# Patient Record
Sex: MALE | Race: WHITE | NOT HISPANIC OR LATINO | Employment: OTHER | URBAN - METROPOLITAN AREA
[De-identification: names, ages, dates, MRNs, and addresses within clinical notes are randomized per-mention and may not be internally consistent; named-entity substitution may affect disease eponyms.]

---

## 2018-11-12 ENCOUNTER — OFFICE VISIT (OUTPATIENT)
Dept: CARDIOLOGY | Facility: CLINIC | Age: 73
End: 2018-11-12

## 2018-11-12 VITALS
HEIGHT: 77 IN | DIASTOLIC BLOOD PRESSURE: 86 MMHG | WEIGHT: 237 LBS | BODY MASS INDEX: 27.98 KG/M2 | SYSTOLIC BLOOD PRESSURE: 146 MMHG | HEART RATE: 63 BPM

## 2018-11-12 DIAGNOSIS — I42.9 CARDIOMYOPATHY, UNSPECIFIED TYPE (HCC): ICD-10-CM

## 2018-11-12 DIAGNOSIS — I65.23 BILATERAL CAROTID ARTERY STENOSIS: ICD-10-CM

## 2018-11-12 DIAGNOSIS — I48.0 PAROXYSMAL ATRIAL FIBRILLATION (HCC): Primary | ICD-10-CM

## 2018-11-12 PROCEDURE — 99205 OFFICE O/P NEW HI 60 MIN: CPT | Performed by: INTERNAL MEDICINE

## 2018-11-12 PROCEDURE — 93000 ELECTROCARDIOGRAM COMPLETE: CPT | Performed by: INTERNAL MEDICINE

## 2018-11-12 RX ORDER — ROSUVASTATIN CALCIUM 20 MG/1
TABLET, COATED ORAL
Refills: 3 | COMMUNITY
Start: 2018-09-24 | End: 2018-12-12

## 2018-11-12 RX ORDER — IPRATROPIUM BROMIDE 21 UG/1
SPRAY, METERED NASAL
Refills: 5 | COMMUNITY
Start: 2018-10-29 | End: 2018-12-12

## 2018-11-12 RX ORDER — TADALAFIL 5 MG
5 TABLET ORAL
COMMUNITY
Start: 2015-05-26 | End: 2018-12-12

## 2018-11-12 NOTE — PROGRESS NOTES
Subjective:     Encounter Date:11/12/2018      Patient ID: Lucas Maynard is a 73 y.o. male.    Chief Complaint: cardiomyopathy, carotid artery stenosis, PAF    History of Present Illness    Dear Dr. Aparicio:    I had the pleasure of seeing the patient in cardiac evaluation today.  As you well know, he is a johnson, 73-year-old man with history of carotid artery stenosis.  He follows with Dr. Acevedo for this and is considering carotid artery intervention, either with endarterectomy or with TCAR.  His most recent CT scan suggests 95% stenosis on the right and 70% to 80% on the left.      He has been asymptomatic without any neurologic complaints except for some dizziness.    He exercises three times weekly at the gym.  He can do 15-20 minutes of moderate cardiovascular workout without any angina.    Last year, he saw Dr. Castro for atrial fibrillation.  He was found to be in at least persistent atrial fibrillation.  He was started on rivaroxaban but quit this after a short time.  He then switched to apixaban, which he reports causes oral ulcers.  He is looking for an alternative treatment.      He states that he has been taking his apixaban only when he feels like it because he is worried about side effects.    He had an echocardiogram that suggested an ejection fraction of 45% with mild hypokinesis.  A nuclear perfusion stress test showed good perfusion except for a small area of possible prior infarct.    He comes in to discuss his cardiovascular risks of surgery as well as his general cardiac care.        Review of Systems   Cardiovascular: Negative for orthopnea.   Respiratory: Negative for shortness of breath.    All other systems reviewed and are negative.    History reviewed. No pertinent family history.    Social History     Tobacco Use   • Smoking status: Never Smoker   • Smokeless tobacco: Never Used   Substance Use Topics   • Alcohol use: Yes     Comment: SOCIALLY   • Drug use: No           ECG 12  Lead  Date/Time: 11/12/2018 9:54 AM  Performed by: Javier Powers MD  Authorized by: Javier Powers MD   Comparison: compared with previous ECG   Similar to previous ECG  Rhythm: atrial fibrillation  Ectopy: PVCs  BPM: 63                 Objective:     Physical Exam   Constitutional: He is oriented to person, place, and time. He appears well-developed and well-nourished.   HENT:   Head: Normocephalic and atraumatic.   Neck: Normal range of motion. Neck supple. No JVD present.   Cardiovascular: Normal rate and normal heart sounds. An irregularly irregular rhythm present. Exam reveals no S3 and no S4.   Pulmonary/Chest: Effort normal and breath sounds normal. He has no rales.   Abdominal: Soft. Bowel sounds are normal. He exhibits no ascites. There is no hepatomegaly.   Musculoskeletal: Normal range of motion.   Neurological: He is alert and oriented to person, place, and time.   Skin: Skin is warm and dry.   Psychiatric: He has a normal mood and affect. His behavior is normal. Thought content normal.   Vitals reviewed.      Lab Review:       Assessment:          Diagnosis Plan   1. Paroxysmal atrial fibrillation (CMS/HCC)     2. Bilateral carotid artery stenosis     3. Cardiomyopathy, unspecified type (CMS/HCC)            Plan:       It was a pleasure to see your patient in cardiac evaluation today.  He is a johnson, 73-year-old man with mild cardiomyopathy, which is likely due to atrial fibrillation rather than ischemic heart disease.  He has no symptoms of angina or heart failure.  He has no palpitations.  I have urged him to remain on anticoagulation in general to reduce his overall stroke risk.  He says that, after he gets through surgery, he might consider going back to rivaroxaban or maybe decreasing the dose of his apixaban.  He does not want to go on warfarin.      As far as his cardiovascular risk of surgery is concerned, I think he is at low risk of ischemic or heart failure complications of surgery.      He is  contemplated the TCAR procedure and I said that it is possible that, with bilateral carotid disease, he might qualify for this procedure on that basis.  He will discuss this further with Dr. Acevedo.    He will stop his anticoagulation three to five days prior to any surgical intervention.  He will see me again as needed.    I spent one hour in direct patient contact discussing these issues with the patient.    Atrial Fibrillation and Atrial Flutter  Assessment  • The patient has persistent atrial fibrillation  • This is non-valvular in etiology  • The patient's CHADS2-VASc score is 3  • A [id number] score of 2 or more is considered a high risk for a thromboembolic event  • Apixaban prescribed  • Aspirin prescribed    Plan  • Continue in atrial fibrillation with rate control  • Continue aspirin and apixaban for antithrombotic therapy, bleeding issues discussed  • Continue beta blocker for rate control    Heart Failure  Assessment  • NYHA class I - There is no limitation of physical activity. Physical activity does not cause fatigue, palpitations or shortness of breath.  • The most recent ejection fraction is 45%  • Left ventricular function is mildly reduced by qualitative assessment    Subjective/Objective    • Physical exam findings negative for rales, peripheral edema, elevated JVP, S3 gallop, S4 gallop, hepatomegaly and ascites.

## 2018-12-12 ENCOUNTER — HOSPITAL ENCOUNTER (OUTPATIENT)
Dept: GENERAL RADIOLOGY | Facility: HOSPITAL | Age: 73
Discharge: HOME | End: 2018-12-12
Admitting: SURGERY

## 2018-12-12 ENCOUNTER — APPOINTMENT (OUTPATIENT)
Dept: PREADMISSION TESTING | Facility: HOSPITAL | Age: 73
End: 2018-12-12

## 2018-12-12 VITALS
DIASTOLIC BLOOD PRESSURE: 86 MMHG | RESPIRATION RATE: 16 BRPM | BODY MASS INDEX: 31.62 KG/M2 | TEMPERATURE: 96.5 F | OXYGEN SATURATION: 99 % | HEIGHT: 73 IN | SYSTOLIC BLOOD PRESSURE: 155 MMHG | WEIGHT: 238.6 LBS | HEART RATE: 70 BPM

## 2018-12-12 LAB
ALBUMIN SERPL-MCNC: 4 G/DL (ref 3.5–5.2)
ALBUMIN/GLOB SERPL: 1.4 G/DL
ALP SERPL-CCNC: 67 U/L (ref 39–117)
ALT SERPL W P-5'-P-CCNC: 20 U/L (ref 1–41)
ANION GAP SERPL CALCULATED.3IONS-SCNC: 12.5 MMOL/L
APTT PPP: 25.7 SECONDS (ref 22.7–35.4)
AST SERPL-CCNC: 18 U/L (ref 1–40)
BACTERIA UR QL AUTO: NORMAL /HPF
BILIRUB SERPL-MCNC: 0.9 MG/DL (ref 0.1–1.2)
BILIRUB UR QL STRIP: NEGATIVE
BUN BLD-MCNC: 23 MG/DL (ref 8–23)
BUN/CREAT SERPL: 18 (ref 7–25)
CALCIUM SPEC-SCNC: 9.1 MG/DL (ref 8.6–10.5)
CHLORIDE SERPL-SCNC: 101 MMOL/L (ref 98–107)
CLARITY UR: CLEAR
CO2 SERPL-SCNC: 27.5 MMOL/L (ref 22–29)
COLOR UR: ABNORMAL
CREAT BLD-MCNC: 1.28 MG/DL (ref 0.76–1.27)
DEPRECATED RDW RBC AUTO: 43.9 FL (ref 37–54)
ERYTHROCYTE [DISTWIDTH] IN BLOOD BY AUTOMATED COUNT: 13.5 % (ref 11.5–14.5)
GFR SERPL CREATININE-BSD FRML MDRD: 55 ML/MIN/1.73
GLOBULIN UR ELPH-MCNC: 2.8 GM/DL
GLUCOSE BLD-MCNC: 114 MG/DL (ref 65–99)
GLUCOSE UR STRIP-MCNC: NEGATIVE MG/DL
HCT VFR BLD AUTO: 41.9 % (ref 40.4–52.2)
HGB BLD-MCNC: 14 G/DL (ref 13.7–17.6)
HGB UR QL STRIP.AUTO: NEGATIVE
HYALINE CASTS UR QL AUTO: NORMAL /LPF
INR PPP: 1 (ref 0.9–1.1)
KETONES UR QL STRIP: ABNORMAL
LEUKOCYTE ESTERASE UR QL STRIP.AUTO: NEGATIVE
MCH RBC QN AUTO: 30 PG (ref 27–32.7)
MCHC RBC AUTO-ENTMCNC: 33.4 G/DL (ref 32.6–36.4)
MCV RBC AUTO: 89.9 FL (ref 79.8–96.2)
NITRITE UR QL STRIP: NEGATIVE
PH UR STRIP.AUTO: <=5 [PH] (ref 5–8)
PLATELET # BLD AUTO: 215 10*3/MM3 (ref 140–500)
PMV BLD AUTO: 10.7 FL (ref 6–12)
POTASSIUM BLD-SCNC: 4.2 MMOL/L (ref 3.5–5.2)
PROT SERPL-MCNC: 6.8 G/DL (ref 6–8.5)
PROT UR QL STRIP: ABNORMAL
PROTHROMBIN TIME: 13 SECONDS (ref 11.7–14.2)
RBC # BLD AUTO: 4.66 10*6/MM3 (ref 4.6–6)
RBC # UR: NORMAL /HPF
REF LAB TEST METHOD: NORMAL
SODIUM BLD-SCNC: 141 MMOL/L (ref 136–145)
SP GR UR STRIP: 1.03 (ref 1–1.03)
SQUAMOUS #/AREA URNS HPF: NORMAL /HPF
UROBILINOGEN UR QL STRIP: ABNORMAL
WBC NRBC COR # BLD: 4.69 10*3/MM3 (ref 4.5–10.7)
WBC UR QL AUTO: NORMAL /HPF

## 2018-12-12 PROCEDURE — 85610 PROTHROMBIN TIME: CPT | Performed by: SURGERY

## 2018-12-12 PROCEDURE — 85730 THROMBOPLASTIN TIME PARTIAL: CPT | Performed by: SURGERY

## 2018-12-12 PROCEDURE — 80053 COMPREHEN METABOLIC PANEL: CPT | Performed by: SURGERY

## 2018-12-12 PROCEDURE — 36415 COLL VENOUS BLD VENIPUNCTURE: CPT

## 2018-12-12 PROCEDURE — 71046 X-RAY EXAM CHEST 2 VIEWS: CPT

## 2018-12-12 PROCEDURE — 81001 URINALYSIS AUTO W/SCOPE: CPT | Performed by: SURGERY

## 2018-12-12 PROCEDURE — 85027 COMPLETE CBC AUTOMATED: CPT | Performed by: SURGERY

## 2018-12-12 RX ORDER — ASPIRIN 81 MG/1
81 TABLET, CHEWABLE ORAL DAILY
COMMUNITY
End: 2019-06-26

## 2018-12-12 RX ORDER — IPRATROPIUM BROMIDE 21 UG/1
2 SPRAY, METERED NASAL EVERY 12 HOURS
COMMUNITY

## 2018-12-12 RX ORDER — SILDENAFIL CITRATE 20 MG/1
20 TABLET ORAL AS NEEDED
COMMUNITY
End: 2021-09-22 | Stop reason: HOSPADM

## 2018-12-12 RX ORDER — ROSUVASTATIN CALCIUM 20 MG/1
10 TABLET, COATED ORAL 3 TIMES WEEKLY
COMMUNITY
End: 2021-09-10

## 2018-12-12 RX ORDER — LEVOTHYROXINE SODIUM 0.05 MG/1
75 TABLET ORAL DAILY
COMMUNITY

## 2018-12-12 RX ORDER — METOPROLOL SUCCINATE 25 MG/1
25 TABLET, EXTENDED RELEASE ORAL EVERY MORNING
COMMUNITY
End: 2021-09-22 | Stop reason: HOSPADM

## 2018-12-12 NOTE — DISCHARGE INSTRUCTIONS
Take the following medications the morning of surgery with a small sip of water:    NASAL SPRAY, LEVOTHYROXINE, METOPROLOL, PROTONIX      ARRIVE TO MAIN OR 8:30 AM ON 12/20/18      General Instructions:  • Do not eat solid food after midnight the night before surgery.  • You may drink clear liquids day of surgery but must stop at least one hour before your hospital arrival time.  • It is beneficial for you to have a clear drink that contains carbohydrates the day of surgery.  We suggest a 12 to 20 ounce bottle of Gatorade or Powerade for non-diabetic patients or a 12 to 20 ounce bottle of G2 or Powerade Zero for diabetic patients. (Pediatric patients, are not advised to drink a 12 to 20 ounce carbohydrate drink)    Clear liquids are liquids you can see through.  Nothing red in color.     Plain water                               Sports drinks  Sodas                                   Gelatin (Jell-O)  Fruit juices without pulp such as white grape juice and apple juice  Popsicles that contain no fruit or yogurt  Tea or coffee (no cream or milk added)  Gatorade / Powerade  G2 / Powerade Zero    • Infants may have breast milk up to four hours before surgery.  • Infants drinking formula may drink formula up to six hours before surgery.   • Patients who avoid smoking, chewing tobacco and alcohol for 4 weeks prior to surgery have a reduced risk of post-operative complications.  Quit smoking as many days before surgery as you can.  • Do not smoke, use chewing tobacco or drink alcohol the day of surgery.   • If applicable bring your C-PAP/ BI-PAP machine.  • Bring any papers given to you in the doctor’s office.  • Wear clean comfortable clothes and socks.  • Do not wear contact lenses or make-up.  Bring a case for your glasses.   • Bring crutches or walker if applicable.  • Remove all piercings.  Leave jewelry and any other valuables at home.  • Hair extensions with metal clips must be removed prior to surgery.  • The  Pre-Admission Testing nurse will instruct you to bring medications if unable to obtain an accurate list in Pre-Admission Testing.            Preventing a Surgical Site Infection:  • For 2 to 3 days before surgery, avoid shaving with a razor because the razor can irritate skin and make it easier to develop an infection.    • Any areas of open skin can increase the risk of a post-operative wound infection by allowing bacteria to enter and travel throughout the body.  Notify your surgeon if you have any skin wounds / rashes even if it is not near the expected surgical site.  The area will need assessed to determine if surgery should be delayed until it is healed.  • The night prior to surgery sleep in a clean bed with clean clothing.  Do not allow pets to sleep with you.  • Shower on the morning of surgery using a fresh bar of anti-bacterial soap (such as Dial) and clean washcloth.  Dry with a clean towel and dress in clean clothing.  • Ask your surgeon if you will be receiving antibiotics prior to surgery.  • Make sure you, your family, and all healthcare providers clean their hands with soap and water or an alcohol based hand  before caring for you or your wound.    Day of surgery:  Upon arrival, a Pre-op nurse and Anesthesiologist will review your health history, obtain vital signs, and answer questions you may have.  The only belongings needed at this time will be your home medications and if applicable your C-PAP/BI-PAP machine.  If you are staying overnight your family can leave the rest of your belongings in the car and bring them to your room later.  A Pre-op nurse will start an IV and you may receive medication in preparation for surgery, including something to help you relax.  Your family will be able to see you in the Pre-op area.  While you are in surgery your family should notify the waiting room  if they leave the waiting room area and provide a contact phone number.    Please be  aware that surgery does come with discomfort.  We want to make every effort to control your discomfort so please discuss any uncontrolled symptoms with your nurse.   Your doctor will most likely have prescribed pain medications.      If you are going home after surgery you will receive individualized written care instructions before being discharged.  A responsible adult must drive you to and from the hospital on the day of your surgery and stay with you for 24 hours.    If you are staying overnight following surgery, you will be transported to your hospital room following the recovery period.  [facility] has all private rooms.    You have received a list of surgical assistants for your reference.  If you have any questions please call Pre-Admission Testing at [phone].  Deductibles and co-payments are collected on the day of service. Please be prepared to pay the required co-pay, deductible or deposit on the day of service as defined by your plan.

## 2018-12-20 ENCOUNTER — ANESTHESIA EVENT (OUTPATIENT)
Dept: PERIOP | Facility: HOSPITAL | Age: 73
End: 2018-12-20

## 2018-12-20 ENCOUNTER — HOSPITAL ENCOUNTER (INPATIENT)
Facility: HOSPITAL | Age: 73
LOS: 1 days | Discharge: HOME | End: 2018-12-21
Attending: SURGERY | Admitting: SURGERY

## 2018-12-20 ENCOUNTER — ANESTHESIA (OUTPATIENT)
Dept: PERIOP | Facility: HOSPITAL | Age: 73
End: 2018-12-20

## 2018-12-20 ENCOUNTER — APPOINTMENT (OUTPATIENT)
Dept: CARDIOLOGY | Facility: HOSPITAL | Age: 73
End: 2018-12-20
Attending: SURGERY

## 2018-12-20 PROBLEM — E03.9 ACQUIRED HYPOTHYROIDISM: Status: ACTIVE | Noted: 2018-06-22

## 2018-12-20 PROBLEM — I65.23 ASYMPTOMATIC BILATERAL CAROTID ARTERY STENOSIS: Status: ACTIVE | Noted: 2018-12-20

## 2018-12-20 LAB
BH CV XLRA MEAS RIGHT DIST CCA EDV: 18 CM/SEC
BH CV XLRA MEAS RIGHT DIST CCA PSV: 86 CM/SEC
BH CV XLRA MEAS RIGHT PROX ECA EDV: 6 CM/SEC
BH CV XLRA MEAS RIGHT PROX ECA PSV: 63 CM/SEC
BH CV XLRA MEAS RIGHT PROX ICA EDV: 25 CM/SEC
BH CV XLRA MEAS RIGHT PROX ICA PSV: 54 CM/SEC

## 2018-12-20 PROCEDURE — 25010000002 FENTANYL CITRATE (PF) 100 MCG/2ML SOLUTION: Performed by: ANESTHESIOLOGY

## 2018-12-20 PROCEDURE — 25010000003 CEFAZOLIN IN DEXTROSE 2-4 GM/100ML-% SOLUTION: Performed by: SURGERY

## 2018-12-20 PROCEDURE — 25010000002 PROTAMINE SULFATE PER 10 MG: Performed by: NURSE ANESTHETIST, CERTIFIED REGISTERED

## 2018-12-20 PROCEDURE — 93882 EXTRACRANIAL UNI/LTD STUDY: CPT

## 2018-12-20 PROCEDURE — 03UK0KZ SUPPLEMENT RIGHT INTERNAL CAROTID ARTERY WITH NONAUTOLOGOUS TISSUE SUBSTITUTE, OPEN APPROACH: ICD-10-PCS | Performed by: SURGERY

## 2018-12-20 PROCEDURE — 03CM0ZZ EXTIRPATION OF MATTER FROM RIGHT EXTERNAL CAROTID ARTERY, OPEN APPROACH: ICD-10-PCS | Performed by: SURGERY

## 2018-12-20 PROCEDURE — 03UM0KZ SUPPLEMENT RIGHT EXTERNAL CAROTID ARTERY WITH NONAUTOLOGOUS TISSUE SUBSTITUTE, OPEN APPROACH: ICD-10-PCS | Performed by: SURGERY

## 2018-12-20 PROCEDURE — 03UH0KZ SUPPLEMENT RIGHT COMMON CAROTID ARTERY WITH NONAUTOLOGOUS TISSUE SUBSTITUTE, OPEN APPROACH: ICD-10-PCS | Performed by: SURGERY

## 2018-12-20 PROCEDURE — 25010000002 PHENYLEPHRINE PER 1 ML: Performed by: NURSE ANESTHETIST, CERTIFIED REGISTERED

## 2018-12-20 PROCEDURE — 25010000002 ONDANSETRON PER 1 MG: Performed by: NURSE ANESTHETIST, CERTIFIED REGISTERED

## 2018-12-20 PROCEDURE — 25010000002 DEXAMETHASONE PER 1 MG: Performed by: NURSE ANESTHETIST, CERTIFIED REGISTERED

## 2018-12-20 PROCEDURE — 03CH0ZZ EXTIRPATION OF MATTER FROM RIGHT COMMON CAROTID ARTERY, OPEN APPROACH: ICD-10-PCS | Performed by: SURGERY

## 2018-12-20 PROCEDURE — 25010000003 CEFAZOLIN PER 500 MG: Performed by: SURGERY

## 2018-12-20 PROCEDURE — 25010000002 MIDAZOLAM PER 1 MG: Performed by: ANESTHESIOLOGY

## 2018-12-20 PROCEDURE — 03CK0ZZ EXTIRPATION OF MATTER FROM RIGHT INTERNAL CAROTID ARTERY, OPEN APPROACH: ICD-10-PCS | Performed by: SURGERY

## 2018-12-20 PROCEDURE — 25010000002 PROPOFOL 10 MG/ML EMULSION: Performed by: NURSE ANESTHETIST, CERTIFIED REGISTERED

## 2018-12-20 PROCEDURE — 25010000002 FENTANYL CITRATE (PF) 100 MCG/2ML SOLUTION: Performed by: NURSE ANESTHETIST, CERTIFIED REGISTERED

## 2018-12-20 PROCEDURE — 25010000002 HEPARIN (PORCINE) PER 1000 UNITS: Performed by: NURSE ANESTHETIST, CERTIFIED REGISTERED

## 2018-12-20 PROCEDURE — 85347 COAGULATION TIME ACTIVATED: CPT

## 2018-12-20 PROCEDURE — C1768 GRAFT, VASCULAR: HCPCS | Performed by: SURGERY

## 2018-12-20 PROCEDURE — 25010000002 HEPARIN (PORCINE) PER 1000 UNITS: Performed by: SURGERY

## 2018-12-20 DEVICE — VASCU-GUARD PERIPHERAL VASCULAR PATCH IS PREPARED FROM BOVINE PERICARDIUM WHICH IS CROSS-LINKED WITH GLUTARALDEHYDE. THE PERICARDIUM IS PROCURED FROM CATTLE ORIGINATING IN THE UNITED STATES. VASCU-GUARD PERIPHERAL VASCULAR PATCH IS CHEMICALLY STERILIZED USING ETHANOL AND PROPYLENE OXIDE. VASCU-GUARD PERIPHERAL VASCULAR PATCH HAS BEEN TREATED WITH 1 MOLAR SODIUM HYDROXIDE FOR A MINIMUM OF 60 MINUTES AT 20 - 25°C.  VASCU-GUARD PERIPHERAL VASCULAR PATCH IS PACKAGED IN A CONTAINER FILLED WITH STERILE, NON-PYROGENIC WATER CONTAINING PROPYLENE OXIDE. THE CONTENTS OF THE UNOPENED, UNDAMAGED CONTAINER ARE STERILE.
Type: IMPLANTABLE DEVICE | Site: CAROTID | Status: FUNCTIONAL
Brand: VASCU-GUARD PERIPHERAL VASCULAR PATCH

## 2018-12-20 RX ORDER — PROPOFOL 10 MG/ML
VIAL (ML) INTRAVENOUS AS NEEDED
Status: DISCONTINUED | OUTPATIENT
Start: 2018-12-20 | End: 2018-12-20 | Stop reason: SURG

## 2018-12-20 RX ORDER — LIDOCAINE HYDROCHLORIDE 10 MG/ML
INJECTION, SOLUTION EPIDURAL; INFILTRATION; INTRACAUDAL; PERINEURAL AS NEEDED
Status: DISCONTINUED | OUTPATIENT
Start: 2018-12-20 | End: 2018-12-20 | Stop reason: HOSPADM

## 2018-12-20 RX ORDER — FAMOTIDINE 10 MG/ML
20 INJECTION, SOLUTION INTRAVENOUS ONCE
Status: DISCONTINUED | OUTPATIENT
Start: 2018-12-20 | End: 2018-12-20 | Stop reason: HOSPADM

## 2018-12-20 RX ORDER — MIDAZOLAM HYDROCHLORIDE 1 MG/ML
INJECTION INTRAMUSCULAR; INTRAVENOUS
Status: COMPLETED | OUTPATIENT
Start: 2018-12-20 | End: 2018-12-20

## 2018-12-20 RX ORDER — SODIUM CHLORIDE 0.9 % (FLUSH) 0.9 %
1-10 SYRINGE (ML) INJECTION AS NEEDED
Status: DISCONTINUED | OUTPATIENT
Start: 2018-12-20 | End: 2018-12-20 | Stop reason: HOSPADM

## 2018-12-20 RX ORDER — HYDROMORPHONE HYDROCHLORIDE 1 MG/ML
0.5 INJECTION, SOLUTION INTRAMUSCULAR; INTRAVENOUS; SUBCUTANEOUS
Status: DISCONTINUED | OUTPATIENT
Start: 2018-12-20 | End: 2018-12-21 | Stop reason: HOSPADM

## 2018-12-20 RX ORDER — LIDOCAINE HYDROCHLORIDE 20 MG/ML
INJECTION, SOLUTION INFILTRATION; PERINEURAL AS NEEDED
Status: DISCONTINUED | OUTPATIENT
Start: 2018-12-20 | End: 2018-12-20 | Stop reason: SURG

## 2018-12-20 RX ORDER — ONDANSETRON 2 MG/ML
4 INJECTION INTRAMUSCULAR; INTRAVENOUS ONCE AS NEEDED
Status: COMPLETED | OUTPATIENT
Start: 2018-12-20 | End: 2018-12-20

## 2018-12-20 RX ORDER — HYDROMORPHONE HYDROCHLORIDE 1 MG/ML
0.25 INJECTION, SOLUTION INTRAMUSCULAR; INTRAVENOUS; SUBCUTANEOUS
Status: DISCONTINUED | OUTPATIENT
Start: 2018-12-20 | End: 2018-12-20 | Stop reason: HOSPADM

## 2018-12-20 RX ORDER — HYDROCODONE BITARTRATE AND ACETAMINOPHEN 7.5; 325 MG/1; MG/1
1 TABLET ORAL ONCE AS NEEDED
Status: DISCONTINUED | OUTPATIENT
Start: 2018-12-20 | End: 2018-12-20 | Stop reason: HOSPADM

## 2018-12-20 RX ORDER — SODIUM CHLORIDE, SODIUM LACTATE, POTASSIUM CHLORIDE, CALCIUM CHLORIDE 600; 310; 30; 20 MG/100ML; MG/100ML; MG/100ML; MG/100ML
50 INJECTION, SOLUTION INTRAVENOUS CONTINUOUS
Status: DISCONTINUED | OUTPATIENT
Start: 2018-12-20 | End: 2018-12-21

## 2018-12-20 RX ORDER — FLUMAZENIL 0.1 MG/ML
0.2 INJECTION INTRAVENOUS AS NEEDED
Status: DISCONTINUED | OUTPATIENT
Start: 2018-12-20 | End: 2018-12-20 | Stop reason: HOSPADM

## 2018-12-20 RX ORDER — PROMETHAZINE HYDROCHLORIDE 25 MG/1
25 SUPPOSITORY RECTAL ONCE AS NEEDED
Status: DISCONTINUED | OUTPATIENT
Start: 2018-12-20 | End: 2018-12-20 | Stop reason: HOSPADM

## 2018-12-20 RX ORDER — FENTANYL CITRATE 50 UG/ML
INJECTION, SOLUTION INTRAMUSCULAR; INTRAVENOUS
Status: COMPLETED | OUTPATIENT
Start: 2018-12-20 | End: 2018-12-20

## 2018-12-20 RX ORDER — PROMETHAZINE HYDROCHLORIDE 25 MG/ML
12.5 INJECTION, SOLUTION INTRAMUSCULAR; INTRAVENOUS ONCE AS NEEDED
Status: DISCONTINUED | OUTPATIENT
Start: 2018-12-20 | End: 2018-12-20 | Stop reason: HOSPADM

## 2018-12-20 RX ORDER — PROTAMINE SULFATE 10 MG/ML
INJECTION, SOLUTION INTRAVENOUS AS NEEDED
Status: DISCONTINUED | OUTPATIENT
Start: 2018-12-20 | End: 2018-12-20 | Stop reason: SURG

## 2018-12-20 RX ORDER — MEPERIDINE HYDROCHLORIDE 25 MG/ML
12.5 INJECTION INTRAMUSCULAR; INTRAVENOUS; SUBCUTANEOUS
Status: DISCONTINUED | OUTPATIENT
Start: 2018-12-20 | End: 2018-12-20 | Stop reason: HOSPADM

## 2018-12-20 RX ORDER — MIDAZOLAM HYDROCHLORIDE 1 MG/ML
1 INJECTION INTRAMUSCULAR; INTRAVENOUS
Status: DISCONTINUED | OUTPATIENT
Start: 2018-12-20 | End: 2018-12-20 | Stop reason: HOSPADM

## 2018-12-20 RX ORDER — FENTANYL CITRATE 50 UG/ML
50 INJECTION, SOLUTION INTRAMUSCULAR; INTRAVENOUS
Status: DISCONTINUED | OUTPATIENT
Start: 2018-12-20 | End: 2018-12-20 | Stop reason: HOSPADM

## 2018-12-20 RX ORDER — LIDOCAINE HYDROCHLORIDE 10 MG/ML
0.5 INJECTION, SOLUTION EPIDURAL; INFILTRATION; INTRACAUDAL; PERINEURAL ONCE AS NEEDED
Status: DISCONTINUED | OUTPATIENT
Start: 2018-12-20 | End: 2018-12-20 | Stop reason: HOSPADM

## 2018-12-20 RX ORDER — ONDANSETRON 2 MG/ML
INJECTION INTRAMUSCULAR; INTRAVENOUS AS NEEDED
Status: DISCONTINUED | OUTPATIENT
Start: 2018-12-20 | End: 2018-12-20 | Stop reason: SURG

## 2018-12-20 RX ORDER — HYDROCODONE BITARTRATE AND ACETAMINOPHEN 5; 325 MG/1; MG/1
1 TABLET ORAL EVERY 4 HOURS PRN
Status: DISCONTINUED | OUTPATIENT
Start: 2018-12-20 | End: 2018-12-21 | Stop reason: HOSPADM

## 2018-12-20 RX ORDER — LEVOTHYROXINE SODIUM 0.05 MG/1
50 TABLET ORAL
Status: DISCONTINUED | OUTPATIENT
Start: 2018-12-21 | End: 2018-12-21 | Stop reason: HOSPADM

## 2018-12-20 RX ORDER — SODIUM CHLORIDE, SODIUM LACTATE, POTASSIUM CHLORIDE, CALCIUM CHLORIDE 600; 310; 30; 20 MG/100ML; MG/100ML; MG/100ML; MG/100ML
9 INJECTION, SOLUTION INTRAVENOUS CONTINUOUS
Status: DISCONTINUED | OUTPATIENT
Start: 2018-12-20 | End: 2018-12-20

## 2018-12-20 RX ORDER — OXYCODONE AND ACETAMINOPHEN 7.5; 325 MG/1; MG/1
1 TABLET ORAL ONCE AS NEEDED
Status: DISCONTINUED | OUTPATIENT
Start: 2018-12-20 | End: 2018-12-20 | Stop reason: HOSPADM

## 2018-12-20 RX ORDER — CEFAZOLIN SODIUM 2 G/100ML
2 INJECTION, SOLUTION INTRAVENOUS EVERY 8 HOURS
Status: COMPLETED | OUTPATIENT
Start: 2018-12-20 | End: 2018-12-21

## 2018-12-20 RX ORDER — ROCURONIUM BROMIDE 10 MG/ML
INJECTION, SOLUTION INTRAVENOUS AS NEEDED
Status: DISCONTINUED | OUTPATIENT
Start: 2018-12-20 | End: 2018-12-20 | Stop reason: SURG

## 2018-12-20 RX ORDER — DEXAMETHASONE SODIUM PHOSPHATE 10 MG/ML
INJECTION INTRAMUSCULAR; INTRAVENOUS AS NEEDED
Status: DISCONTINUED | OUTPATIENT
Start: 2018-12-20 | End: 2018-12-20 | Stop reason: SURG

## 2018-12-20 RX ORDER — CEFAZOLIN SODIUM 2 G/100ML
2 INJECTION, SOLUTION INTRAVENOUS ONCE
Status: COMPLETED | OUTPATIENT
Start: 2018-12-20 | End: 2018-12-20

## 2018-12-20 RX ORDER — PROMETHAZINE HYDROCHLORIDE 25 MG/1
25 TABLET ORAL ONCE AS NEEDED
Status: DISCONTINUED | OUTPATIENT
Start: 2018-12-20 | End: 2018-12-20 | Stop reason: HOSPADM

## 2018-12-20 RX ORDER — DIPHENHYDRAMINE HCL 25 MG
25 CAPSULE ORAL
Status: DISCONTINUED | OUTPATIENT
Start: 2018-12-20 | End: 2018-12-20 | Stop reason: HOSPADM

## 2018-12-20 RX ORDER — NALOXONE HCL 0.4 MG/ML
0.2 VIAL (ML) INJECTION AS NEEDED
Status: DISCONTINUED | OUTPATIENT
Start: 2018-12-20 | End: 2018-12-20 | Stop reason: HOSPADM

## 2018-12-20 RX ORDER — ACETAMINOPHEN 325 MG/1
650 TABLET ORAL ONCE AS NEEDED
Status: DISCONTINUED | OUTPATIENT
Start: 2018-12-20 | End: 2018-12-20 | Stop reason: HOSPADM

## 2018-12-20 RX ORDER — NALOXONE HCL 0.4 MG/ML
0.4 VIAL (ML) INJECTION
Status: DISCONTINUED | OUTPATIENT
Start: 2018-12-20 | End: 2018-12-21 | Stop reason: HOSPADM

## 2018-12-20 RX ORDER — EPHEDRINE SULFATE 50 MG/ML
5 INJECTION, SOLUTION INTRAVENOUS ONCE AS NEEDED
Status: DISCONTINUED | OUTPATIENT
Start: 2018-12-20 | End: 2018-12-20 | Stop reason: HOSPADM

## 2018-12-20 RX ORDER — HEPARIN SODIUM 1000 [USP'U]/ML
INJECTION, SOLUTION INTRAVENOUS; SUBCUTANEOUS AS NEEDED
Status: DISCONTINUED | OUTPATIENT
Start: 2018-12-20 | End: 2018-12-20 | Stop reason: SURG

## 2018-12-20 RX ORDER — METOPROLOL SUCCINATE 25 MG/1
25 TABLET, EXTENDED RELEASE ORAL DAILY
Status: DISCONTINUED | OUTPATIENT
Start: 2018-12-21 | End: 2018-12-21 | Stop reason: HOSPADM

## 2018-12-20 RX ORDER — MIDAZOLAM HYDROCHLORIDE 1 MG/ML
2 INJECTION INTRAMUSCULAR; INTRAVENOUS
Status: DISCONTINUED | OUTPATIENT
Start: 2018-12-20 | End: 2018-12-20 | Stop reason: HOSPADM

## 2018-12-20 RX ORDER — ROSUVASTATIN CALCIUM 20 MG/1
20 TABLET, COATED ORAL 3 TIMES WEEKLY
Status: DISCONTINUED | OUTPATIENT
Start: 2018-12-21 | End: 2018-12-21 | Stop reason: HOSPADM

## 2018-12-20 RX ORDER — ASPIRIN 81 MG/1
81 TABLET, CHEWABLE ORAL DAILY
Status: DISCONTINUED | OUTPATIENT
Start: 2018-12-20 | End: 2018-12-21 | Stop reason: HOSPADM

## 2018-12-20 RX ADMIN — CEFAZOLIN SODIUM 2 G: 2 INJECTION, SOLUTION INTRAVENOUS at 18:17

## 2018-12-20 RX ADMIN — FENTANYL CITRATE 50 MCG: 50 INJECTION, SOLUTION INTRAMUSCULAR; INTRAVENOUS at 14:39

## 2018-12-20 RX ADMIN — FENTANYL CITRATE 50 MCG: 50 INJECTION, SOLUTION INTRAMUSCULAR; INTRAVENOUS at 14:27

## 2018-12-20 RX ADMIN — FENTANYL CITRATE 50 MCG: 50 INJECTION INTRAMUSCULAR; INTRAVENOUS at 09:52

## 2018-12-20 RX ADMIN — LIDOCAINE HYDROCHLORIDE 50 MG: 20 INJECTION, SOLUTION INFILTRATION; PERINEURAL at 11:37

## 2018-12-20 RX ADMIN — PHENYLEPHRINE HYDROCHLORIDE 100 MCG: 10 INJECTION INTRAVENOUS at 12:49

## 2018-12-20 RX ADMIN — HEPARIN SODIUM 7500 UNITS: 1000 INJECTION, SOLUTION INTRAVENOUS; SUBCUTANEOUS at 12:20

## 2018-12-20 RX ADMIN — PROPOFOL 150 MG: 10 INJECTION, EMULSION INTRAVENOUS at 11:37

## 2018-12-20 RX ADMIN — ROCURONIUM BROMIDE 50 MG: 10 INJECTION INTRAVENOUS at 11:37

## 2018-12-20 RX ADMIN — SUGAMMADEX 400 MG: 100 INJECTION, SOLUTION INTRAVENOUS at 13:26

## 2018-12-20 RX ADMIN — SODIUM CHLORIDE, POTASSIUM CHLORIDE, SODIUM LACTATE AND CALCIUM CHLORIDE: 600; 310; 30; 20 INJECTION, SOLUTION INTRAVENOUS at 13:16

## 2018-12-20 RX ADMIN — HEPARIN SODIUM 2500 UNITS: 1000 INJECTION, SOLUTION INTRAVENOUS; SUBCUTANEOUS at 12:32

## 2018-12-20 RX ADMIN — FENTANYL CITRATE 100 MCG: 50 INJECTION INTRAMUSCULAR; INTRAVENOUS at 11:36

## 2018-12-20 RX ADMIN — SODIUM CHLORIDE, POTASSIUM CHLORIDE, SODIUM LACTATE AND CALCIUM CHLORIDE 50 ML/HR: 600; 310; 30; 20 INJECTION, SOLUTION INTRAVENOUS at 15:48

## 2018-12-20 RX ADMIN — PROTAMINE SULFATE 40 MG: 10 INJECTION, SOLUTION INTRAVENOUS at 13:15

## 2018-12-20 RX ADMIN — DEXAMETHASONE SODIUM PHOSPHATE 6 MG: 10 INJECTION INTRAMUSCULAR; INTRAVENOUS at 12:23

## 2018-12-20 RX ADMIN — SODIUM CHLORIDE, POTASSIUM CHLORIDE, SODIUM LACTATE AND CALCIUM CHLORIDE: 600; 310; 30; 20 INJECTION, SOLUTION INTRAVENOUS at 11:26

## 2018-12-20 RX ADMIN — ONDANSETRON 4 MG: 2 INJECTION INTRAMUSCULAR; INTRAVENOUS at 13:15

## 2018-12-20 RX ADMIN — ROCURONIUM BROMIDE 10 MG: 10 INJECTION INTRAVENOUS at 13:00

## 2018-12-20 RX ADMIN — PHENYLEPHRINE HYDROCHLORIDE 100 MCG: 10 INJECTION INTRAVENOUS at 12:29

## 2018-12-20 RX ADMIN — CEFAZOLIN SODIUM 2 G: 2 INJECTION, SOLUTION INTRAVENOUS at 11:42

## 2018-12-20 RX ADMIN — Medication 81 MG: at 18:17

## 2018-12-20 RX ADMIN — PHENYLEPHRINE HYDROCHLORIDE 50 MCG: 10 INJECTION INTRAVENOUS at 12:40

## 2018-12-20 RX ADMIN — HEPARIN SODIUM 5000 UNITS: 1000 INJECTION, SOLUTION INTRAVENOUS; SUBCUTANEOUS at 12:46

## 2018-12-20 RX ADMIN — ONDANSETRON 4 MG: 2 INJECTION INTRAMUSCULAR; INTRAVENOUS at 14:53

## 2018-12-20 RX ADMIN — Medication 2 MG: at 09:51

## 2018-12-20 NOTE — ANESTHESIA PREPROCEDURE EVALUATION
Anesthesia Evaluation     Patient summary reviewed and Nursing notes reviewed   NPO Solid Status: > 8 hours  NPO Liquid Status: > 2 hours           Airway   Mallampati: II  TM distance: >3 FB  Neck ROM: full  no difficulty expected  Dental - normal exam     Pulmonary - negative pulmonary ROS and normal exam    breath sounds clear to auscultation  (-) decreased breath sounds, wheezes  Cardiovascular - normal exam  Exercise tolerance: good (4-7 METS)    Rhythm: regular  Rate: normal    (+) valvular problems/murmurs murmur, hyperlipidemia,  carotid artery disease right carotid  (-) hypertension      Neuro/Psych- negative ROS  (-) seizures, CVA  GI/Hepatic/Renal/Endo    (+)  GERD,  hypothyroidism,   (-) diabetes    Musculoskeletal     Abdominal  - normal exam   Substance History - negative use  (-) alcohol use, drug use     OB/GYN negative ob/gyn ROS         Other   (+) arthritis                     Anesthesia Plan    ASA 3     general     intravenous induction   Anesthetic plan, all risks, benefits, and alternatives have been provided, discussed and informed consent has been obtained with: patient.    Plan discussed with CRNA.

## 2018-12-20 NOTE — ANESTHESIA POSTPROCEDURE EVALUATION
Patient: Lucas Maynard    Procedure Summary     Date:  12/20/18 Room / Location:  [facility] OR 09 / [facility] MAIN OR    Anesthesia Start:  1126 Anesthesia Stop:  1358    Procedure:  RIGHT CAROTID ENDARTERECTOMY (Right Neck) Diagnosis:  Asymptomatic stenosis of right carotid artery    Surgeon:  Javier Acevedo MD Provider:  Jean Pierre Rao MD    Anesthesia Type:  general ASA Status:  3          Anesthesia Type: general  Last vitals  BP   139/92 (12/20/18 1420)   Temp   36.7 °C (98.1 °F) (12/20/18 1355)   Pulse   56 (12/20/18 1420)   Resp   14 (12/20/18 1420)     SpO2   97 % (12/20/18 1420)     Post Anesthesia Care and Evaluation    Patient location during evaluation: bedside  Patient participation: complete - patient participated  Level of consciousness: awake and alert  Pain management: adequate  Airway patency: patent  Anesthetic complications: No anesthetic complications  PONV Status: none  Cardiovascular status: acceptable  Respiratory status: acceptable  Hydration status: acceptable    Comments: /92   Pulse 56   Temp 36.7 °C (98.1 °F) (Oral)   Resp 14   SpO2 97%

## 2018-12-20 NOTE — OP NOTE
Lucas Maynard  Admission date: 12/20/2018  Date of operation: 12/20/2018    Pre-op Diagnosis:      * Asymptomatic stenosis of right carotid artery [I65.21]    Post-Op Diagnosis Codes:     * Asymptomatic stenosis of right carotid artery [I65.21]    Procedure performed: right carotid endarterectomy, vascuguard patch, duplex scan    Surgeon: Dr. Javier Acevedo    Assistants:  Kristie LUIS , provided critical assistance in exposure, retraction, and suctioning that overall decrease blood loss and operative time.    Anesthesia: General    Staff:   Circulator: Emily Murphy, CUAUHTEMOC; Lexi Knapp RN  Scrub Person: Ruth Bowman; Gabriela Oneill  Assistant: Kristie Nielsen    Indications:  Pleasant gentleman with asymptomatic right 95% and left 80% internal carotid artery stenoses.  Land right carotid endarterectomy today.  Planned staged left carotid endarterectomy in future once recovered.  Risks discussed with patient and agrees to proceed.       Procedure Details Right neck prepped and draped in usual fashion.  Linear incision made through skin subcutaneous tissue and platysma.  Bleeders controlled electrocautery and hemoclips.  External jugular vein divided and ligated with silk ligatures.  Greater auricular nerve identified and protected.  Sternocleidomastoid mobilized laterally.  Crossing vessels controlled with hemoclips electrocautery and silk ties.  Carotid sheath identified.  Facial vein divided and ligated with silk suture ligatures.  Additional veins controlled with hemoclips and silk ties.  The right common carotid artery, internal carotid artery, and external carotid artery circumferentially dissected from the omohyoid to digastric muscle.  Vagus nerves and hypoglossal nerves were both identified and protected throughout case.  Patient heparinized with 7,500 units of heparin.  Additional 5,000 units of heparin given during the case to maintain ACT twice normal.  Blood pressure  kept over 140 systolic during clamping.  Internal, common, and external carotid artery clamped sequentially.  Longitudinal arteriotomy made in the common carotid artery and extended into the internal carotid artery above level plaquing.  External Sundt shunt due to contralateral severe carotid stenosis. It was placed and secured proximally and distally with Bar clamps.  Endarterectomy performed of the common and internal carotid artery direct vision.  Eversion endarterectomy performed of the external carotid artery.  Adequate endpoints were achieved.  All loose plaque removed.  Endarterectomy site irrigated with heparin saline.  Vascu-Guard patch angioplasty closure of the endarterectomy site was then performed running 6-0 Prolene suture without problems.  Shunt removed and all air flushed prior to completion.  Intraoperative duplex scan was normal.  Heparin reversed with 40 mg of protamine.  Hemostasis achieved.  Wound irrigated with antibiotic solution.  Counts correct.  Wounds closed in layers with 3-0 Vicryl for the sternocleidomastoid, 4-0 Vicryl for the platysma, and subcuticular Vicryl stitch for the skin.  Dermabond applied.  Patient awoke neurologically intact.  Patient taken to recovery room in stable condition.      Radiographic interpretation: not applicable    Findings:  Severe 95% internal carotid artery stenosis with soft plaque    Estimated Blood Loss: 100ml    Specimens: * No orders in the log *      Drains:      Complications: none    Condition: stable    Disposition: to recovery room    Javier Acevedo MD     Date: 12/20/2018  Time: 1:55 PM    Active Hospital Problems    Diagnosis Date Noted   • Asymptomatic bilateral carotid artery stenosis [I65.23] 12/20/2018      Resolved Hospital Problems   No resolved problems to display.

## 2018-12-20 NOTE — ANESTHESIA PROCEDURE NOTES
ANESTHESIA INTUBATION  Urgency: elective    Date/Time: 12/20/2018 11:39 AM  Airway not difficult    General Information and Staff    Patient location during procedure: OR  Anesthesiologist: Jean Pierre Rao MD  CRNA: Halina Kirk CRNA    Indications and Patient Condition  Indications for airway management: airway protection    Preoxygenated: yes  Mask difficulty assessment: 1 - vent by mask    Final Airway Details  Final airway type: endotracheal airway      Successful airway: ETT  Cuffed: yes   Successful intubation technique: direct laryngoscopy  Endotracheal tube insertion site: oral  Blade: Jemma  Blade size: 4  ETT size (mm): 7.5  Cormack-Lehane Classification: grade I - full view of glottis  Placement verified by: chest auscultation and capnometry   Cuff volume (mL): 6  Measured from: lips  ETT to lips (cm): 21  Number of attempts at approach: 1    Additional Comments  Smooth IV induction. Trachea intubated. Cuff up. Dentition intact without injury.

## 2018-12-20 NOTE — ANESTHESIA PROCEDURE NOTES
Arterial Line      Patient location during procedure: holding area   Line placed for hemodynamic monitoring.  Performed By   Anesthesiologist: Jean Pierre Rao MD  Preanesthetic Checklist  Completed: patient identified, site marked, surgical consent, pre-op evaluation, timeout performed, IV checked, risks and benefits discussed and monitors and equipment checked  Arterial Line Prep   Sterile Tech: gloves  Prep: ChloraPrep  Patient monitoring: blood pressure monitoring, continuous pulse oximetry and EKG  Arterial Line Procedure   Laterality:left  Location:  radial artery  Catheter size: 20 G   Guidance: palpation technique  Number of attempts: 1  Successful placement: yes          Post Assessment   Dressing Type: occlusive dressing applied, secured with tape and wrist guard applied.   Complications no  Circ/Move/Sens Assessment: normal and unchanged.   Patient Tolerance: patient tolerated the procedure well with no apparent complications  Additional Notes  :

## 2018-12-20 NOTE — H&P
Patient Care Team:  Madison Aparicio MD as PCP - General  Madison Aparicio MD as PCP - Family Medicine  Madison Aparicio MD as PCP - Claims Attributed    Chief complaint bilateral severe carotid artery stenosis    Subjective     Patient is a 73 y.o. male presents with right 95% and left 80% internal carotid artery stenosis asymptomatic. This was diagnosed with carotid duplex scan and confirmed with CT angiogram at [facility]. Plan right carotid endarterectomy today and stage left carotid endarterectomy in future.     Patient with atrial fibrillation on eliquis. He has seen Dr Powers preoperatively and suitable candidate for surgery. He has history of prostate cancer treated. He has degenerative back disease stable. Hyperlipidemia and hypertension controlled medically.      Review of Systems   Pertinent items are noted in HPI, all other systems reviewed and negative    Past Medical History:   Diagnosis Date   • CAD (coronary artery disease)    • DDD (degenerative disc disease), lumbar    • Disease of thyroid gland    • GERD (gastroesophageal reflux disease)    • Hyperlipidemia    • Murmur, heart      Past Surgical History:   Procedure Laterality Date   • COLONOSCOPY N/A 7/27/2016    Procedure: COLONOSCOPY to terminal ileum with polypectomy;  Surgeon: Hi Quintanilla MD;  Location: [facility] ENDOSCOPY;  Service:    • CYST REMOVAL Right     ANKLE   • TONSILLECTOMY       Family History   Problem Relation Age of Onset   • Malig Hyperthermia Neg Hx      Social History     Tobacco Use   • Smoking status: Never Smoker   • Smokeless tobacco: Never Used   Substance Use Topics   • Alcohol use: Yes     Comment: SOCIALLY   • Drug use: No     Medications Prior to Admission   Medication Sig Dispense Refill Last Dose   • apixaban (ELIQUIS) 5 MG tablet tablet Take 5 mg by mouth Every 12 (Twelve) Hours.   11/12/2018   • aspirin 81 MG chewable tablet Chew 81 mg Daily.      • ipratropium (ATROVENT) 0.03 % nasal spray 2 sprays into the nostril(s)  as directed by provider Every 12 (Twelve) Hours.      • levothyroxine (SYNTHROID, LEVOTHROID) 50 MCG tablet Take 50 mcg by mouth Daily.      • metoprolol succinate XL (TOPROL-XL) 25 MG 24 hr tablet Take 25 mg by mouth Daily.      • Multiple Vitamin (MULTI-VITAMIN DAILY PO) Take 1 tablet by mouth Daily.      • pantoprazole (PROTONIX) 20 MG EC tablet Take 40 mg by mouth As Needed for Heartburn.   Taking   • Probiotic Product (PROBIOTIC ADVANCED PO) Take 2 capsules by mouth Daily.      • rosuvastatin (CRESTOR) 20 MG tablet Take 20 mg by mouth 3 (Three) Times a Week.      • sildenafil (REVATIO) 20 MG tablet Take 20 mg by mouth As Needed.        Allergies:  Patient has no known allergies.    Objective     Vital Signs            Physical Exam:      General Appearance:    Alert, cooperative, in no acute distress   Head:    Normocephalic, without obvious abnormality, atraumatic   Eyes:            Lids and lashes normal, conjunctivae and sclerae normal, no   icterus, no pallor, corneas clear, PERRLA   Ears:    Ears appear intact with no abnormalities noted   Throat:   No oral lesions, no thrush, oral mucosa moist   Neck:   No adenopathy, supple, trachea midline, no thyromegaly, no   carotid bruit, no JVD   Back:     No kyphosis present, no scoliosis present, no skin lesions,      erythema or scars, no tenderness to percussion or                   palpation,   range of motion normal   Lungs:     Clear to auscultation,respirations regular, even and                  unlabored    Heart:    Regular rhythm and normal rate, normal S1 and S2, no            murmur, no gallop, no rub, no click   Chest Wall:    No abnormalities observed   Abdomen:     Normal bowel sounds, no masses, no organomegaly, soft        non-tender, non-distended, no guarding, no rebound                tenderness   Rectal:     Deferred   Extremities:   Moves all extremities well, no edema, no cyanosis, no             redness   Pulses:   Pulses palpable and equal  bilaterally   Skin:   No bleeding, bruising or rash   Lymph nodes:   No palpable adenopathy   Neurologic:   Cranial nerves 2 - 12 grossly intact, sensation intact, DTR       present and equal bilaterally     Results Review:    I reviewed the patient's new clinical results.  I reviewed the patient's new imaging results and agree with the interpretation.  I reviewed the patient's other test results and agree with the interpretation  I personally viewed and interpreted the patient's EKG/Telemetry data    Assessment/Plan       Asymptomatic bilateral carotid artery stenosis      Right carotid endarterectomy    I discussed the patients findings and my recommendations with patient, family and nursing staff.     Javier Acevedo MD  12/20/18  9:33 AM    Time: 20 minutes

## 2018-12-20 NOTE — PLAN OF CARE
Problem: Patient Care Overview  Goal: Plan of Care Review  Outcome: Ongoing (interventions implemented as appropriate)   12/20/18 5965   Coping/Psychosocial   Plan of Care Reviewed With patient   Plan of Care Review   Progress improving   OTHER   Outcome Summary vss, pain controlled, incision CDI, tolerating clear liquids, continue to monitor.        Problem: Fall Risk (Adult)  Goal: Identify Related Risk Factors and Signs and Symptoms  Outcome: Ongoing (interventions implemented as appropriate)    Goal: Absence of Fall  Outcome: Ongoing (interventions implemented as appropriate)      Problem: Carotid Endarterectomy (Adult)  Goal: Signs and Symptoms of Listed Potential Problems Will be Absent, Minimized or Managed (Carotid Endarterectomy)  Outcome: Ongoing (interventions implemented as appropriate)    Goal: Anesthesia/Sedation Recovery  Outcome: Ongoing (interventions implemented as appropriate)

## 2018-12-21 VITALS
SYSTOLIC BLOOD PRESSURE: 165 MMHG | WEIGHT: 228 LBS | DIASTOLIC BLOOD PRESSURE: 103 MMHG | OXYGEN SATURATION: 97 % | RESPIRATION RATE: 16 BRPM | HEART RATE: 67 BPM | BODY MASS INDEX: 26.92 KG/M2 | TEMPERATURE: 98.1 F | HEIGHT: 77 IN

## 2018-12-21 LAB
ACT BLD: 125 SECONDS (ref 82–152)
ACT BLD: 136 SECONDS (ref 82–152)
ACT BLD: 202 SECONDS (ref 82–152)
ACT BLD: 213 SECONDS (ref 82–152)
ACT BLD: 241 SECONDS (ref 82–152)
ANION GAP SERPL CALCULATED.3IONS-SCNC: 13.4 MMOL/L
APTT PPP: 25.5 SECONDS (ref 22.7–35.4)
BASOPHILS # BLD AUTO: 0.02 10*3/MM3 (ref 0–0.2)
BASOPHILS NFR BLD AUTO: 0.3 % (ref 0–1.5)
BUN BLD-MCNC: 20 MG/DL (ref 8–23)
BUN/CREAT SERPL: 16.8 (ref 7–25)
CALCIUM SPEC-SCNC: 8.6 MG/DL (ref 8.6–10.5)
CHLORIDE SERPL-SCNC: 106 MMOL/L (ref 98–107)
CO2 SERPL-SCNC: 20.6 MMOL/L (ref 22–29)
CREAT BLD-MCNC: 1.19 MG/DL (ref 0.76–1.27)
DEPRECATED RDW RBC AUTO: 45.1 FL (ref 37–54)
EOSINOPHIL # BLD AUTO: 0 10*3/MM3 (ref 0–0.7)
EOSINOPHIL NFR BLD AUTO: 0 % (ref 0.3–6.2)
ERYTHROCYTE [DISTWIDTH] IN BLOOD BY AUTOMATED COUNT: 13.4 % (ref 11.5–14.5)
GFR SERPL CREATININE-BSD FRML MDRD: 60 ML/MIN/1.73
GLUCOSE BLD-MCNC: 162 MG/DL (ref 65–99)
HCT VFR BLD AUTO: 38.1 % (ref 40.4–52.2)
HGB BLD-MCNC: 12.4 G/DL (ref 13.7–17.6)
IMM GRANULOCYTES # BLD AUTO: 0 10*3/MM3 (ref 0–0.03)
IMM GRANULOCYTES NFR BLD AUTO: 0 % (ref 0–0.5)
INR PPP: 1.1 (ref 0.9–1.1)
LYMPHOCYTES # BLD AUTO: 1.2 10*3/MM3 (ref 0.9–4.8)
LYMPHOCYTES NFR BLD AUTO: 17.5 % (ref 19.6–45.3)
MCH RBC QN AUTO: 30.2 PG (ref 27–32.7)
MCHC RBC AUTO-ENTMCNC: 32.5 G/DL (ref 32.6–36.4)
MCV RBC AUTO: 92.7 FL (ref 79.8–96.2)
MONOCYTES # BLD AUTO: 0.4 10*3/MM3 (ref 0.2–1.2)
MONOCYTES NFR BLD AUTO: 5.8 % (ref 5–12)
NEUTROPHILS # BLD AUTO: 5.25 10*3/MM3 (ref 1.9–8.1)
NEUTROPHILS NFR BLD AUTO: 76.4 % (ref 42.7–76)
PLATELET # BLD AUTO: 185 10*3/MM3 (ref 140–500)
PMV BLD AUTO: 10.4 FL (ref 6–12)
POTASSIUM BLD-SCNC: 4.5 MMOL/L (ref 3.5–5.2)
PROTHROMBIN TIME: 14 SECONDS (ref 11.7–14.2)
RBC # BLD AUTO: 4.11 10*6/MM3 (ref 4.6–6)
SODIUM BLD-SCNC: 140 MMOL/L (ref 136–145)
WBC NRBC COR # BLD: 6.87 10*3/MM3 (ref 4.5–10.7)

## 2018-12-21 PROCEDURE — 85025 COMPLETE CBC W/AUTO DIFF WBC: CPT | Performed by: SURGERY

## 2018-12-21 PROCEDURE — 80048 BASIC METABOLIC PNL TOTAL CA: CPT | Performed by: SURGERY

## 2018-12-21 PROCEDURE — 85730 THROMBOPLASTIN TIME PARTIAL: CPT | Performed by: SURGERY

## 2018-12-21 PROCEDURE — 85610 PROTHROMBIN TIME: CPT | Performed by: SURGERY

## 2018-12-21 PROCEDURE — 25010000003 CEFAZOLIN IN DEXTROSE 2-4 GM/100ML-% SOLUTION: Performed by: SURGERY

## 2018-12-21 RX ORDER — HYDROCODONE BITARTRATE AND ACETAMINOPHEN 5; 325 MG/1; MG/1
1 TABLET ORAL EVERY 4 HOURS PRN
Qty: 10 TABLET | Refills: 0 | Status: SHIPPED | OUTPATIENT
Start: 2018-12-21 | End: 2018-12-30

## 2018-12-21 RX ADMIN — CEFAZOLIN SODIUM 2 G: 2 INJECTION, SOLUTION INTRAVENOUS at 03:37

## 2018-12-21 RX ADMIN — ROSUVASTATIN CALCIUM 20 MG: 20 TABLET, FILM COATED ORAL at 10:04

## 2018-12-21 RX ADMIN — METOPROLOL SUCCINATE 25 MG: 25 TABLET, FILM COATED, EXTENDED RELEASE ORAL at 09:58

## 2018-12-21 NOTE — PROGRESS NOTES
Javier Acevedo MD       LOS: 1 day   Patient Care Team:  Madison Aparicio MD as PCP - General  Madison Aparicio MD as PCP - Family Medicine  Madison Aparicio MD as PCP - Claims Attributed    Subjective     73 y.o. male doing well after right carotid endarterectomy.  No nausea or headache. Expected right pre-incisional numbness present.    Patient with atrial fibrillation on eliquis. He has seen Dr Powers preoperatively and suitable candidate for surgery. He has history of prostate cancer treated. He has degenerative back disease stable. Hyperlipidemia and hypertension controlled medically.          Review of Systems  Review of Systems - Negative except no cardiopulmonary symptoms      Objective     Vital Signs  Temp:  [97.6 °F (36.4 °C)-98.1 °F (36.7 °C)] 98 °F (36.7 °C)  Heart Rate:  [56-84] 77  Resp:  [14-16] 16  BP: (104-182)/() 143/90  Arterial Line BP: (1-157)/(1-121) 131/59    Physical Exam  General: No acute distress. Alert and oriented x 4  HEENT: No jugular venous distension, trachea is midline, right neck incision healing well with mild bruising but no hematoma  CV: RRR, S1S2  Resp: Clear unlabored breathing on oxygen  Abd: Abdomen is soft, nontender, nondistended  Extremities: Good strength in all extremities    Results Review:       Recent Results (from the past 12 hour(s))   Basic Metabolic Panel    Collection Time: 12/21/18  4:41 AM   Result Value Ref Range    Glucose 162 (H) 65 - 99 mg/dL    BUN 20 8 - 23 mg/dL    Creatinine 1.19 0.76 - 1.27 mg/dL    Sodium 140 136 - 145 mmol/L    Potassium 4.5 3.5 - 5.2 mmol/L    Chloride 106 98 - 107 mmol/L    CO2 20.6 (L) 22.0 - 29.0 mmol/L    Calcium 8.6 8.6 - 10.5 mg/dL    eGFR Non African Amer 60 (L) >60 mL/min/1.73    BUN/Creatinine Ratio 16.8 7.0 - 25.0    Anion Gap 13.4 mmol/L   aPTT    Collection Time: 12/21/18  4:41 AM   Result Value Ref Range    PTT 25.5 22.7 - 35.4 seconds   Protime-INR    Collection Time: 12/21/18  4:41 AM   Result Value Ref Range     Protime 14.0 11.7 - 14.2 Seconds    INR 1.10 0.90 - 1.10   CBC Auto Differential    Collection Time: 12/21/18  4:41 AM   Result Value Ref Range    WBC 6.87 4.50 - 10.70 10*3/mm3    RBC 4.11 (L) 4.60 - 6.00 10*6/mm3    Hemoglobin 12.4 (L) 13.7 - 17.6 g/dL    Hematocrit 38.1 (L) 40.4 - 52.2 %    MCV 92.7 79.8 - 96.2 fL    MCH 30.2 27.0 - 32.7 pg    MCHC 32.5 (L) 32.6 - 36.4 g/dL    RDW 13.4 11.5 - 14.5 %    RDW-SD 45.1 37.0 - 54.0 fl    MPV 10.4 6.0 - 12.0 fL    Platelets 185 140 - 500 10*3/mm3    Neutrophil % 76.4 (H) 42.7 - 76.0 %    Lymphocyte % 17.5 (L) 19.6 - 45.3 %    Monocyte % 5.8 5.0 - 12.0 %    Eosinophil % 0.0 (L) 0.3 - 6.2 %    Basophil % 0.3 0.0 - 1.5 %    Immature Grans % 0.0 0.0 - 0.5 %    Neutrophils, Absolute 5.25 1.90 - 8.10 10*3/mm3    Lymphocytes, Absolute 1.20 0.90 - 4.80 10*3/mm3    Monocytes, Absolute 0.40 0.20 - 1.20 10*3/mm3    Eosinophils, Absolute 0.00 0.00 - 0.70 10*3/mm3    Basophils, Absolute 0.02 0.00 - 0.20 10*3/mm3    Immature Grans, Absolute 0.00 0.00 - 0.03 10*3/mm3   ]      Assessment/Plan             Asymptomatic bilateral carotid artery stenosis      Assessment & Plan  73 y.o. male doing well after right carotid endarterectomy.  Home after breakfast and ambulate.  Discharge instructions and activity and follow-up all given to patient.  Discussed with nurse as well present in room.      Javier Acevedo MD  12/21/18  6:31 AM

## 2018-12-21 NOTE — DISCHARGE INSTRUCTIONS
[facility]  Sav Dwons, Vanessa Jackson Rachel, Scherrer, Thomas  [address]  [place] [address]  [phone]    Carotid Endartectomy Discharge Instructions:    Activity  · Do not lift anything heavier than 5 pounds (a gallon of milk); no bending, straining, or strenuous activity for the first 2 weeks.  · No driving for 7 days or while taking prescription pain medications. You may ride in a car. It is important that you have the ability to turn your head quickly without discomfort whileoperating a vehicle. This may not be possible for some people for 1-2 weeks after surgery.  · Walk as often as you wish. Walk short distances at first and increase slowly. Avoid exercising inextreme temperatures.  · You may have some slight dizziness, a mild headache or tiredness for a few days.  · You may go up and down stairs. Hold onto the rail for the first few days after surgery because you may experience dizziness.  · If you smoke, please quit. Smoking increases you chances of developing heart disease, carotid artery disease, lung cancer, and peripheral vascular disease. It can also delay wound healing.    Personal Hygiene/Shower  · ?You may shower the next day after your surgery.  · Use soap and water to gently clean the incision. Do not scrub the incision. Pat dry with a clean towel.  · Do not soak in a tub, whirlpool, or hot tub, or go swimming until the incision is completely healed. This is generally 3-4 weeks for most people.    Diet  · Resume the diet you were on before your surgery unless otherwise directed.  · For most people a low saturated fat and cholesterol diet which is high in fruits, vegetables and whole grains is a good healthy diet unless otherwise directed by your doctor.    Incision  · There will be an area of numbness along the incision in the neck and toward the chin. The earlobe may also be affected. This generally goes away and may last for 6-12 months.  · Your  neck incision is about is about 3-4 inches in length. The sutures under the skin will dissolve on their own. Take a close look at the incision in the mirror so that you will know what it looks like before leaving the hospital and will notice changes if they occur at home.  · For the first couple of days sleep on a couple of pillows to help with the swelling in the neck around the incision.  · You may apply an ice pack for the first 48 hours after surgery to the neck incision. It should be applied for 15 minutes, then off for 15 minutes. This may help with swelling and discomfort.  · Men may find it more difficult to shave with a blade and may switch to an electric razor. Do not shave over the incision; go around it until the incision is healed.  · Your incision will take several months to heal completely. It may feel raised and thickened for a while and will decrease over time. It takes 2-3 weeks for the swelling to go away. No ointments, lotions, creams or bandages should be applied to the incision.  · Most people do not have very much pain with this surgery. You can take over the counter Tylenol (Acetaminophen) for mild discomfort. Take it was directed on the packaging. You will be prescribed a pain medication for moderate discomfort, take it as directed. Do NOT take additional Tylenol with prescription pain medication. One of the side effects of pain medications is constipation and nausea. Most people will have nausea if it is taken on an empty stomach. Eat a small snack with pain medication to avoid this side effect. Drinking plenty of fluid and eating high fiber foods (fruits, vegetables and whole grains) can help prevent constipation. If needed you can take Docusate Sodium (Colace) 100 mg, a stool softener, once or twice a day. This can be purchased at any drug store. A laxative may be needed if the constipation continues. Generally, an over the counter laxative, such as Dulcolax tablets, is recommended (take  it as directed on the manufacturers packaging).  · Your updated medication list will be given to you before you leave the hospital. New prescriptions will be provided to you and education regarding new medications provided.    Call Your Surgeon for Any of These Symptoms @ [phone]    · Increasing pain or swelling in the neck causing difficulty breathing or swallowing.   · Sudden or severe headache. A headache that will not go away.  · Changes in your eyesight, problem speaking, or problem swallowing.  · Weakness on one side of your body.  · Increasing pain, not relieved by pain medication.  · Fever above 101 degrees.  · Redness, an increase in swelling or bruising around your incision. There may be some slight drainage the first couple of days from the incision, but this should stop and the incision should be dry. If there is continued drainage or pus the surgeon should be called. If you cannot reach your doctor, go to the nearest emergency room for immediate assessment.    Reducing Your Risks  · All patients with vascular disease should take important steps to prevent worsening of their condition or development of new disease. It is very important that if you smoke to quit. Good medical management of high cholesterol, high blood pressure, and diabetes, along with maintaining a normal body weight is encouraged to all of our patients. This is one of the reasons why routine follow-up with your primary care physician is very important to your continued health and well-being.    Follow-up appointment  · A follow-up appointment will be provided for you before you leave the hospital 1-4 weeks after your surgery. During this or the next visit you will undergo carotid artery duplex scanning. It is extremely important that you make this appointment because we need to evaluate the post-surgical carotid artery for normal blood flow.    Returning to Work  · This is generally discussed at the follow-up visit. If you have a  desk job, you may be able to return to work earlier than someone with a job requiring physical labor. If you want to return to work earlier than 4 weeks, this should be discussed with your surgeon prior to leaving the hospital.

## 2018-12-21 NOTE — PLAN OF CARE
Problem: Patient Care Overview  Goal: Plan of Care Review  Outcome: Ongoing (interventions implemented as appropriate)   12/21/18 0402   Coping/Psychosocial   Plan of Care Reviewed With patient   Plan of Care Review   Progress improving   OTHER   Outcome Summary VSS. No c/o pain. Neuro intact, incision c/d/i and sofe with small amout of bruising. Voiding good per urinal. Plan to dc in am. Will continue to monitor.      Goal: Individualization and Mutuality  Outcome: Ongoing (interventions implemented as appropriate)    Goal: Discharge Needs Assessment  Outcome: Ongoing (interventions implemented as appropriate)    Goal: Interprofessional Rounds/Family Conf  Outcome: Ongoing (interventions implemented as appropriate)      Problem: Fall Risk (Adult)  Goal: Identify Related Risk Factors and Signs and Symptoms  Outcome: Ongoing (interventions implemented as appropriate)    Goal: Absence of Fall  Outcome: Ongoing (interventions implemented as appropriate)      Problem: Carotid Endarterectomy (Adult)  Goal: Signs and Symptoms of Listed Potential Problems Will be Absent, Minimized or Managed (Carotid Endarterectomy)  Outcome: Ongoing (interventions implemented as appropriate)    Goal: Anesthesia/Sedation Recovery  Outcome: Ongoing (interventions implemented as appropriate)

## 2018-12-21 NOTE — DISCHARGE SUMMARY
"    Name: Lucas Maynard ADMIT: 2018   : 1945  PCP: Madison Aparicio MD    MRN: [id number] LOS: 1 days   AGE/SEX: 73 y.o. male  ROOM: 59/     Date of Admission: 2018    Date of Discharge:  2018    PCP: Madison Aparicio MD    DISCHARGE DIAGNOSIS  Active Hospital Problems    Diagnosis Date Noted   • Asymptomatic bilateral carotid artery stenosis [I65.23] 2018      Resolved Hospital Problems   No resolved problems to display.       SECONDARY DIAGNOSES  Past Medical History:   Diagnosis Date   • CAD (coronary artery disease)    • DDD (degenerative disc disease), lumbar    • Disease of thyroid gland    • GERD (gastroesophageal reflux disease)    • Hyperlipidemia    • Murmur, heart        CONSULTS   Consults     No orders found for last 30 day(s).          PROCEDURES PERFORMED    Procedure(s):  RIGHT CAROTID ENDARTERECTOMY       HOSPITAL COURSE    Patient is a 73 y.o. male presented to [facility] admitted for severe right 95% and left 80% carotid stenosis asymptomatic.  Please see the admitting history and physical for further details. Diagnosis on admission was Asymptomatic bilateral carotid artery stenosis [I65.23].  Patient underwent right carotid endarterectomy.  He was admitted postoperatively.  He had no problems.  He had advancement of diet and ambulation without problems.  Right neck incision healing well.  He had no nausea or headache.  He did have pre-incisional numbness as expected.  Patient was to be discharged after breakfast and ambulate.  I will follow with patient in 1 week.  Instructions and activity discussed with patient with nurse present.  Plan for staged left carotid endarterectomy in future for severe asymptomatic stenosis once recovered.      VITAL SIGNS  /90   Pulse 77   Temp 98 °F (36.7 °C) (Oral)   Resp 16   Ht 195.6 cm (77\")   Wt 103 kg (228 lb)   SpO2 94%   BMI 27.04 kg/m²    Flowsheet Rows      First Filed Value   Admission Height  " "195.6 cm (77\") Documented at 12/20/2018 1500   Admission Weight  103 kg (228 lb) Documented at 12/20/2018 1500          Physical Exam:     General Appearance:    Alert, cooperative, in no acute distress   Head:    Normocephalic, without obvious abnormality, atraumatic   Eyes:            Lids and lashes normal, conjunctivae and sclerae normal, no   icterus, no pallor, corneas clear, PERRLA   Ears:    Ears appear intact with no abnormalities noted   Throat:   No oral lesions, no thrush, oral mucosa moist   Neck:   No adenopathy, supple, trachea midline, no thyromegaly, no   carotid bruit, no JVD, right neck incision healing well    Back:     No kyphosis present, no scoliosis present, no skin lesions,      erythema or scars, no tenderness to percussion or                   palpation,   range of motion normal   Lungs:     Clear to auscultation,respirations regular, even and                  unlabored    Heart:    Regular rhythm and normal rate, normal S1 and S2, no            murmur, no gallop, no rub, no click   Chest Wall:    No abnormalities observed   Abdomen:     Normal bowel sounds, no masses, no organomegaly, soft        non-tender, non-distended, no guarding, no rebound                tenderness   Rectal:     Deferred   Extremities:   Moves all extremities well, no edema, no cyanosis, no             redness   Pulses:   Pulses palpable and equal bilaterally   Skin:   No bleeding, bruising or rash   Lymph nodes:   No palpable adenopathy   Neurologic:   Cranial nerves 2 - 12 grossly intact, sensation intact, DTR       present and equal bilaterally       Pertinent Test Results: Normal intraoperative duplex scan after right carotid endarterectomy    CONDITION ON DISCHARGE  Stable.      DISCHARGE DISPOSITION   Home or Self Care      DISCHARGE MEDICATIONS       Discharge Medications      New Medications      Instructions Start Date   HYDROcodone-acetaminophen 5-325 MG per tablet  Commonly known as:  NORCO   1 tablet, " Oral, Every 4 Hours PRN         Continue These Medications      Instructions Start Date   aspirin 81 MG chewable tablet   81 mg, Oral, Daily      ELIQUIS 5 MG tablet tablet  Generic drug:  apixaban   5 mg, Oral, Every 12 Hours Scheduled      ipratropium 0.03 % nasal spray  Commonly known as:  ATROVENT   2 sprays, Nasal, Every 12 Hours      levothyroxine 50 MCG tablet  Commonly known as:  SYNTHROID, LEVOTHROID   50 mcg, Oral, Daily      metoprolol succinate XL 25 MG 24 hr tablet  Commonly known as:  TOPROL-XL   25 mg, Oral, Daily      MULTI-VITAMIN DAILY PO   1 tablet, Oral, Daily      pantoprazole 20 MG EC tablet  Commonly known as:  PROTONIX   40 mg, Oral, As Needed      PROBIOTIC ADVANCED PO   2 capsules, Oral, Daily      rosuvastatin 20 MG tablet  Commonly known as:  CRESTOR   20 mg, Oral, 3 Times Weekly      sildenafil 20 MG tablet  Commonly known as:  REVATIO   20 mg, Oral, As Needed             TEST  RESULTS PENDING AT DISCHARGE        Discharge Diet:   Diet Instructions     Diet: Regular, Consistent Carbohydrate, Cardiac      Discharge Diet:   Regular  Consistent Carbohydrate  Cardiac             Activity at Discharge:   Activity Instructions     Activity as Tolerated            Follow-up Appointments    No future appointments.  Additional Instructions for the Follow-ups that You Need to Schedule     Discharge Follow-up with Specified Provider: Dr. Acevedo; 1 Week   As directed      To:  Dr. Acevedo    Follow Up:  1 Week             Additional Instructions for the Follow-ups that You Need to Schedule     Discharge Follow-up with Specified Provider: Dr. Acevedo; 1 Week   As directed      To:  Dr. Acevedo    Follow Up:  1 Week           Follow-up Information     Madison Aparicio MD .    Specialty:  Internal Medicine  Contact information:  [address] [place] [address]  [phone]                      [place]    Javier Acevedo MD  Office Number [phone]  [phone]    12/21/18  7:04 AM

## 2018-12-22 ENCOUNTER — READMISSION MANAGEMENT (OUTPATIENT)
Dept: CALL CENTER | Facility: HOSPITAL | Age: 73
End: 2018-12-22

## 2018-12-23 NOTE — OUTREACH NOTE
Prep Survey      Responses   Facility patient discharged from?  [facility]   Is patient eligible?  Yes   Discharge diagnosis  Right carotid Endarterectomy this visit   Does the patient have one of the following disease processes/diagnoses(primary or secondary)?  General Surgery   Does the patient have Home health ordered?  No   Is there a DME ordered?  No   Prep survey completed?  Yes          Kamryn Lomax RN

## 2018-12-26 ENCOUNTER — READMISSION MANAGEMENT (OUTPATIENT)
Dept: CALL CENTER | Facility: HOSPITAL | Age: 73
End: 2018-12-26

## 2018-12-26 NOTE — OUTREACH NOTE
General Surgery Week 1 Survey      Responses   Facility patient discharged from?  [facility]   Does the patient have one of the following disease processes/diagnoses(primary or secondary)?  General Surgery   Is there a successful TCM telephone encounter documented?  No   Week 1 attempt successful?  Yes   Call start time  1442   Call end time  1444   Discharge diagnosis  Right carotid Endarterectomy   Is patient permission given to speak with other caregiver?  No   Meds reviewed with patient/caregiver?  Yes   Is the patient having any side effects they believe may be caused by any medication additions or changes?  No   Does the patient have all medications related to this admission filled (includes all antibiotics, pain medications, etc.)  Yes   Is the patient taking all medications as directed (includes completed medication regime)?  Yes   Does the patient have a follow up appointment scheduled with their surgeon?  Yes   Has the patient kept scheduled appointments due by today?  N/A   Comments  12/27/2018  surgeon   Has home health visited the patient within 72 hours of discharge?  N/A   Psychosocial issues?  No   Did the patient receive a copy of their discharge instructions?  Yes   Nursing interventions  Reviewed instructions with patient   What is the patient's perception of their health status since discharge?  Improving   Nursing interventions  Nurse provided patient education   Is the patient /caregiver able to teach back basic post-op care?  Keep incision areas clean,dry and protected, Take showers only when approved by MD-sponge bathe until then, No tub bath, swimming, or hot tub until instructed by MD, Drive as instructed by MD in discharge instructions, Lifting as instructed by MD in discharge instructions   Is the patient/caregiver able to teach back signs and symptoms of incisional infection?  Increased redness, swelling or pain at the incisonal site, Increased drainage or bleeding, Incisional warmth,  Pus or odor from incision, Fever   Is the patient/caregiver able to teach back steps to recovery at home?  Set small, achievable goals for return to baseline health, Rest and rebuild strength, gradually increase activity   Is the patient/caregiver able to teach back the hierarchy of who to call/visit for symptoms/problems? PCP, Specialist, Home health nurse, Urgent Care, ED, 911  Yes   Week 1 call completed?  Yes          Siomara Brewer RN

## 2019-06-26 ENCOUNTER — APPOINTMENT (OUTPATIENT)
Dept: PREADMISSION TESTING | Facility: HOSPITAL | Age: 74
End: 2019-06-26

## 2019-06-26 ENCOUNTER — HOSPITAL ENCOUNTER (OUTPATIENT)
Dept: GENERAL RADIOLOGY | Facility: HOSPITAL | Age: 74
Discharge: HOME | End: 2019-06-26
Admitting: SURGERY

## 2019-06-26 VITALS
HEART RATE: 68 BPM | HEIGHT: 77 IN | RESPIRATION RATE: 20 BRPM | SYSTOLIC BLOOD PRESSURE: 133 MMHG | WEIGHT: 236 LBS | TEMPERATURE: 97.4 F | DIASTOLIC BLOOD PRESSURE: 75 MMHG | BODY MASS INDEX: 27.87 KG/M2 | OXYGEN SATURATION: 98 %

## 2019-06-26 LAB
ALBUMIN SERPL-MCNC: 4.3 G/DL (ref 3.5–5.2)
ALBUMIN/GLOB SERPL: 1.5 G/DL
ALP SERPL-CCNC: 65 U/L (ref 39–117)
ALT SERPL W P-5'-P-CCNC: 19 U/L (ref 1–41)
ANION GAP SERPL CALCULATED.3IONS-SCNC: 11 MMOL/L (ref 5–15)
APTT PPP: 25.8 SECONDS (ref 22.7–35.4)
AST SERPL-CCNC: 18 U/L (ref 1–40)
BACTERIA UR QL AUTO: NORMAL /HPF
BILIRUB SERPL-MCNC: 1 MG/DL (ref 0.2–1.2)
BILIRUB UR QL STRIP: NEGATIVE
BUN BLD-MCNC: 24 MG/DL (ref 8–23)
BUN/CREAT SERPL: 17.4 (ref 7–25)
CALCIUM SPEC-SCNC: 9.5 MG/DL (ref 8.6–10.5)
CHLORIDE SERPL-SCNC: 103 MMOL/L (ref 98–107)
CLARITY UR: CLEAR
CO2 SERPL-SCNC: 26 MMOL/L (ref 22–29)
COLOR UR: ABNORMAL
CREAT BLD-MCNC: 1.38 MG/DL (ref 0.76–1.27)
DEPRECATED RDW RBC AUTO: 46.3 FL (ref 37–54)
ERYTHROCYTE [DISTWIDTH] IN BLOOD BY AUTOMATED COUNT: 13.6 % (ref 12.3–15.4)
GFR SERPL CREATININE-BSD FRML MDRD: 50 ML/MIN/1.73
GLOBULIN UR ELPH-MCNC: 2.9 GM/DL
GLUCOSE BLD-MCNC: 87 MG/DL (ref 65–99)
GLUCOSE UR STRIP-MCNC: NEGATIVE MG/DL
HCT VFR BLD AUTO: 43.9 % (ref 37.5–51)
HGB BLD-MCNC: 14.6 G/DL (ref 13–17.7)
HGB UR QL STRIP.AUTO: NEGATIVE
HYALINE CASTS UR QL AUTO: NORMAL /LPF
INR PPP: 0.97 (ref 0.9–1.1)
KETONES UR QL STRIP: ABNORMAL
LEUKOCYTE ESTERASE UR QL STRIP.AUTO: NEGATIVE
MCH RBC QN AUTO: 31.1 PG (ref 26.6–33)
MCHC RBC AUTO-ENTMCNC: 33.3 G/DL (ref 31.5–35.7)
MCV RBC AUTO: 93.6 FL (ref 79–97)
NITRITE UR QL STRIP: NEGATIVE
PH UR STRIP.AUTO: <=5 [PH] (ref 5–8)
PLATELET # BLD AUTO: 221 10*3/MM3 (ref 140–450)
PMV BLD AUTO: 10.2 FL (ref 6–12)
POTASSIUM BLD-SCNC: 4.5 MMOL/L (ref 3.5–5.2)
PROT SERPL-MCNC: 7.2 G/DL (ref 6–8.5)
PROT UR QL STRIP: ABNORMAL
PROTHROMBIN TIME: 12.6 SECONDS (ref 11.7–14.2)
RBC # BLD AUTO: 4.69 10*6/MM3 (ref 4.14–5.8)
RBC # UR: NORMAL /HPF
REF LAB TEST METHOD: NORMAL
SODIUM BLD-SCNC: 140 MMOL/L (ref 136–145)
SP GR UR STRIP: 1.03 (ref 1–1.03)
SQUAMOUS #/AREA URNS HPF: NORMAL /HPF
UROBILINOGEN UR QL STRIP: ABNORMAL
WBC NRBC COR # BLD: 4.68 10*3/MM3 (ref 3.4–10.8)
WBC UR QL AUTO: NORMAL /HPF

## 2019-06-26 PROCEDURE — 85730 THROMBOPLASTIN TIME PARTIAL: CPT | Performed by: SURGERY

## 2019-06-26 PROCEDURE — 93005 ELECTROCARDIOGRAM TRACING: CPT

## 2019-06-26 PROCEDURE — 80053 COMPREHEN METABOLIC PANEL: CPT | Performed by: SURGERY

## 2019-06-26 PROCEDURE — 85610 PROTHROMBIN TIME: CPT | Performed by: SURGERY

## 2019-06-26 PROCEDURE — 93010 ELECTROCARDIOGRAM REPORT: CPT | Performed by: INTERNAL MEDICINE

## 2019-06-26 PROCEDURE — 85027 COMPLETE CBC AUTOMATED: CPT | Performed by: SURGERY

## 2019-06-26 PROCEDURE — 71046 X-RAY EXAM CHEST 2 VIEWS: CPT

## 2019-06-26 PROCEDURE — 36415 COLL VENOUS BLD VENIPUNCTURE: CPT

## 2019-06-26 PROCEDURE — 81001 URINALYSIS AUTO W/SCOPE: CPT | Performed by: SURGERY

## 2019-06-26 RX ORDER — ASPIRIN 81 MG/1
81 TABLET ORAL DAILY
COMMUNITY

## 2019-06-26 RX ORDER — PANTOPRAZOLE SODIUM 40 MG/1
40 TABLET, DELAYED RELEASE ORAL NIGHTLY
COMMUNITY

## 2019-07-03 ENCOUNTER — APPOINTMENT (OUTPATIENT)
Dept: GENERAL RADIOLOGY | Facility: HOSPITAL | Age: 74
End: 2019-07-03

## 2019-07-03 ENCOUNTER — APPOINTMENT (OUTPATIENT)
Dept: CARDIOLOGY | Facility: HOSPITAL | Age: 74
End: 2019-07-03

## 2019-07-03 ENCOUNTER — ANESTHESIA (OUTPATIENT)
Dept: PERIOP | Facility: HOSPITAL | Age: 74
End: 2019-07-03

## 2019-07-03 ENCOUNTER — HOSPITAL ENCOUNTER (INPATIENT)
Facility: HOSPITAL | Age: 74
LOS: 1 days | Discharge: HOME | End: 2019-07-04
Attending: SURGERY | Admitting: SURGERY

## 2019-07-03 ENCOUNTER — ANESTHESIA EVENT (OUTPATIENT)
Dept: PERIOP | Facility: HOSPITAL | Age: 74
End: 2019-07-03

## 2019-07-03 PROBLEM — I65.22 CAROTID STENOSIS, ASYMPTOMATIC, LEFT: Status: ACTIVE | Noted: 2019-07-03

## 2019-07-03 LAB
BH CV XLRA MEAS LEFT DIST CCA EDV: 14.3 CM/SEC
BH CV XLRA MEAS LEFT DIST CCA PSV: 74.1 CM/SEC
BH CV XLRA MEAS LEFT DIST ICA EDV: 14.9 CM/SEC
BH CV XLRA MEAS LEFT DIST ICA PSV: 41.8 CM/SEC
BH CV XLRA MEAS LEFT PROX ECA EDV: 4.21 CM/SEC
BH CV XLRA MEAS LEFT PROX ECA PSV: 61 CM/SEC
BH CV XLRA MEAS LEFT PROX ICA EDV: 16.2 CM/SEC
BH CV XLRA MEAS LEFT PROX ICA PSV: 42.1 CM/SEC

## 2019-07-03 PROCEDURE — 25010000003 CEFAZOLIN IN DEXTROSE 2-4 GM/100ML-% SOLUTION: Performed by: SURGERY

## 2019-07-03 PROCEDURE — 25010000002 HEPARIN (PORCINE) PER 1000 UNITS: Performed by: NURSE ANESTHETIST, CERTIFIED REGISTERED

## 2019-07-03 PROCEDURE — 25010000002 HYDROMORPHONE PER 4 MG: Performed by: NURSE ANESTHETIST, CERTIFIED REGISTERED

## 2019-07-03 PROCEDURE — 25010000002 MIDAZOLAM PER 1 MG: Performed by: ANESTHESIOLOGY

## 2019-07-03 PROCEDURE — 25010000003 CEFAZOLIN PER 500 MG: Performed by: SURGERY

## 2019-07-03 PROCEDURE — 71045 X-RAY EXAM CHEST 1 VIEW: CPT

## 2019-07-03 PROCEDURE — 03CL0ZZ EXTIRPATION OF MATTER FROM LEFT INTERNAL CAROTID ARTERY, OPEN APPROACH: ICD-10-PCS | Performed by: SURGERY

## 2019-07-03 PROCEDURE — 25010000002 PHENYLEPHRINE PER 1 ML: Performed by: NURSE ANESTHETIST, CERTIFIED REGISTERED

## 2019-07-03 PROCEDURE — 25010000002 HEPARIN (PORCINE) PER 1000 UNITS: Performed by: SURGERY

## 2019-07-03 PROCEDURE — 85347 COAGULATION TIME ACTIVATED: CPT

## 2019-07-03 PROCEDURE — 25010000002 FENTANYL CITRATE (PF) 100 MCG/2ML SOLUTION: Performed by: NURSE ANESTHETIST, CERTIFIED REGISTERED

## 2019-07-03 PROCEDURE — 25010000002 FENTANYL CITRATE (PF) 100 MCG/2ML SOLUTION: Performed by: ANESTHESIOLOGY

## 2019-07-03 PROCEDURE — 25010000002 DEXAMETHASONE PER 1 MG: Performed by: NURSE ANESTHETIST, CERTIFIED REGISTERED

## 2019-07-03 PROCEDURE — 25010000002 PROPOFOL 10 MG/ML EMULSION: Performed by: NURSE ANESTHETIST, CERTIFIED REGISTERED

## 2019-07-03 PROCEDURE — C1768 GRAFT, VASCULAR: HCPCS | Performed by: SURGERY

## 2019-07-03 PROCEDURE — 25010000002 HYDRALAZINE PER 20 MG: Performed by: NURSE ANESTHETIST, CERTIFIED REGISTERED

## 2019-07-03 PROCEDURE — 25010000002 ONDANSETRON PER 1 MG: Performed by: NURSE ANESTHETIST, CERTIFIED REGISTERED

## 2019-07-03 PROCEDURE — 25010000002 PROTAMINE SULFATE PER 10 MG: Performed by: NURSE ANESTHETIST, CERTIFIED REGISTERED

## 2019-07-03 PROCEDURE — 03UL0KZ SUPPLEMENT LEFT INTERNAL CAROTID ARTERY WITH NONAUTOLOGOUS TISSUE SUBSTITUTE, OPEN APPROACH: ICD-10-PCS | Performed by: SURGERY

## 2019-07-03 PROCEDURE — 93882 EXTRACRANIAL UNI/LTD STUDY: CPT

## 2019-07-03 RX ORDER — DIPHENHYDRAMINE HCL 25 MG
25 CAPSULE ORAL
Status: DISCONTINUED | OUTPATIENT
Start: 2019-07-03 | End: 2019-07-03 | Stop reason: HOSPADM

## 2019-07-03 RX ORDER — SODIUM CHLORIDE 0.9 % (FLUSH) 0.9 %
1-10 SYRINGE (ML) INJECTION AS NEEDED
Status: DISCONTINUED | OUTPATIENT
Start: 2019-07-03 | End: 2019-07-03 | Stop reason: HOSPADM

## 2019-07-03 RX ORDER — FENTANYL CITRATE 50 UG/ML
50 INJECTION, SOLUTION INTRAMUSCULAR; INTRAVENOUS
Status: DISCONTINUED | OUTPATIENT
Start: 2019-07-03 | End: 2019-07-03 | Stop reason: HOSPADM

## 2019-07-03 RX ORDER — ASPIRIN 81 MG/1
81 TABLET ORAL DAILY
Status: DISCONTINUED | OUTPATIENT
Start: 2019-07-04 | End: 2019-07-04 | Stop reason: HOSPADM

## 2019-07-03 RX ORDER — PROMETHAZINE HYDROCHLORIDE 25 MG/ML
12.5 INJECTION, SOLUTION INTRAMUSCULAR; INTRAVENOUS ONCE AS NEEDED
Status: DISCONTINUED | OUTPATIENT
Start: 2019-07-03 | End: 2019-07-03 | Stop reason: HOSPADM

## 2019-07-03 RX ORDER — MIDAZOLAM HYDROCHLORIDE 1 MG/ML
2 INJECTION INTRAMUSCULAR; INTRAVENOUS
Status: DISCONTINUED | OUTPATIENT
Start: 2019-07-03 | End: 2019-07-03 | Stop reason: HOSPADM

## 2019-07-03 RX ORDER — ONDANSETRON 2 MG/ML
INJECTION INTRAMUSCULAR; INTRAVENOUS AS NEEDED
Status: DISCONTINUED | OUTPATIENT
Start: 2019-07-03 | End: 2019-07-03 | Stop reason: SURG

## 2019-07-03 RX ORDER — HYDROCODONE BITARTRATE AND ACETAMINOPHEN 7.5; 325 MG/1; MG/1
1 TABLET ORAL ONCE AS NEEDED
Status: DISCONTINUED | OUTPATIENT
Start: 2019-07-03 | End: 2019-07-03 | Stop reason: HOSPADM

## 2019-07-03 RX ORDER — PANTOPRAZOLE SODIUM 40 MG/1
40 TABLET, DELAYED RELEASE ORAL NIGHTLY
Status: DISCONTINUED | OUTPATIENT
Start: 2019-07-03 | End: 2019-07-04 | Stop reason: HOSPADM

## 2019-07-03 RX ORDER — FAMOTIDINE 10 MG/ML
20 INJECTION, SOLUTION INTRAVENOUS ONCE
Status: COMPLETED | OUTPATIENT
Start: 2019-07-03 | End: 2019-07-03

## 2019-07-03 RX ORDER — SODIUM CHLORIDE, SODIUM LACTATE, POTASSIUM CHLORIDE, CALCIUM CHLORIDE 600; 310; 30; 20 MG/100ML; MG/100ML; MG/100ML; MG/100ML
50 INJECTION, SOLUTION INTRAVENOUS CONTINUOUS
Status: DISCONTINUED | OUTPATIENT
Start: 2019-07-03 | End: 2019-07-04

## 2019-07-03 RX ORDER — NITROGLYCERIN 5 MG/ML
INJECTION, SOLUTION INTRAVENOUS AS NEEDED
Status: DISCONTINUED | OUTPATIENT
Start: 2019-07-03 | End: 2019-07-03 | Stop reason: SURG

## 2019-07-03 RX ORDER — OXYCODONE AND ACETAMINOPHEN 7.5; 325 MG/1; MG/1
1 TABLET ORAL ONCE AS NEEDED
Status: DISCONTINUED | OUTPATIENT
Start: 2019-07-03 | End: 2019-07-03 | Stop reason: HOSPADM

## 2019-07-03 RX ORDER — FENTANYL CITRATE 50 UG/ML
INJECTION, SOLUTION INTRAMUSCULAR; INTRAVENOUS AS NEEDED
Status: DISCONTINUED | OUTPATIENT
Start: 2019-07-03 | End: 2019-07-03 | Stop reason: SURG

## 2019-07-03 RX ORDER — NALOXONE HCL 0.4 MG/ML
0.4 VIAL (ML) INJECTION
Status: DISCONTINUED | OUTPATIENT
Start: 2019-07-03 | End: 2019-07-04 | Stop reason: HOSPADM

## 2019-07-03 RX ORDER — HYDRALAZINE HYDROCHLORIDE 20 MG/ML
5 INJECTION INTRAMUSCULAR; INTRAVENOUS
Status: DISCONTINUED | OUTPATIENT
Start: 2019-07-03 | End: 2019-07-03 | Stop reason: HOSPADM

## 2019-07-03 RX ORDER — ROCURONIUM BROMIDE 10 MG/ML
INJECTION, SOLUTION INTRAVENOUS AS NEEDED
Status: DISCONTINUED | OUTPATIENT
Start: 2019-07-03 | End: 2019-07-03 | Stop reason: SURG

## 2019-07-03 RX ORDER — ROSUVASTATIN CALCIUM 10 MG/1
10 TABLET, COATED ORAL 3 TIMES WEEKLY
Status: DISCONTINUED | OUTPATIENT
Start: 2019-07-03 | End: 2019-07-04 | Stop reason: HOSPADM

## 2019-07-03 RX ORDER — LEVOTHYROXINE SODIUM 0.05 MG/1
50 TABLET ORAL DAILY
Status: DISCONTINUED | OUTPATIENT
Start: 2019-07-04 | End: 2019-07-04 | Stop reason: HOSPADM

## 2019-07-03 RX ORDER — PROPOFOL 10 MG/ML
VIAL (ML) INTRAVENOUS AS NEEDED
Status: DISCONTINUED | OUTPATIENT
Start: 2019-07-03 | End: 2019-07-03 | Stop reason: SURG

## 2019-07-03 RX ORDER — HYDROMORPHONE HYDROCHLORIDE 1 MG/ML
0.5 INJECTION, SOLUTION INTRAMUSCULAR; INTRAVENOUS; SUBCUTANEOUS
Status: DISCONTINUED | OUTPATIENT
Start: 2019-07-03 | End: 2019-07-03 | Stop reason: HOSPADM

## 2019-07-03 RX ORDER — DIPHENHYDRAMINE HYDROCHLORIDE 50 MG/ML
12.5 INJECTION INTRAMUSCULAR; INTRAVENOUS
Status: DISCONTINUED | OUTPATIENT
Start: 2019-07-03 | End: 2019-07-03 | Stop reason: HOSPADM

## 2019-07-03 RX ORDER — SODIUM CHLORIDE, SODIUM LACTATE, POTASSIUM CHLORIDE, CALCIUM CHLORIDE 600; 310; 30; 20 MG/100ML; MG/100ML; MG/100ML; MG/100ML
9 INJECTION, SOLUTION INTRAVENOUS CONTINUOUS
Status: DISCONTINUED | OUTPATIENT
Start: 2019-07-03 | End: 2019-07-03

## 2019-07-03 RX ORDER — EPHEDRINE SULFATE 50 MG/ML
INJECTION, SOLUTION INTRAVENOUS AS NEEDED
Status: DISCONTINUED | OUTPATIENT
Start: 2019-07-03 | End: 2019-07-03 | Stop reason: SURG

## 2019-07-03 RX ORDER — HYDRALAZINE HYDROCHLORIDE 20 MG/ML
10 INJECTION INTRAMUSCULAR; INTRAVENOUS EVERY 4 HOURS PRN
Status: DISCONTINUED | OUTPATIENT
Start: 2019-07-03 | End: 2019-07-04 | Stop reason: HOSPADM

## 2019-07-03 RX ORDER — ACETAMINOPHEN 325 MG/1
650 TABLET ORAL EVERY 6 HOURS PRN
Status: DISCONTINUED | OUTPATIENT
Start: 2019-07-03 | End: 2019-07-04 | Stop reason: HOSPADM

## 2019-07-03 RX ORDER — HYDROMORPHONE HCL 110MG/55ML
PATIENT CONTROLLED ANALGESIA SYRINGE INTRAVENOUS AS NEEDED
Status: DISCONTINUED | OUTPATIENT
Start: 2019-07-03 | End: 2019-07-03 | Stop reason: SURG

## 2019-07-03 RX ORDER — HYDRALAZINE HYDROCHLORIDE 20 MG/ML
INJECTION INTRAMUSCULAR; INTRAVENOUS AS NEEDED
Status: DISCONTINUED | OUTPATIENT
Start: 2019-07-03 | End: 2019-07-03 | Stop reason: SURG

## 2019-07-03 RX ORDER — PROTAMINE SULFATE 10 MG/ML
INJECTION, SOLUTION INTRAVENOUS AS NEEDED
Status: DISCONTINUED | OUTPATIENT
Start: 2019-07-03 | End: 2019-07-03 | Stop reason: SURG

## 2019-07-03 RX ORDER — NALOXONE HCL 0.4 MG/ML
0.2 VIAL (ML) INJECTION AS NEEDED
Status: DISCONTINUED | OUTPATIENT
Start: 2019-07-03 | End: 2019-07-03 | Stop reason: HOSPADM

## 2019-07-03 RX ORDER — MIDAZOLAM HYDROCHLORIDE 1 MG/ML
1 INJECTION INTRAMUSCULAR; INTRAVENOUS
Status: DISCONTINUED | OUTPATIENT
Start: 2019-07-03 | End: 2019-07-03 | Stop reason: HOSPADM

## 2019-07-03 RX ORDER — CEFAZOLIN SODIUM 2 G/100ML
2 INJECTION, SOLUTION INTRAVENOUS EVERY 8 HOURS
Status: COMPLETED | OUTPATIENT
Start: 2019-07-03 | End: 2019-07-04

## 2019-07-03 RX ORDER — FLUMAZENIL 0.1 MG/ML
0.2 INJECTION INTRAVENOUS AS NEEDED
Status: DISCONTINUED | OUTPATIENT
Start: 2019-07-03 | End: 2019-07-03 | Stop reason: HOSPADM

## 2019-07-03 RX ORDER — METOPROLOL SUCCINATE 25 MG/1
25 TABLET, EXTENDED RELEASE ORAL EVERY MORNING
Status: DISCONTINUED | OUTPATIENT
Start: 2019-07-04 | End: 2019-07-04

## 2019-07-03 RX ORDER — LIDOCAINE HYDROCHLORIDE 10 MG/ML
INJECTION, SOLUTION EPIDURAL; INFILTRATION; INTRACAUDAL; PERINEURAL AS NEEDED
Status: DISCONTINUED | OUTPATIENT
Start: 2019-07-03 | End: 2019-07-03 | Stop reason: HOSPADM

## 2019-07-03 RX ORDER — ACETAMINOPHEN 325 MG/1
650 TABLET ORAL ONCE AS NEEDED
Status: DISCONTINUED | OUTPATIENT
Start: 2019-07-03 | End: 2019-07-03 | Stop reason: HOSPADM

## 2019-07-03 RX ORDER — HEPARIN SODIUM 1000 [USP'U]/ML
INJECTION, SOLUTION INTRAVENOUS; SUBCUTANEOUS AS NEEDED
Status: DISCONTINUED | OUTPATIENT
Start: 2019-07-03 | End: 2019-07-03 | Stop reason: SURG

## 2019-07-03 RX ORDER — LABETALOL HYDROCHLORIDE 5 MG/ML
5 INJECTION, SOLUTION INTRAVENOUS
Status: DISCONTINUED | OUTPATIENT
Start: 2019-07-03 | End: 2019-07-03 | Stop reason: HOSPADM

## 2019-07-03 RX ORDER — ONDANSETRON 2 MG/ML
4 INJECTION INTRAMUSCULAR; INTRAVENOUS ONCE AS NEEDED
Status: DISCONTINUED | OUTPATIENT
Start: 2019-07-03 | End: 2019-07-03 | Stop reason: HOSPADM

## 2019-07-03 RX ORDER — EPHEDRINE SULFATE 50 MG/ML
5 INJECTION, SOLUTION INTRAVENOUS ONCE AS NEEDED
Status: DISCONTINUED | OUTPATIENT
Start: 2019-07-03 | End: 2019-07-03 | Stop reason: HOSPADM

## 2019-07-03 RX ORDER — LIDOCAINE HYDROCHLORIDE 10 MG/ML
0.5 INJECTION, SOLUTION EPIDURAL; INFILTRATION; INTRACAUDAL; PERINEURAL ONCE AS NEEDED
Status: DISCONTINUED | OUTPATIENT
Start: 2019-07-03 | End: 2019-07-03 | Stop reason: HOSPADM

## 2019-07-03 RX ORDER — IPRATROPIUM BROMIDE 21 UG/1
1 SPRAY, METERED NASAL EVERY 12 HOURS
Status: DISCONTINUED | OUTPATIENT
Start: 2019-07-04 | End: 2019-07-04

## 2019-07-03 RX ORDER — PROMETHAZINE HYDROCHLORIDE 25 MG/ML
6.25 INJECTION, SOLUTION INTRAMUSCULAR; INTRAVENOUS
Status: DISCONTINUED | OUTPATIENT
Start: 2019-07-03 | End: 2019-07-03 | Stop reason: HOSPADM

## 2019-07-03 RX ORDER — HYDROMORPHONE HYDROCHLORIDE 1 MG/ML
0.5 INJECTION, SOLUTION INTRAMUSCULAR; INTRAVENOUS; SUBCUTANEOUS
Status: DISCONTINUED | OUTPATIENT
Start: 2019-07-03 | End: 2019-07-04 | Stop reason: HOSPADM

## 2019-07-03 RX ORDER — CEFAZOLIN SODIUM 2 G/100ML
2 INJECTION, SOLUTION INTRAVENOUS ONCE
Status: COMPLETED | OUTPATIENT
Start: 2019-07-03 | End: 2019-07-03

## 2019-07-03 RX ORDER — HYDROCODONE BITARTRATE AND ACETAMINOPHEN 5; 325 MG/1; MG/1
1 TABLET ORAL EVERY 4 HOURS PRN
Status: DISCONTINUED | OUTPATIENT
Start: 2019-07-03 | End: 2019-07-04 | Stop reason: HOSPADM

## 2019-07-03 RX ORDER — DEXAMETHASONE SODIUM PHOSPHATE 10 MG/ML
INJECTION INTRAMUSCULAR; INTRAVENOUS AS NEEDED
Status: DISCONTINUED | OUTPATIENT
Start: 2019-07-03 | End: 2019-07-03 | Stop reason: SURG

## 2019-07-03 RX ORDER — LIDOCAINE HYDROCHLORIDE 20 MG/ML
INJECTION, SOLUTION INFILTRATION; PERINEURAL AS NEEDED
Status: DISCONTINUED | OUTPATIENT
Start: 2019-07-03 | End: 2019-07-03 | Stop reason: SURG

## 2019-07-03 RX ORDER — PROMETHAZINE HYDROCHLORIDE 25 MG/1
25 SUPPOSITORY RECTAL ONCE AS NEEDED
Status: DISCONTINUED | OUTPATIENT
Start: 2019-07-03 | End: 2019-07-03 | Stop reason: HOSPADM

## 2019-07-03 RX ORDER — PROMETHAZINE HYDROCHLORIDE 25 MG/1
25 TABLET ORAL ONCE AS NEEDED
Status: DISCONTINUED | OUTPATIENT
Start: 2019-07-03 | End: 2019-07-03 | Stop reason: HOSPADM

## 2019-07-03 RX ADMIN — SODIUM CHLORIDE, POTASSIUM CHLORIDE, SODIUM LACTATE AND CALCIUM CHLORIDE 9 ML/HR: 600; 310; 30; 20 INJECTION, SOLUTION INTRAVENOUS at 09:49

## 2019-07-03 RX ADMIN — PROTAMINE SULFATE 50 MG: 10 INJECTION, SOLUTION INTRAVENOUS at 13:15

## 2019-07-03 RX ADMIN — PHENYLEPHRINE HYDROCHLORIDE 100 MCG: 10 INJECTION INTRAVENOUS at 11:18

## 2019-07-03 RX ADMIN — FENTANYL CITRATE 50 MCG: 50 INJECTION, SOLUTION INTRAMUSCULAR; INTRAVENOUS at 10:28

## 2019-07-03 RX ADMIN — NITROGLYCERIN 20 MCG: 5 INJECTION, SOLUTION INTRAVENOUS at 13:27

## 2019-07-03 RX ADMIN — CEFAZOLIN SODIUM 2 G: 2 INJECTION, SOLUTION INTRAVENOUS at 11:22

## 2019-07-03 RX ADMIN — FENTANYL CITRATE 50 MCG: 50 INJECTION INTRAMUSCULAR; INTRAVENOUS at 14:34

## 2019-07-03 RX ADMIN — EPHEDRINE SULFATE 10 MG: 50 INJECTION INTRAMUSCULAR; INTRAVENOUS; SUBCUTANEOUS at 11:57

## 2019-07-03 RX ADMIN — ACETAMINOPHEN 650 MG: 325 TABLET, FILM COATED ORAL at 20:15

## 2019-07-03 RX ADMIN — PHENYLEPHRINE HYDROCHLORIDE 0.4 MCG/KG/MIN: 10 INJECTION, SOLUTION INTRAMUSCULAR; INTRAVENOUS; SUBCUTANEOUS at 12:09

## 2019-07-03 RX ADMIN — FAMOTIDINE 20 MG: 10 INJECTION INTRAVENOUS at 09:49

## 2019-07-03 RX ADMIN — HYDRALAZINE HYDROCHLORIDE 5 MG: 20 INJECTION INTRAMUSCULAR; INTRAVENOUS at 13:50

## 2019-07-03 RX ADMIN — EPHEDRINE SULFATE 10 MG: 50 INJECTION INTRAMUSCULAR; INTRAVENOUS; SUBCUTANEOUS at 11:27

## 2019-07-03 RX ADMIN — HEPARIN SODIUM 5000 UNITS: 1000 INJECTION, SOLUTION INTRAVENOUS; SUBCUTANEOUS at 12:32

## 2019-07-03 RX ADMIN — HYDROMORPHONE HYDROCHLORIDE 0.25 MG: 2 INJECTION INTRAMUSCULAR; INTRAVENOUS; SUBCUTANEOUS at 11:38

## 2019-07-03 RX ADMIN — SODIUM CHLORIDE, POTASSIUM CHLORIDE, SODIUM LACTATE AND CALCIUM CHLORIDE: 600; 310; 30; 20 INJECTION, SOLUTION INTRAVENOUS at 13:24

## 2019-07-03 RX ADMIN — FENTANYL CITRATE 175 MCG: 50 INJECTION, SOLUTION INTRAMUSCULAR; INTRAVENOUS at 11:17

## 2019-07-03 RX ADMIN — LIDOCAINE HYDROCHLORIDE 80 MG: 20 INJECTION, SOLUTION INFILTRATION; PERINEURAL at 11:17

## 2019-07-03 RX ADMIN — PANTOPRAZOLE SODIUM 40 MG: 40 TABLET, DELAYED RELEASE ORAL at 20:07

## 2019-07-03 RX ADMIN — PROPOFOL 170 MG: 10 INJECTION, EMULSION INTRAVENOUS at 11:17

## 2019-07-03 RX ADMIN — SUGAMMADEX 200 MG: 100 INJECTION, SOLUTION INTRAVENOUS at 13:24

## 2019-07-03 RX ADMIN — PROPOFOL 20 MG: 10 INJECTION, EMULSION INTRAVENOUS at 11:38

## 2019-07-03 RX ADMIN — HYDRALAZINE HYDROCHLORIDE 5 MG: 20 INJECTION INTRAMUSCULAR; INTRAVENOUS at 13:25

## 2019-07-03 RX ADMIN — FENTANYL CITRATE 50 MCG: 50 INJECTION INTRAMUSCULAR; INTRAVENOUS at 14:11

## 2019-07-03 RX ADMIN — FENTANYL CITRATE 75 MCG: 50 INJECTION, SOLUTION INTRAMUSCULAR; INTRAVENOUS at 11:38

## 2019-07-03 RX ADMIN — DEXAMETHASONE SODIUM PHOSPHATE 8 MG: 10 INJECTION INTRAMUSCULAR; INTRAVENOUS at 11:39

## 2019-07-03 RX ADMIN — HEPARIN SODIUM 2500 UNITS: 1000 INJECTION, SOLUTION INTRAVENOUS; SUBCUTANEOUS at 12:19

## 2019-07-03 RX ADMIN — MIDAZOLAM HYDROCHLORIDE 1 MG: 1 INJECTION, SOLUTION INTRAMUSCULAR; INTRAVENOUS at 10:30

## 2019-07-03 RX ADMIN — ROSUVASTATIN CALCIUM 10 MG: 10 TABLET, FILM COATED ORAL at 18:03

## 2019-07-03 RX ADMIN — HEPARIN SODIUM 7500 UNITS: 1000 INJECTION, SOLUTION INTRAVENOUS; SUBCUTANEOUS at 12:08

## 2019-07-03 RX ADMIN — ONDANSETRON 4 MG: 2 INJECTION INTRAMUSCULAR; INTRAVENOUS at 13:26

## 2019-07-03 RX ADMIN — CEFAZOLIN SODIUM 2 G: 2 INJECTION, SOLUTION INTRAVENOUS at 18:03

## 2019-07-03 RX ADMIN — ROCURONIUM BROMIDE 50 MG: 10 INJECTION INTRAVENOUS at 11:17

## 2019-07-03 RX ADMIN — MIDAZOLAM HYDROCHLORIDE 1 MG: 1 INJECTION, SOLUTION INTRAMUSCULAR; INTRAVENOUS at 10:28

## 2019-07-03 RX ADMIN — HYDRALAZINE HYDROCHLORIDE 5 MG: 20 INJECTION INTRAMUSCULAR; INTRAVENOUS at 13:29

## 2019-07-03 RX ADMIN — SODIUM CHLORIDE, POTASSIUM CHLORIDE, SODIUM LACTATE AND CALCIUM CHLORIDE 50 ML/HR: 600; 310; 30; 20 INJECTION, SOLUTION INTRAVENOUS at 17:02

## 2019-07-03 NOTE — NURSING NOTE
Neuro assessment with in normal: bilateral hand  strong and equal, tongue midline speech is normal and able to wiggle both toes .

## 2019-07-03 NOTE — ANESTHESIA POSTPROCEDURE EVALUATION
Patient: Lucas Maynard    Procedure Summary     Date:  07/03/19 Room / Location:  [facility] OR  / [facility] MAIN OR    Anesthesia Start:  1103 Anesthesia Stop:  1352    Procedure:  left CAROTID ENDARTERECTOMY (Left Neck) Diagnosis:  Asymptomatic stenosis of left carotid artery    Surgeon:  Javier Acevedo MD Provider:  Abel Lee MD    Anesthesia Type:  general ASA Status:  3          Anesthesia Type: general  Last vitals  BP   138/81 (07/03/19 1405)   Temp   36.4 °C (97.6 °F) (07/03/19 1347)   Pulse   65 (07/03/19 1405)   Resp   16 (07/03/19 1405)     SpO2   98 % (07/03/19 1405)     Post Anesthesia Care and Evaluation    Patient location during evaluation: PACU  Patient participation: complete - patient participated  Level of consciousness: awake and alert  Pain management: adequate  Airway patency: patent  Anesthetic complications: No anesthetic complications    Cardiovascular status: acceptable  Respiratory status: acceptable  Hydration status: acceptable    Comments: --------------------            07/03/19               1405     --------------------   BP:       138/81     Pulse:      65       Resp:       16       Temp:                SpO2:      98%      --------------------

## 2019-07-03 NOTE — PROGRESS NOTES
Patient alert and doing well after left carotid endarterectomy.  Discussed with patient and his daughter operative findings.  Blood pressure controlled and no cardiopulmonary symptoms.  Will discontinue arterial line tonight with plans for discharge tomorrow if stable night.

## 2019-07-03 NOTE — H&P
Patient Care Team:  Madison Aparicio MD as PCP - General  Madison Aparicio MD as PCP - Family Medicine  Madison Aparicio MD as PCP - Claims Attributed  Ted Clemens MD as Consulting Physician (Urology)  Jean Pierre Kelsey MD as Consulting Physician (Cardiology)    [facility]    Chief complaint severe asymptomatic left carotid stenosis    Subjective     Patient is a 74 y.o. male presents with severe left carotid stenosis.  For left carotid endarterectomy.  He did well after right carotid endarterectomy.     Patient preop chest x-ray with possible finding ampulla but radiologist felt may be outside of patient and will repeat PA chest x-ray per radiologist recommendations.    Review of Systems   Pertinent items are noted in HPI, all other systems reviewed and negative    Past Medical History:   Diagnosis Date   • Atrial fibrillation (CMS/HCC)    • CAD (coronary artery disease)    • Cancer (CMS/HCC)     prostate biopsy only no radiation   • Cardiomyopathy (CMS/HCC)    • Carotid stenosis    • DDD (degenerative disc disease), lumbar    • Disease of thyroid gland    • GERD (gastroesophageal reflux disease)    • Hyperlipidemia    • Murmur, heart      Past Surgical History:   Procedure Laterality Date   • CAROTID ENDARTERECTOMY Right 12/20/2018    Procedure: RIGHT CAROTID ENDARTERECTOMY;  Surgeon: Javier Acevedo MD;  Location: [facility] MAIN OR;  Service: Vascular   • COLONOSCOPY N/A 7/27/2016    Procedure: COLONOSCOPY to terminal ileum with polypectomy;  Surgeon: Hi Quintanilla MD;  Location: [facility] ENDOSCOPY;  Service:    • CYST REMOVAL Right     ANKLE   • ENDOSCOPY     • PROSTATE BIOPSY      dx 2007 cancer no surgery or radiation   • TONSILLECTOMY       Family History   Problem Relation Age of Onset   • Malig Hyperthermia Neg Hx      Social History     Tobacco Use   • Smoking status: Never Smoker   • Smokeless tobacco: Never Used   Substance Use Topics   • Alcohol use: Yes     Alcohol/week: 6.0 oz      Types: 5 Glasses of wine, 5 Cans of beer per week   • Drug use: No     Medications Prior to Admission   Medication Sig Dispense Refill Last Dose   • aspirin 81 MG EC tablet Take 81 mg by mouth Daily. Per Dr Acevedo to cont to take but not day of surgery      • ipratropium (ATROVENT) 0.03 % nasal spray 2 sprays into the nostril(s) as directed by provider Every 12 (Twelve) Hours.   12/20/2018 at 0630   • levothyroxine (SYNTHROID, LEVOTHROID) 50 MCG tablet Take 50 mcg by mouth Daily.   12/20/2018 at 0630   • LYCOPENE PO Take 80 mg by mouth Daily.      • metoprolol succinate XL (TOPROL-XL) 25 MG 24 hr tablet Take 25 mg by mouth Every Morning.   12/20/2018 at 0630   • Multiple Vitamin (MULTI-VITAMIN DAILY PO) Take 1 tablet by mouth Daily.   6/25/2019   • pantoprazole (PROTONIX) 40 MG EC tablet Take 40 mg by mouth Every Night.      • Probiotic Product (PROBIOTIC ADVANCED PO) Take 2 capsules by mouth Daily.   12/18/2018 at 1200   • rosuvastatin (CRESTOR) 20 MG tablet Take 10 mg by mouth 3 (Three) Times a Week.   12/18/2018 at 1800   • sildenafil (REVATIO) 20 MG tablet Take 20 mg by mouth As Needed.   6/24/2019     Allergies:  Avelox [moxifloxacin hcl]    Objective     Vital Signs            Physical Exam:      General Appearance:    Alert, cooperative, in no acute distress   Head:    Normocephalic, without obvious abnormality, atraumatic   Eyes:            Lids and lashes normal, conjunctivae and sclerae normal, no   icterus, no pallor, corneas clear, PERRLA   Ears:    Ears appear intact with no abnormalities noted   Throat:   No oral lesions, no thrush, oral mucosa moist   Neck:   No adenopathy, supple, trachea midline, no thyromegaly, no   carotid bruit, no JVD   Back:     No kyphosis present, no scoliosis present, no skin lesions,      erythema or scars, no tenderness to percussion or                   palpation,   range of motion normal   Lungs:     Clear to auscultation,respirations regular, even and                   unlabored    Heart:    Regular rhythm and normal rate, normal S1 and S2, no            murmur, no gallop, no rub, no click   Chest Wall:    No abnormalities observed   Abdomen:     Normal bowel sounds, no masses, no organomegaly, soft        non-tender, non-distended, no guarding, no rebound                tenderness   Rectal:     Deferred   Extremities:   Moves all extremities well, no edema, no cyanosis, no             redness   Pulses:   Pulses palpable and equal bilaterally   Skin:   No bleeding, bruising or rash   Lymph nodes:   No palpable adenopathy   Neurologic:   Cranial nerves 2 - 12 grossly intact, sensation intact, DTR       present and equal bilaterally     Results Review:    I reviewed the patient's new clinical results.    Assessment/Plan       Carotid stenosis, asymptomatic, left      Left carotid endarterectomy    I discussed the patients findings and my recommendations with patient.     Javier Acevedo MD  07/03/19  7:35 AM    Time: 20 minutes     Repeat chest xray normal.

## 2019-07-03 NOTE — ANESTHESIA PROCEDURE NOTES
Arterial Line    Pre-sedation assessment completed: 7/3/2019 9:42 AM    Patient reassessed immediately prior to procedure    Patient location during procedure: holding area  Start time: 7/3/2019 9:42 AM  Stop Time:7/3/2019 9:52 AM       Line placed for hemodynamic monitoring, ABGs/Labs/ISTAT and MD/Surgeon request.  Performed By   Anesthesiologist: Ted Bailon MD  Preanesthetic Checklist  Completed: patient identified, surgical consent, pre-op evaluation, timeout performed, IV checked, risks and benefits discussed and monitors and equipment checked  Arterial Line Prep   Sterile Tech: cap, gloves, gown, mask and sterile barriers  Prep: ChloraPrep  Patient monitoring: blood pressure monitoring, continuous pulse oximetry and EKG  Arterial Line Procedure   Laterality:right  Location:  radial artery  Catheter size: 20 G   Guidance: ultrasound guided  PROCEDURE NOTE/ULTRASOUND INTERPRETATION.  Using ultrasound guidance the potential vascular sites for insertion of the catheter were visualized to determine the patency of the vessel to be used for vascular access.  After selecting the appropriate site for insertion, the needle was visualized under ultrasound being inserted into the radial artery, followed by ultrasound confirmation of wire and catheter placement. There were no abnormalities seen on ultrasound; an image was taken; and the patient tolerated the procedure with no complications.   Number of attempts: 1  Successful placement: yes          Post Assessment   Dressing Type: occlusive dressing applied, secured with tape and wrist guard applied.   Complications no  Circ/Move/Sens Assessment: normal.   Patient Tolerance: patient tolerated the procedure well with no apparent complications

## 2019-07-03 NOTE — ANESTHESIA PROCEDURE NOTES
Airway  Urgency: elective    Date/Time: 7/3/2019 11:29 AM  End Time:7/3/2019 11:29 AM  Airway not difficult    General Information and Staff    Patient location during procedure: OR  Anesthesiologist: Abel Lee MD  CRNA: Dotty Lomas CRNA    Indications and Patient Condition  Indications for airway management: airway protection    Preoxygenated: yes  MILS maintained throughout  Mask difficulty assessment: 1 - vent by mask    Final Airway Details  Final airway type: endotracheal airway      Successful airway: ETT  Cuffed: yes   Successful intubation technique: direct laryngoscopy  Endotracheal tube insertion site: oral  Blade: De La Paz  Blade size: 2  ETT size (mm): 7.0  Cormack-Lehane Classification: grade I - full view of glottis  Placement verified by: chest auscultation and capnometry   Measured from: lips  Number of attempts at approach: 1    Additional Comments  Atraumatic, Secured after verification of placement

## 2019-07-03 NOTE — OP NOTE
[facility]     Lucas Maynard  Admission date: 7/3/2019  Date of operation: 7/3/2019    Pre-op Diagnosis:      * Asymptomatic stenosis of left carotid artery [I65.22]    Post-Op Diagnosis Codes:     * Asymptomatic stenosis of left carotid artery [I65.22]    Procedure performed: Left carotid endarterectomy, duplex scan intraoperative    Surgeon: Dr. Javier Acevedo    Assistants:  Shannon LUIS , provided critical assistance in exposure, retraction, and suctioning that overall decrease blood loss and operative time.    Anesthesia: General    Staff:   Circulator: Johanna Birch RN; Linsey Cortes RN  Scrub Person: Carmen Salguero; Roro Hernández  Assistant: Shannon Nuñez CSA  Orientee: Brenna Banks    Indications: Pleasant gentleman with severe asymptomatic left carotid stenosis for endarterectomy.       Procedure Details Left neck prepped and draped in usual fashion.  Linear incision made through skin subcutaneous tissue and platysma.  Bleeders controlled electrocautery and hemoclips.  External jugular vein divided and ligated with silk ligatures.  Greater auricular nerve identified and protected.  Sternocleidomastoid mobilized laterally.  Crossing vessels controlled with hemoclips electrocautery and silk ties.  Carotid sheath identified.  Facial vein divided and ligated with silk suture ligatures.  Additional veins controlled with hemoclips and silk ties.  The common carotid artery, internal carotid artery, and external carotid artery circumferentially dissected from the omohyoid to digastric muscle.  Vagus nerves and hypoglossal nerves were both identified and protected throughout case.  Patient heparinized with 7,500 units of heparin.  Additional heparin given throughout the case to maintain ACT twice normal.  Blood pressure kept over 140 systolic during clamping.  Internal, common, and external carotid artery clamped sequentially.  Longitudinal arteriotomy made  in the common carotid artery and extended into the internal carotid artery above level plaquing.  Backbleeding was checked and was pulsatile. Endarterectomy performed of the common and internal carotid artery direct vision.  Eversion endarterectomy performed of the external carotid artery.  Adequate endpoints were achieved.  All loose plaque removed.  Endarterectomy site irrigated with heparin saline.  Vascu-Guard patch angioplasty closure of the endarterectomy site was then performed running 6-0 Prolene suture without problems. Intraoperative duplex scan was normal.  Heparin reversed with 40 mg of protamine.  Hemostasis achieved.  Irrigated with antibiotic solution.  Counts correct.  Wounds closed in layers with 3-0 Vicryl for the sternocleidomastoid, 4-0 Vicryl for the platysma, and subcuticular Vicryl stitch for the skin.  Dermabond applied.  Patient awoke neurologically intact.  Patient taken to recovery room in stable condition.      Radiographic interpretation: not applicable    Findings: severe stenosis with soft plaque    Estimated Blood Loss: 100ml    Specimens: * No orders in the log *      Drains:      Complications: none    Condition: stable    Disposition: to recovery room    Javier Acevedo MD     Date: 7/3/2019  Time: 1:46 PM    Active Hospital Problems    Diagnosis  POA   • Carotid stenosis, asymptomatic, left [I65.22]  Yes      Resolved Hospital Problems   No resolved problems to display.

## 2019-07-03 NOTE — ANESTHESIA PREPROCEDURE EVALUATION
Anesthesia Evaluation     Patient summary reviewed and Nursing notes reviewed                Airway   Mallampati: II  No difficulty expected  Dental      Pulmonary - negative pulmonary ROS   Cardiovascular     Rhythm: irregular  Rate: normal    (+) valvular problems/murmurs murmur, CAD, dysrhythmias Atrial Fib, Paroxysmal Atrial Fib, CHF, PVD, hyperlipidemia,  carotid artery disease      Neuro/Psych- negative ROS  GI/Hepatic/Renal/Endo    (+)  GERD,  hypothyroidism,     Musculoskeletal     Abdominal    Substance History - negative use     OB/GYN negative ob/gyn ROS         Other   (+) arthritis   history of cancer                    Anesthesia Plan    ASA 3     general   (Right art line radial)  intravenous induction   Anesthetic plan, all risks, benefits, and alternatives have been provided, discussed and informed consent has been obtained with: patient.

## 2019-07-03 NOTE — BRIEF OP NOTE
Urgency:  Elective    Anesthesia:  General    Type of endarterectomy performed:  Conventional    Side:  Left    Patch Type:  Prosthetic - Synovis ( Vascu-Guard Pericardial Bovine)    Shunt:  No    Skin Prep:  Chlorhexidine with alcohol (ChloroPrep)    Drain:  No    Heparin:  Yes    Protamine:  Yes    Dextran:  No     Re-exploration after closure:  No    Completion Duplex:  Yes    Monitoring:  EEG, No and Stump pressures, No

## 2019-07-04 VITALS
BODY MASS INDEX: 28.14 KG/M2 | HEART RATE: 78 BPM | DIASTOLIC BLOOD PRESSURE: 79 MMHG | SYSTOLIC BLOOD PRESSURE: 142 MMHG | HEIGHT: 77 IN | RESPIRATION RATE: 16 BRPM | WEIGHT: 238.31 LBS | OXYGEN SATURATION: 96 % | TEMPERATURE: 97.8 F

## 2019-07-04 LAB
ANION GAP SERPL CALCULATED.3IONS-SCNC: 12.6 MMOL/L (ref 5–15)
APTT PPP: 26.6 SECONDS (ref 22.7–35.4)
BASOPHILS # BLD AUTO: 0.03 10*3/MM3 (ref 0–0.2)
BASOPHILS NFR BLD AUTO: 0.4 % (ref 0–1.5)
BUN BLD-MCNC: 17 MG/DL (ref 8–23)
BUN/CREAT SERPL: 13.7 (ref 7–25)
CALCIUM SPEC-SCNC: 8.7 MG/DL (ref 8.6–10.5)
CHLORIDE SERPL-SCNC: 104 MMOL/L (ref 98–107)
CO2 SERPL-SCNC: 24.4 MMOL/L (ref 22–29)
CREAT BLD-MCNC: 1.24 MG/DL (ref 0.76–1.27)
DEPRECATED RDW RBC AUTO: 45.3 FL (ref 37–54)
EOSINOPHIL # BLD AUTO: 0 10*3/MM3 (ref 0–0.4)
EOSINOPHIL NFR BLD AUTO: 0 % (ref 0.3–6.2)
ERYTHROCYTE [DISTWIDTH] IN BLOOD BY AUTOMATED COUNT: 13.2 % (ref 12.3–15.4)
GFR SERPL CREATININE-BSD FRML MDRD: 57 ML/MIN/1.73
GLUCOSE BLD-MCNC: 156 MG/DL (ref 65–99)
HCT VFR BLD AUTO: 37.5 % (ref 37.5–51)
HGB BLD-MCNC: 12.1 G/DL (ref 13–17.7)
IMM GRANULOCYTES # BLD AUTO: 0.02 10*3/MM3 (ref 0–0.05)
IMM GRANULOCYTES NFR BLD AUTO: 0.3 % (ref 0–0.5)
INR PPP: 1.08 (ref 0.9–1.1)
LYMPHOCYTES # BLD AUTO: 1.02 10*3/MM3 (ref 0.7–3.1)
LYMPHOCYTES NFR BLD AUTO: 14.9 % (ref 19.6–45.3)
MCH RBC QN AUTO: 29.8 PG (ref 26.6–33)
MCHC RBC AUTO-ENTMCNC: 32.3 G/DL (ref 31.5–35.7)
MCV RBC AUTO: 92.4 FL (ref 79–97)
MONOCYTES # BLD AUTO: 0.32 10*3/MM3 (ref 0.1–0.9)
MONOCYTES NFR BLD AUTO: 4.7 % (ref 5–12)
NEUTROPHILS # BLD AUTO: 5.44 10*3/MM3 (ref 1.7–7)
NEUTROPHILS NFR BLD AUTO: 79.7 % (ref 42.7–76)
NRBC BLD AUTO-RTO: 0 /100 WBC (ref 0–0.2)
PLATELET # BLD AUTO: 199 10*3/MM3 (ref 140–450)
PMV BLD AUTO: 10.8 FL (ref 6–12)
POTASSIUM BLD-SCNC: 4.3 MMOL/L (ref 3.5–5.2)
PROTHROMBIN TIME: 13.7 SECONDS (ref 11.7–14.2)
RBC # BLD AUTO: 4.06 10*6/MM3 (ref 4.14–5.8)
SODIUM BLD-SCNC: 141 MMOL/L (ref 136–145)
WBC NRBC COR # BLD: 6.83 10*3/MM3 (ref 3.4–10.8)

## 2019-07-04 PROCEDURE — 85025 COMPLETE CBC W/AUTO DIFF WBC: CPT | Performed by: SURGERY

## 2019-07-04 PROCEDURE — 25010000002 ENOXAPARIN PER 10 MG: Performed by: SURGERY

## 2019-07-04 PROCEDURE — 80048 BASIC METABOLIC PNL TOTAL CA: CPT | Performed by: SURGERY

## 2019-07-04 PROCEDURE — 85730 THROMBOPLASTIN TIME PARTIAL: CPT | Performed by: SURGERY

## 2019-07-04 PROCEDURE — 85610 PROTHROMBIN TIME: CPT | Performed by: SURGERY

## 2019-07-04 PROCEDURE — 25010000003 CEFAZOLIN IN DEXTROSE 2-4 GM/100ML-% SOLUTION: Performed by: SURGERY

## 2019-07-04 RX ORDER — METOPROLOL SUCCINATE 25 MG/1
25 TABLET, EXTENDED RELEASE ORAL DAILY
Status: DISCONTINUED | OUTPATIENT
Start: 2019-07-04 | End: 2019-07-04 | Stop reason: HOSPADM

## 2019-07-04 RX ORDER — IPRATROPIUM BROMIDE 21 UG/1
1 SPRAY, METERED NASAL EVERY 12 HOURS
Status: DISCONTINUED | OUTPATIENT
Start: 2019-07-04 | End: 2019-07-04 | Stop reason: HOSPADM

## 2019-07-04 RX ADMIN — CEFAZOLIN SODIUM 2 G: 2 INJECTION, SOLUTION INTRAVENOUS at 03:24

## 2019-07-04 RX ADMIN — ACETAMINOPHEN 650 MG: 325 TABLET, FILM COATED ORAL at 03:26

## 2019-07-04 RX ADMIN — METOPROLOL SUCCINATE 25 MG: 25 TABLET, FILM COATED, EXTENDED RELEASE ORAL at 08:05

## 2019-07-04 RX ADMIN — ENOXAPARIN SODIUM 30 MG: 30 INJECTION SUBCUTANEOUS at 08:05

## 2019-07-04 RX ADMIN — ASPIRIN 81 MG: 81 TABLET, COATED ORAL at 08:05

## 2019-07-04 NOTE — PROGRESS NOTES
The patient is one day status post left carotid endarterectomy for asymptomatic critical stenosis.  He is doing satisfactorily with no significant complaints.  He has had no hemodynamic instability.  He is started on a diet which he has tolerated well.  He is been able to void adequately on his own.    Left neck incision is healing satisfactorily.  No hematoma present.  No erythema or cellulitis.  Tongue protrusion is midline.  Neurologic examination is normal.    He is doing well following his carotid endarterectomy.  He will be discharged home today.  Instructions have been given.  Prescription written.

## 2019-07-04 NOTE — PLAN OF CARE
Problem: Patient Care Overview  Goal: Plan of Care Review  Outcome: Ongoing (interventions implemented as appropriate)   07/04/19 1012   Coping/Psychosocial   Plan of Care Reviewed With patient   Plan of Care Review   Progress improving   OTHER   Outcome Summary VSS, no complaints of pain, will be discharged today, will continue to monitor       Problem: Pain, Acute (Adult)  Goal: Acceptable Pain Control/Comfort Level  Outcome: Ongoing (interventions implemented as appropriate)   07/04/19 1012   Pain, Acute (Adult)   Acceptable Pain Control/Comfort Level making progress toward outcome

## 2019-07-04 NOTE — PLAN OF CARE
Problem: Patient Care Overview  Goal: Plan of Care Review  Outcome: Ongoing (interventions implemented as appropriate)      Problem: Pain, Acute (Adult)  Goal: Acceptable Pain Control/Comfort Level  Outcome: Ongoing (interventions implemented as appropriate)      Problem: Pain, Chronic (Adult)  Goal: Identify Related Risk Factors and Signs and Symptoms  Outcome: Outcome(s) achieved Date Met: 07/04/19    Goal: Acceptable Pain/Comfort Level and Functional Ability  Outcome: Ongoing (interventions implemented as appropriate)      Problem: Carotid Endarterectomy (Adult)  Goal: Signs and Symptoms of Listed Potential Problems Will be Absent, Minimized or Managed (Carotid Endarterectomy)  Outcome: Ongoing (interventions implemented as appropriate)    Goal: Anesthesia/Sedation Recovery  Outcome: Ongoing (interventions implemented as appropriate)

## 2019-07-04 NOTE — DISCHARGE SUMMARY
"  Name: Lucas Maynard ADMIT: 7/3/2019   : 1945  PCP: Madison Aparicio MD    MRN: [id number] LOS: 1 days   AGE/SEX: 74 y.o. male  Location: [facility]     Date of Admission: 7/3/2019  Date of Discharge:  2019    PCP: Madison Aparicio MD      DISCHARGE DIAGNOSIS  Active Hospital Problems    Diagnosis  POA   • Carotid stenosis, asymptomatic, left [I65.22]  Yes      Resolved Hospital Problems   No resolved problems to display.       SECONDARY DIAGNOSES  Past Medical History:   Diagnosis Date   • Atrial fibrillation (CMS/HCC)    • CAD (coronary artery disease)    • Cancer (CMS/HCC)     prostate biopsy only no radiation   • Cardiomyopathy (CMS/HCC)    • Carotid stenosis    • DDD (degenerative disc disease), lumbar    • Disease of thyroid gland    • GERD (gastroesophageal reflux disease)    • Hyperlipidemia    • Murmur, heart        CONSULTS   Consults     No orders found for last 30 day(s).          PROCEDURES PERFORMED    Date: July 3, 2019, left carotid endarterectomy with patch angioplasty, intraoperative duplex ultrasound    HOSPITAL COURSE  Patient is a 74 y.o. male presented to [facility] admitted for asymptomatic critical stenosis of the left internal carotid artery.  Please see the admitting history and physical for further details.  Patient was admitted and taken to the operating room where he underwent the above-mentioned procedure.  His postoperative course following this was uneventful.  He maintained a normal neurologic examination and normal hemodynamic status.  He was started on a diet which he tolerated well.  He was able to void adequately without any difficulty.  He was therefore discharged home on his first postoperative day.      VITAL SIGNS  /79 (BP Location: Left arm, Patient Position: Lying)   Pulse 78   Temp 97.8 °F (36.6 °C) (Oral)   Resp 16   Ht 195.6 cm (77\")   Wt 108 kg (238 lb 5 oz)   SpO2 96%   BMI 28.26 kg/m²   Objective: Left neck incision is " healing satisfactorily with no hematoma or signs of infection.  Neurologic examination is normal.  Tongue protrusion is midline.  CONDITION ON DISCHARGE  Stable.      DISCHARGE DISPOSITION   Home or Self Care      DISCHARGE MEDICATIONS     Discharge Medications      Continue These Medications      Instructions Start Date   aspirin 81 MG EC tablet   81 mg, Oral, Daily, Per Dr Acevedo to cont to take but not day of surgery      ipratropium 0.03 % nasal spray  Commonly known as:  ATROVENT   2 sprays, Nasal, Every 12 Hours      levothyroxine 50 MCG tablet  Commonly known as:  SYNTHROID, LEVOTHROID   50 mcg, Oral, Daily      LYCOPENE PO   80 mg, Oral, Daily      metoprolol succinate XL 25 MG 24 hr tablet  Commonly known as:  TOPROL-XL   25 mg, Oral, Every Morning      MULTI-VITAMIN DAILY PO   1 tablet, Oral, Daily      pantoprazole 40 MG EC tablet  Commonly known as:  PROTONIX   40 mg, Oral, Nightly      PROBIOTIC ADVANCED PO   2 capsules, Oral, Daily      rosuvastatin 20 MG tablet  Commonly known as:  CRESTOR   10 mg, Oral, 3 Times Weekly      sildenafil 20 MG tablet  Commonly known as:  REVATIO   20 mg, Oral, As Needed            No future appointments.  Additional Instructions for the Follow-ups that You Need to Schedule     Discharge Follow-up with Specified Provider: Have patient call [phone] to arrange follow-up with Dr. Acevedo in 8 days   As directed      To:  Have patient call [phone] to arrange follow-up with Dr. Acevedo in 8 days           Follow-up Information     Javier Acevedo MD. Call in 1 week(s).    Specialty:  Vascular Surgery  Contact information:  [address] [facility] [address]  [place] [id number] [phone]             Madison Aparicio MD .    Specialty:  Internal Medicine  Contact information:  [address] [facility] [address]  [place] [id number] [phone]                   TEST  RESULTS PENDING AT DISCHARGE       [place], [facility]    Palestine Brown Ang Jr., MD  Office  Number [phone]    07/04/19  2:51 PM

## 2019-07-05 ENCOUNTER — READMISSION MANAGEMENT (OUTPATIENT)
Dept: CALL CENTER | Facility: HOSPITAL | Age: 74
End: 2019-07-05

## 2019-07-05 NOTE — OUTREACH NOTE
Prep Survey      Responses   Facility patient discharged from?  [facility]   Is patient eligible?  Yes   Discharge diagnosis  endarterectomy with patch angioplasty   Does the patient have one of the following disease processes/diagnoses(primary or secondary)?  General Surgery   Does the patient have Home health ordered?  No   Is there a DME ordered?  No   Prep survey completed?  Yes          Erica oLzano RN

## 2019-07-08 ENCOUNTER — READMISSION MANAGEMENT (OUTPATIENT)
Dept: CALL CENTER | Facility: HOSPITAL | Age: 74
End: 2019-07-08

## 2019-07-08 LAB
ACT BLD: 120 SECONDS (ref 82–152)
ACT BLD: 180 SECONDS (ref 82–152)
ACT BLD: 208 SECONDS (ref 82–152)
ACT BLD: 235 SECONDS (ref 82–152)
ACT BLD: 76 SECONDS (ref 82–152)

## 2019-07-08 NOTE — OUTREACH NOTE
General Surgery Week 1 Survey      Responses   Facility patient discharged from?  [facility]   Does the patient have one of the following disease processes/diagnoses(primary or secondary)?  General Surgery   Is there a successful TCM telephone encounter documented?  No   Week 1 attempt successful?  No   Unsuccessful attempts  Attempt 1          Jacky Casey RN

## 2019-07-11 ENCOUNTER — READMISSION MANAGEMENT (OUTPATIENT)
Dept: CALL CENTER | Facility: HOSPITAL | Age: 74
End: 2019-07-11

## 2019-07-11 NOTE — OUTREACH NOTE
General Surgery Week 1 Survey      Responses   Facility patient discharged from?  [place]   Does the patient have one of the following disease processes/diagnoses(primary or secondary)?  General Surgery   Is there a successful TCM telephone encounter documented?  No   Week 1 attempt successful?  No   Unsuccessful attempts  Attempt 2          Moni Talavera RN

## 2019-07-12 ENCOUNTER — READMISSION MANAGEMENT (OUTPATIENT)
Dept: CALL CENTER | Facility: HOSPITAL | Age: 74
End: 2019-07-12

## 2019-07-12 NOTE — OUTREACH NOTE
General Surgery Week 2 Survey      Responses   Facility patient discharged from?  [facility]   Does the patient have one of the following disease processes/diagnoses(primary or secondary)?  General Surgery   Week 2 attempt successful?  No          Pura Shelby RN

## 2019-07-15 ENCOUNTER — TELEPHONE (OUTPATIENT)
Dept: CARDIOLOGY | Facility: CLINIC | Age: 74
End: 2019-07-15

## 2019-07-15 NOTE — TELEPHONE ENCOUNTER
----- Message from BeatrizGAVIN Anderson sent at 7/15/2019  1:46 PM EDT -----  Regarding: FORMER [facility] PT  Mr Maynard stated that Dr Acevedo wants patient to now be seen by Dr Kelsey.  Randi told me Friday and Dr Kelsey is agreeable to this.  The patient stated he does not need an appointment until end of September.  Can I put him on a 9:40 spot towards the end of September?  Thank you,  Erica

## 2019-08-23 ENCOUNTER — OFFICE VISIT (OUTPATIENT)
Dept: CARDIOLOGY | Facility: CLINIC | Age: 74
End: 2019-08-23

## 2019-08-23 VITALS
HEIGHT: 77 IN | BODY MASS INDEX: 28.34 KG/M2 | WEIGHT: 240 LBS | RESPIRATION RATE: 16 BRPM | DIASTOLIC BLOOD PRESSURE: 92 MMHG | SYSTOLIC BLOOD PRESSURE: 148 MMHG | HEART RATE: 74 BPM

## 2019-08-23 DIAGNOSIS — I34.0 NON-RHEUMATIC MITRAL REGURGITATION: ICD-10-CM

## 2019-08-23 DIAGNOSIS — I42.0 DILATED CARDIOMYOPATHY (HCC): ICD-10-CM

## 2019-08-23 DIAGNOSIS — I65.23 ASYMPTOMATIC BILATERAL CAROTID ARTERY STENOSIS: Primary | ICD-10-CM

## 2019-08-23 DIAGNOSIS — I48.19 PERSISTENT ATRIAL FIBRILLATION (HCC): ICD-10-CM

## 2019-08-23 DIAGNOSIS — G47.33 OBSTRUCTIVE SLEEP APNEA: ICD-10-CM

## 2019-08-23 PROCEDURE — 93000 ELECTROCARDIOGRAM COMPLETE: CPT | Performed by: INTERNAL MEDICINE

## 2019-08-23 PROCEDURE — 99215 OFFICE O/P EST HI 40 MIN: CPT | Performed by: INTERNAL MEDICINE

## 2019-08-23 RX ORDER — LISINOPRIL 5 MG/1
5 TABLET ORAL DAILY
Qty: 90 TABLET | Refills: 3 | Status: SHIPPED | OUTPATIENT
Start: 2019-08-23 | End: 2019-09-22

## 2019-08-23 NOTE — PROGRESS NOTES
Date of Office Visit: 2019  Encounter Provider: Jean Pierre Kelsey MD  Place of Service: [facility] CARDIOLOGY  Patient Name: Lucas Maynard  :1945  [id number]    cc: Cardiomyopathy A. fib    HPI: Lucas Maynard is a 74 y.o. male he is here as a new patient to me previously had seen my partner who has left our practice.  He has a history of a cardiomyopathy EF about 45%.  He had a stress test done in 2017 that had a abnormal ECG portion but the nuclear portion showed an apical defect and it was elected to manage him medically at that time.  He has a history of persistent atrial fibrillation he has been tried on Xarelto and Eliquis in the past and has had a lot of nuisance bleeding and so it was stopped.  He does not have sleep apnea that he knows of he drinks 1 or 2 drinks 5 days a week.  He otherwise has been feeling pretty well he had both carotids worked on in the last year.  He does not have chest pain shortness of breath PND orthopnea edema syncope or palpitations    Past Medical History:   Diagnosis Date   • Atrial fibrillation (CMS/HCC)    • CAD (coronary artery disease)    • Cancer (CMS/HCC)     prostate biopsy only no radiation   • Cardiomyopathy (CMS/HCC)    • Carotid stenosis    • DDD (degenerative disc disease), lumbar    • Disease of thyroid gland    • GERD (gastroesophageal reflux disease)    • Hyperlipidemia    • Murmur, heart        Past Surgical History:   Procedure Laterality Date   • CAROTID ENDARTERECTOMY Right 2018    Procedure: RIGHT CAROTID ENDARTERECTOMY;  Surgeon: Javier Acevedo MD;  Location: [facility] OR;  Service: Vascular   • CAROTID ENDARTERECTOMY Left 7/3/2019    Procedure: left CAROTID ENDARTERECTOMY;  Surgeon: Javier Acevedo MD;  Location: [facility] OR;  Service: Vascular   • COLONOSCOPY N/A 2016    Procedure: COLONOSCOPY to terminal ileum with polypectomy;  Surgeon: Hi Quintanilla MD;  Location: [facility] ENDOSCOPY;   Service:    • CYST REMOVAL Right     ANKLE   • ENDOSCOPY     • PROSTATE BIOPSY      dx 2007 cancer no surgery or radiation   • TONSILLECTOMY         Social History     Socioeconomic History   • Marital status: Single     Spouse name: Not on file   • Number of children: Not on file   • Years of education: Not on file   • Highest education level: Not on file   Tobacco Use   • Smoking status: Never Smoker   • Smokeless tobacco: Never Used   Substance and Sexual Activity   • Alcohol use: Yes     Alcohol/week: 6.0 oz     Types: 5 Glasses of wine, 5 Cans of beer per week   • Drug use: No   • Sexual activity: Defer       Family History   Problem Relation Age of Onset   • Malig Hyperthermia Neg Hx        Review of Systems   Constitution: Negative for decreased appetite, fever, malaise/fatigue and weight loss.   HENT: Negative for nosebleeds.    Eyes: Negative for double vision.   Cardiovascular: Negative for chest pain, claudication, cyanosis, dyspnea on exertion, irregular heartbeat, leg swelling, near-syncope, orthopnea, palpitations, paroxysmal nocturnal dyspnea and syncope.   Respiratory: Negative for cough, hemoptysis and shortness of breath.    Hematologic/Lymphatic: Negative for bleeding problem.   Skin: Negative for rash.   Musculoskeletal: Negative for falls and myalgias.   Gastrointestinal: Negative for hematochezia, jaundice, melena, nausea and vomiting.   Genitourinary: Negative for hematuria.   Neurological: Negative for dizziness and seizures.   Psychiatric/Behavioral: Negative for altered mental status and memory loss.       Allergies   Allergen Reactions   • Avelox [Moxifloxacin Hcl] Other (See Comments)     Joint discomfort         Current Outpatient Medications:   •  aspirin 81 MG EC tablet, Take 81 mg by mouth Daily. Per Dr Acevedo to cont to take but not day of surgery, Disp: , Rfl:   •  ipratropium (ATROVENT) 0.03 % nasal spray, 2 sprays into the nostril(s) as directed by provider Every 12 (Twelve)  "Hours., Disp: , Rfl:   •  levothyroxine (SYNTHROID, LEVOTHROID) 50 MCG tablet, Take 50 mcg by mouth Daily., Disp: , Rfl:   •  LYCOPENE PO, Take 80 mg by mouth Daily., Disp: , Rfl:   •  metoprolol succinate XL (TOPROL-XL) 25 MG 24 hr tablet, Take 25 mg by mouth Every Morning., Disp: , Rfl:   •  Multiple Vitamin (MULTI-VITAMIN DAILY PO), Take 1 tablet by mouth Daily., Disp: , Rfl:   •  pantoprazole (PROTONIX) 40 MG EC tablet, Take 40 mg by mouth Every Night., Disp: , Rfl:   •  Probiotic Product (PROBIOTIC ADVANCED PO), Take 2 capsules by mouth Daily., Disp: , Rfl:   •  rosuvastatin (CRESTOR) 20 MG tablet, Take 10 mg by mouth 3 (Three) Times a Week., Disp: , Rfl:   •  sildenafil (REVATIO) 20 MG tablet, Take 20 mg by mouth As Needed., Disp: , Rfl:   •  lisinopril (PRINIVIL,ZESTRIL) 5 MG tablet, Take 1 tablet by mouth Daily for 30 days., Disp: 90 tablet, Rfl: 3      Objective:     Vitals:    08/23/19 0947   BP: 148/92   BP Location: Left arm   Patient Position: Sitting   Pulse: 74   Resp: 16   Weight: 109 kg (240 lb)   Height: 195.6 cm (77\")     Body mass index is 28.46 kg/m².    Physical Exam   Constitutional: He is oriented to person, place, and time. He appears well-developed and well-nourished.   HENT:   Head: Normocephalic.   Eyes: No scleral icterus.   Neck: No JVD present. No thyromegaly present.   Cardiovascular: Normal rate. An irregularly irregular rhythm present. Exam reveals no gallop and no friction rub.   Murmur heard.   Medium-pitched blowing holosystolic murmur is present with a grade of 2/6 at the apex.  Pulmonary/Chest: Effort normal and breath sounds normal. He has no wheezes. He has no rales.   Abdominal: Soft. There is no hepatosplenomegaly. There is no tenderness.   Musculoskeletal: Normal range of motion. He exhibits no edema.   Lymphadenopathy:     He has no cervical adenopathy.   Neurological: He is alert and oriented to person, place, and time.   Skin: Skin is warm and dry. No rash noted. "   Psychiatric: He has a normal mood and affect.         ECG 12 Lead  Date/Time: 8/23/2019 10:18 AM  Performed by: Jean Pierre Kelsey MD  Authorized by: Jean Pierre Kelsey MD   Comparison: compared with previous ECG   Similar to previous ECG  Rhythm: atrial fibrillation  Other findings: non-specific ST-T wave changes    Clinical impression: abnormal EKG             Assessment:       Diagnosis Plan   1. Asymptomatic bilateral carotid artery stenosis  Adult Transthoracic Echo Complete W/ Cont if Necessary Per Protocol    Basic Metabolic Panel    Home Sleep Study   2. Dilated cardiomyopathy (CMS/HCC)  Adult Transthoracic Echo Complete W/ Cont if Necessary Per Protocol    Basic Metabolic Panel    Home Sleep Study   3. Non-rheumatic mitral regurgitation  Adult Transthoracic Echo Complete W/ Cont if Necessary Per Protocol    Basic Metabolic Panel    Home Sleep Study   4. Persistent atrial fibrillation (CMS/HCC)  Adult Transthoracic Echo Complete W/ Cont if Necessary Per Protocol    Basic Metabolic Panel    Home Sleep Study   5. Obstructive sleep apnea   Home Sleep Study          Plan:       Well there are several issues here with Mr. Dasilva #1 he has a cardiomyopathy and of unclear etiology.  I think it warrants a sleep study I recommended he cut his alcohol back I do not think he is drinking enough to make it happen but I do not think he should be drinking if he is got a weak heart.  I think he would benefit from an ACE inhibitor and I think he needs another echo to evaluate his moderate mitral insufficiency.  Probably needs a yearly echo I like him to come and get that in 2 weeks and will check his BMP at that point when to set him up for a home sleep study.  It would be nice to anticoagulate him but has had a lot of nuisance bleeding he knows the risks of not taking it so he is on an aspirin which will not help with his A. fib but will help with his peripheral vascular disease.  I think we have to have a low threshold to  cath him if he were ever to develop symptoms or if his LV function got worse at all because we know he has peripheral vascular disease and he has kind of an equivocal stress test but he does not really have much in the way of symptoms.  I will have him come see Robyn in 6 months and see me in a year    As always, it has been a pleasure to participate in your patient's care.      Sincerely,       Jean Pierre Kelsey MD

## 2019-09-03 ENCOUNTER — HOSPITAL ENCOUNTER (OUTPATIENT)
Dept: CARDIOLOGY | Facility: HOSPITAL | Age: 74
Discharge: HOME | End: 2019-09-03
Admitting: INTERNAL MEDICINE

## 2019-09-03 VITALS
BODY MASS INDEX: 28.34 KG/M2 | SYSTOLIC BLOOD PRESSURE: 134 MMHG | DIASTOLIC BLOOD PRESSURE: 72 MMHG | HEIGHT: 77 IN | HEART RATE: 69 BPM | WEIGHT: 240 LBS

## 2019-09-03 DIAGNOSIS — I65.23 ASYMPTOMATIC BILATERAL CAROTID ARTERY STENOSIS: ICD-10-CM

## 2019-09-03 DIAGNOSIS — I48.19 PERSISTENT ATRIAL FIBRILLATION (HCC): ICD-10-CM

## 2019-09-03 DIAGNOSIS — I34.0 NON-RHEUMATIC MITRAL REGURGITATION: ICD-10-CM

## 2019-09-03 DIAGNOSIS — I42.0 DILATED CARDIOMYOPATHY (HCC): ICD-10-CM

## 2019-09-03 LAB
ASCENDING AORTA: 2.9 CM
BH CV ECHO MEAS - ACS: 2.4 CM
BH CV ECHO MEAS - AI DEC SLOPE: 228.1 CM/SEC^2
BH CV ECHO MEAS - AI MAX PG: 78.5 MMHG
BH CV ECHO MEAS - AI MAX VEL: 443 CM/SEC
BH CV ECHO MEAS - AI P1/2T: 568.8 MSEC
BH CV ECHO MEAS - AO MAX PG (FULL): 6.1 MMHG
BH CV ECHO MEAS - AO MAX PG: 8.7 MMHG
BH CV ECHO MEAS - AO MEAN PG (FULL): 3.5 MMHG
BH CV ECHO MEAS - AO MEAN PG: 4.9 MMHG
BH CV ECHO MEAS - AO ROOT AREA (BSA CORRECTED): 1.4
BH CV ECHO MEAS - AO ROOT AREA: 8.8 CM^2
BH CV ECHO MEAS - AO ROOT DIAM: 3.4 CM
BH CV ECHO MEAS - AO V2 MAX: 147.7 CM/SEC
BH CV ECHO MEAS - AO V2 MEAN: 101 CM/SEC
BH CV ECHO MEAS - AO V2 VTI: 32.4 CM
BH CV ECHO MEAS - AVA(I,A): 2.1 CM^2
BH CV ECHO MEAS - AVA(I,D): 2.1 CM^2
BH CV ECHO MEAS - AVA(V,A): 2.3 CM^2
BH CV ECHO MEAS - AVA(V,D): 2.3 CM^2
BH CV ECHO MEAS - BSA(HAYCOCK): 2.4 M^2
BH CV ECHO MEAS - BSA: 2.4 M^2
BH CV ECHO MEAS - BZI_BMI: 27.3 KILOGRAMS/M^2
BH CV ECHO MEAS - BZI_METRIC_HEIGHT: 195.6 CM
BH CV ECHO MEAS - BZI_METRIC_WEIGHT: 104.3 KG
BH CV ECHO MEAS - EDV(MOD-SP2): 120 ML
BH CV ECHO MEAS - EDV(MOD-SP4): 120 ML
BH CV ECHO MEAS - EDV(TEICH): 220.4 ML
BH CV ECHO MEAS - EF(CUBED): 71.8 %
BH CV ECHO MEAS - EF(MOD-BP): 47 %
BH CV ECHO MEAS - EF(MOD-SP2): 46.7 %
BH CV ECHO MEAS - EF(MOD-SP4): 46.7 %
BH CV ECHO MEAS - EF(TEICH): 62.3 %
BH CV ECHO MEAS - ESV(MOD-SP2): 64 ML
BH CV ECHO MEAS - ESV(MOD-SP4): 64 ML
BH CV ECHO MEAS - ESV(TEICH): 83.1 ML
BH CV ECHO MEAS - FS: 34.4 %
BH CV ECHO MEAS - IVS/LVPW: 1.1
BH CV ECHO MEAS - IVSD: 1.1 CM
BH CV ECHO MEAS - LAT PEAK E' VEL: 10 CM/SEC
BH CV ECHO MEAS - LV DIASTOLIC VOL/BSA (35-75): 50.6 ML/M^2
BH CV ECHO MEAS - LV MASS(C)D: 303.1 GRAMS
BH CV ECHO MEAS - LV MASS(C)DI: 127.7 GRAMS/M^2
BH CV ECHO MEAS - LV MAX PG: 2.7 MMHG
BH CV ECHO MEAS - LV MEAN PG: 1.4 MMHG
BH CV ECHO MEAS - LV SYSTOLIC VOL/BSA (12-30): 27 ML/M^2
BH CV ECHO MEAS - LV V1 MAX: 81.5 CM/SEC
BH CV ECHO MEAS - LV V1 MEAN: 54 CM/SEC
BH CV ECHO MEAS - LV V1 VTI: 16.5 CM
BH CV ECHO MEAS - LVIDD: 6.6 CM
BH CV ECHO MEAS - LVIDS: 4.3 CM
BH CV ECHO MEAS - LVLD AP2: 8.2 CM
BH CV ECHO MEAS - LVLD AP4: 8 CM
BH CV ECHO MEAS - LVLS AP2: 7.2 CM
BH CV ECHO MEAS - LVLS AP4: 6.6 CM
BH CV ECHO MEAS - LVOT AREA (M): 4.2 CM^2
BH CV ECHO MEAS - LVOT AREA: 4.2 CM^2
BH CV ECHO MEAS - LVOT DIAM: 2.3 CM
BH CV ECHO MEAS - LVPWD: 1 CM
BH CV ECHO MEAS - MED PEAK E' VEL: 8 CM/SEC
BH CV ECHO MEAS - MR MAX PG: 109.6 MMHG
BH CV ECHO MEAS - MR MAX VEL: 523.4 CM/SEC
BH CV ECHO MEAS - MV DEC SLOPE: 710.5 CM/SEC^2
BH CV ECHO MEAS - MV DEC TIME: 0.14 SEC
BH CV ECHO MEAS - MV E MAX VEL: 102 CM/SEC
BH CV ECHO MEAS - MV MAX PG: 6.4 MMHG
BH CV ECHO MEAS - MV MEAN PG: 3.3 MMHG
BH CV ECHO MEAS - MV P1/2T MAX VEL: 127.6 CM/SEC
BH CV ECHO MEAS - MV P1/2T: 52.6 MSEC
BH CV ECHO MEAS - MV V2 MAX: 126.2 CM/SEC
BH CV ECHO MEAS - MV V2 MEAN: 84.8 CM/SEC
BH CV ECHO MEAS - MV V2 VTI: 28.3 CM
BH CV ECHO MEAS - MVA P1/2T LCG: 1.7 CM^2
BH CV ECHO MEAS - MVA(P1/2T): 4.2 CM^2
BH CV ECHO MEAS - MVA(VTI): 2.4 CM^2
BH CV ECHO MEAS - PA MAX PG (FULL): 1.2 MMHG
BH CV ECHO MEAS - PA MAX PG: 2.7 MMHG
BH CV ECHO MEAS - PA V2 MAX: 82 CM/SEC
BH CV ECHO MEAS - PVA(V,A): 6.7 CM^2
BH CV ECHO MEAS - PVA(V,D): 6.7 CM^2
BH CV ECHO MEAS - QP/QS: 1.8
BH CV ECHO MEAS - RAP SYSTOLE: 3 MMHG
BH CV ECHO MEAS - RV MAX PG: 1.5 MMHG
BH CV ECHO MEAS - RV MEAN PG: 0.84 MMHG
BH CV ECHO MEAS - RV V1 MAX: 60.6 CM/SEC
BH CV ECHO MEAS - RV V1 MEAN: 42.9 CM/SEC
BH CV ECHO MEAS - RV V1 VTI: 13.3 CM
BH CV ECHO MEAS - RVOT AREA: 9.1 CM^2
BH CV ECHO MEAS - RVOT DIAM: 3.4 CM
BH CV ECHO MEAS - RVSP: 29 MMHG
BH CV ECHO MEAS - SI(AO): 120.3 ML/M^2
BH CV ECHO MEAS - SI(CUBED): 85.4 ML/M^2
BH CV ECHO MEAS - SI(LVOT): 29.1 ML/M^2
BH CV ECHO MEAS - SI(MOD-SP2): 23.6 ML/M^2
BH CV ECHO MEAS - SI(MOD-SP4): 23.6 ML/M^2
BH CV ECHO MEAS - SI(TEICH): 57.9 ML/M^2
BH CV ECHO MEAS - SUP REN AO DIAM: 2.2 CM
BH CV ECHO MEAS - SV(AO): 285.5 ML
BH CV ECHO MEAS - SV(CUBED): 202.6 ML
BH CV ECHO MEAS - SV(LVOT): 69 ML
BH CV ECHO MEAS - SV(MOD-SP2): 56 ML
BH CV ECHO MEAS - SV(MOD-SP4): 56 ML
BH CV ECHO MEAS - SV(RVOT): 121.4 ML
BH CV ECHO MEAS - SV(TEICH): 137.4 ML
BH CV ECHO MEAS - TAPSE (>1.6): 2.2 CM2
BH CV ECHO MEAS - TR MAX VEL: 253.4 CM/SEC
BH CV ECHO MEASUREMENTS AVERAGE E/E' RATIO: 11.33
BH CV XLRA - RV BASE: 3.8 CM
BH CV XLRA - TDI S': 12 CM/SEC
LEFT ATRIUM VOLUME INDEX: 59 ML/M2
LEFT ATRIUM VOLUME: 142 CM3
LV EF 2D ECHO EST: 47 %
MAXIMAL PREDICTED HEART RATE: 144 BPM
SINUS: 3 CM
STJ: 2.9 CM
STRESS TARGET HR: 122 BPM

## 2019-09-03 PROCEDURE — 93306 TTE W/DOPPLER COMPLETE: CPT

## 2019-09-03 PROCEDURE — 93306 TTE W/DOPPLER COMPLETE: CPT | Performed by: INTERNAL MEDICINE

## 2019-09-06 ENCOUNTER — TELEPHONE (OUTPATIENT)
Dept: CARDIOLOGY | Facility: CLINIC | Age: 74
End: 2019-09-06

## 2019-09-06 NOTE — TELEPHONE ENCOUNTER
----- Message from Jean Pierre Kelsey MD sent at 9/6/2019 11:12 AM EDT -----  I called and no change from prior but long talk will stop the asa and see if we have xarelto 15 mg a day samples.  If there are no samples we will have to send in a script

## 2019-09-24 ENCOUNTER — HOSPITAL ENCOUNTER (OUTPATIENT)
Dept: SLEEP MEDICINE | Facility: HOSPITAL | Age: 74
Discharge: HOME | End: 2019-09-24
Admitting: INTERNAL MEDICINE

## 2019-09-24 DIAGNOSIS — I34.0 NON-RHEUMATIC MITRAL REGURGITATION: ICD-10-CM

## 2019-09-24 DIAGNOSIS — I48.19 PERSISTENT ATRIAL FIBRILLATION (HCC): ICD-10-CM

## 2019-09-24 DIAGNOSIS — G47.33 OBSTRUCTIVE SLEEP APNEA: ICD-10-CM

## 2019-09-24 DIAGNOSIS — I42.0 DILATED CARDIOMYOPATHY (HCC): ICD-10-CM

## 2019-09-24 DIAGNOSIS — I65.23 ASYMPTOMATIC BILATERAL CAROTID ARTERY STENOSIS: ICD-10-CM

## 2019-09-24 PROCEDURE — 95806 SLEEP STUDY UNATT&RESP EFFT: CPT | Performed by: INTERNAL MEDICINE

## 2019-09-24 PROCEDURE — 95806 SLEEP STUDY UNATT&RESP EFFT: CPT

## 2019-10-02 ENCOUNTER — TELEPHONE (OUTPATIENT)
Dept: SLEEP MEDICINE | Facility: HOSPITAL | Age: 74
End: 2019-10-02

## 2019-10-11 ENCOUNTER — TELEPHONE (OUTPATIENT)
Dept: CARDIOLOGY | Facility: CLINIC | Age: 74
End: 2019-10-11

## 2019-10-11 NOTE — TELEPHONE ENCOUNTER
Advised pt. Pt verbalized understanding and stated he will call on at the end of the week next week.    LALI Bob

## 2019-10-11 NOTE — TELEPHONE ENCOUNTER
Pt L/M re: c/o body and muscle cramps all over. Pt stated he can no longer take the Xarelto as he feels like that's the cause of these cramps all over his whole body.  Please advise.    LALI Bob

## 2019-10-25 ENCOUNTER — TELEPHONE (OUTPATIENT)
Dept: CARDIOLOGY | Facility: CLINIC | Age: 74
End: 2019-10-25

## 2019-10-25 NOTE — TELEPHONE ENCOUNTER
Pt left a voice mail stating since he was put on xarelto he has been feeling bad, severe joint pain and body aches, so the pt stopped taking the xarelto and feels much better, the pt is wanting to know if he can start taking 81 mg aspirin and advil, please advise.    Thank you  Linsey

## 2019-10-25 NOTE — TELEPHONE ENCOUNTER
I called and he is really intolerant to direct oral anticoagulants and I think he would just wants to stay on aspirin I talked with him about it and I think that is probably the best option we did talk about warfarin and he does not have a lot of interest in not so will stay on aspirin

## 2020-03-19 ENCOUNTER — TELEPHONE (OUTPATIENT)
Dept: CARDIOLOGY | Facility: CLINIC | Age: 75
End: 2020-03-19

## 2020-08-26 ENCOUNTER — OFFICE VISIT (OUTPATIENT)
Dept: CARDIOLOGY | Facility: CLINIC | Age: 75
End: 2020-08-26

## 2020-08-26 VITALS
HEIGHT: 77 IN | HEART RATE: 59 BPM | DIASTOLIC BLOOD PRESSURE: 74 MMHG | WEIGHT: 241 LBS | SYSTOLIC BLOOD PRESSURE: 136 MMHG | BODY MASS INDEX: 28.46 KG/M2

## 2020-08-26 DIAGNOSIS — I34.0 NON-RHEUMATIC MITRAL REGURGITATION: Primary | ICD-10-CM

## 2020-08-26 DIAGNOSIS — I42.0 DILATED CARDIOMYOPATHY (HCC): ICD-10-CM

## 2020-08-26 DIAGNOSIS — R07.2 PRECORDIAL PAIN: ICD-10-CM

## 2020-08-26 DIAGNOSIS — E78.49 OTHER HYPERLIPIDEMIA: ICD-10-CM

## 2020-08-26 PROCEDURE — 93000 ELECTROCARDIOGRAM COMPLETE: CPT | Performed by: INTERNAL MEDICINE

## 2020-08-26 PROCEDURE — 99214 OFFICE O/P EST MOD 30 MIN: CPT | Performed by: INTERNAL MEDICINE

## 2020-08-26 RX ORDER — LISINOPRIL 5 MG/1
5 TABLET ORAL DAILY
COMMUNITY
Start: 2020-06-24 | End: 2021-06-24

## 2020-08-26 NOTE — PROGRESS NOTES
Date of Office Visit: 2019  Encounter Provider: Jean Pierre Kelsey MD  Place of Service: [facility] CARDIOLOGY  Patient Name: Lucas Maynard  :1945  [id number]    cc: Cardiomyopathy A. fib    HPI: Lucas Maynard is a 75 y.o. male he is here as a new patient to me previously had seen my partner who has left our practice.  He has a history of a cardiomyopathy EF about 45%.  He had a stress test done in 2017 that had a abnormal ECG portion but the nuclear portion showed an apical defect and it was elected to manage him medically at that time.  He has a history of persistent atrial fibrillation he has been tried on Xarelto and Eliquis in the past and has had a lot of nuisance bleeding and so it was stopped.  He does not have sleep apnea that he knows of he drinks 1 or 2 drinks 5 days a week.  He otherwise has been feeling pretty well he had both carotids worked on in the last year.     In general he has been doing okay although he does note that he has some discomfort in his chest left upper chest with activity sometimes it happens after few minutes of activity sometimes he can exercise for 10 minutes is a little bit inconsistent he has some shortness of breath with that.  He is a little bit concerned about his coronaries because he has had both carotids operated on before in the past and he knows that he is got blood vessel disease he has not had any bleeding problems no PND no orthopnea a little bit of trace edema in his legs.  No palpitations    Past Medical History:   Diagnosis Date   • Atrial fibrillation (CMS/HCC)    • CAD (coronary artery disease)    • Cancer (CMS/HCC)     prostate biopsy only no radiation   • Cardiomyopathy (CMS/HCC)    • Carotid stenosis    • DDD (degenerative disc disease), lumbar    • Disease of thyroid gland    • GERD (gastroesophageal reflux disease)    • Hyperlipidemia    • Murmur, heart        Past Surgical History:   Procedure Laterality Date   •  CAROTID ENDARTERECTOMY Right 12/20/2018    Procedure: RIGHT CAROTID ENDARTERECTOMY;  Surgeon: Javier Acevedo MD;  Location: [facility] MAIN OR;  Service: Vascular   • CAROTID ENDARTERECTOMY Left 7/3/2019    Procedure: left CAROTID ENDARTERECTOMY;  Surgeon: Javier Acevedo MD;  Location: [facility] MAIN OR;  Service: Vascular   • COLONOSCOPY N/A 7/27/2016    Procedure: COLONOSCOPY to terminal ileum with polypectomy;  Surgeon: Hi Quintanilla MD;  Location: [facility] ENDOSCOPY;  Service:    • CYST REMOVAL Right     ANKLE   • ENDOSCOPY     • PROSTATE BIOPSY      dx 2007 cancer no surgery or radiation   • TONSILLECTOMY         Social History     Socioeconomic History   • Marital status: Single     Spouse name: Not on file   • Number of children: Not on file   • Years of education: Not on file   • Highest education level: Not on file   Tobacco Use   • Smoking status: Never Smoker   • Smokeless tobacco: Never Used   Substance and Sexual Activity   • Alcohol use: Yes     Alcohol/week: 10.0 standard drinks     Types: 5 Glasses of wine, 5 Cans of beer per week   • Drug use: No   • Sexual activity: Defer       Family History   Problem Relation Age of Onset   • Malig Hyperthermia Neg Hx        Review of Systems   Constitution: Negative for decreased appetite, fever, malaise/fatigue and weight loss.   HENT: Negative for nosebleeds.    Eyes: Negative for double vision.   Cardiovascular: Negative for chest pain, claudication, cyanosis, dyspnea on exertion, irregular heartbeat, leg swelling, near-syncope, orthopnea, palpitations, paroxysmal nocturnal dyspnea and syncope.   Respiratory: Negative for cough, hemoptysis and shortness of breath.    Hematologic/Lymphatic: Negative for bleeding problem.   Skin: Negative for rash.   Musculoskeletal: Negative for falls and myalgias.   Gastrointestinal: Negative for hematochezia, jaundice, melena, nausea and vomiting.   Genitourinary: Negative for hematuria.   Neurological: Negative for  "dizziness and seizures.   Psychiatric/Behavioral: Negative for altered mental status and memory loss.       Allergies   Allergen Reactions   • Avelox [Moxifloxacin Hcl] Other (See Comments)     Joint discomfort   • Xarelto [Rivaroxaban] Myalgia   • Eliquis [Apixaban] Rash         Current Outpatient Medications:   •  aspirin 81 MG EC tablet, Take 81 mg by mouth Daily. Per Dr Acevedo to cont to take but not day of surgery, Disp: , Rfl:   •  ipratropium (ATROVENT) 0.03 % nasal spray, 2 sprays into the nostril(s) as directed by provider Every 12 (Twelve) Hours., Disp: , Rfl:   •  levothyroxine (SYNTHROID, LEVOTHROID) 50 MCG tablet, Take 50 mcg by mouth Daily., Disp: , Rfl:   •  lisinopril (PRINIVIL,ZESTRIL) 5 MG tablet, Take 5 mg by mouth Daily., Disp: , Rfl:   •  LYCOPENE PO, Take 80 mg by mouth Daily., Disp: , Rfl:   •  metoprolol succinate XL (TOPROL-XL) 25 MG 24 hr tablet, Take 25 mg by mouth Every Morning., Disp: , Rfl:   •  Multiple Vitamin (MULTI-VITAMIN DAILY PO), Take 1 tablet by mouth Daily., Disp: , Rfl:   •  pantoprazole (PROTONIX) 40 MG EC tablet, Take 40 mg by mouth Every Night., Disp: , Rfl:   •  Probiotic Product (PROBIOTIC ADVANCED PO), Take 2 capsules by mouth Daily., Disp: , Rfl:   •  rosuvastatin (CRESTOR) 20 MG tablet, Take 10 mg by mouth 3 (Three) Times a Week., Disp: , Rfl:   •  sildenafil (REVATIO) 20 MG tablet, Take 20 mg by mouth As Needed., Disp: , Rfl:       Objective:     Vitals:    08/26/20 0945   BP: 136/74   BP Location: Left arm   Patient Position: Sitting   Pulse: 59   Weight: 109 kg (241 lb)   Height: 195.6 cm (77\")     Body mass index is 28.58 kg/m².    Physical Exam   Constitutional: He is oriented to person, place, and time. He appears well-developed and well-nourished.   HENT:   Head: Normocephalic.   Eyes: No scleral icterus.   Neck: No JVD present. No thyromegaly present.   Cardiovascular: Normal rate. An irregularly irregular rhythm present. Exam reveals no gallop and no " friction rub.   Murmur heard.   Medium-pitched blowing holosystolic murmur is present with a grade of 2/6 at the apex.  Pulmonary/Chest: Effort normal and breath sounds normal. He has no wheezes. He has no rales.   Abdominal: Soft. There is no hepatosplenomegaly. There is no tenderness.   Musculoskeletal: Normal range of motion. He exhibits no edema.   Lymphadenopathy:     He has no cervical adenopathy.   Neurological: He is alert and oriented to person, place, and time.   Skin: Skin is warm and dry. No rash noted.   Psychiatric: He has a normal mood and affect.         ECG 12 Lead  Date/Time: 8/26/2020 10:28 AM  Performed by: Jean Pierre Kelsey MD  Authorized by: Jean Pierre Kelsey MD   Comparison: compared with previous ECG   Similar to previous ECG  Rhythm: atrial fibrillation  Other findings: non-specific ST-T wave changes    Clinical impression: abnormal EKG             Assessment:       Diagnosis Plan   1. Non-rheumatic mitral regurgitation  Adult Transthoracic Echo Complete W/ Cont if Necessary Per Protocol    Stress Test With Myocardial Perfusion One Day   2. Dilated cardiomyopathy (CMS/HCC)  Adult Transthoracic Echo Complete W/ Cont if Necessary Per Protocol    Stress Test With Myocardial Perfusion One Day   3. Other hyperlipidemia  Adult Transthoracic Echo Complete W/ Cont if Necessary Per Protocol    Stress Test With Myocardial Perfusion One Day   4. Precordial pain  Adult Transthoracic Echo Complete W/ Cont if Necessary Per Protocol    Stress Test With Myocardial Perfusion One Day          Plan:       He is got a lot of issues going on but the most concerning thing is he is got new onset it sounds like may be of angina.  I think we should get a stress test on him on his exam he is got at least moderate MR and that is what he had on his last echo so I would like to reecho him and make sure that has not changed his medical therapy is pretty good I do not see any acute changes on his ECG if that has not changed  we will just have him come back and see us in a year sooner if he has trouble    As always, it has been a pleasure to participate in your patient's care.      Sincerely,       Jean Pierre Kelsey MD

## 2020-09-08 ENCOUNTER — HOSPITAL ENCOUNTER (OUTPATIENT)
Dept: CARDIOLOGY | Facility: HOSPITAL | Age: 75
Discharge: HOME | End: 2020-09-08

## 2020-09-08 VITALS — WEIGHT: 240.3 LBS | HEIGHT: 77 IN | BODY MASS INDEX: 28.37 KG/M2

## 2020-09-08 VITALS — BODY MASS INDEX: 28.46 KG/M2 | HEIGHT: 77 IN | WEIGHT: 241 LBS | HEART RATE: 79 BPM

## 2020-09-08 DIAGNOSIS — I42.0 DILATED CARDIOMYOPATHY (HCC): ICD-10-CM

## 2020-09-08 DIAGNOSIS — R07.2 PRECORDIAL PAIN: ICD-10-CM

## 2020-09-08 DIAGNOSIS — I34.0 NON-RHEUMATIC MITRAL REGURGITATION: ICD-10-CM

## 2020-09-08 DIAGNOSIS — E78.49 OTHER HYPERLIPIDEMIA: ICD-10-CM

## 2020-09-08 LAB
BH CV NUCLEAR PRIOR STUDY: 2
BH CV STRESS BP STAGE 1: NORMAL
BH CV STRESS COMMENTS STAGE 1: NORMAL
BH CV STRESS DOSE REGADENOSON STAGE 1: 0.4
BH CV STRESS DURATION MIN STAGE 1: 0
BH CV STRESS DURATION SEC STAGE 1: 10
BH CV STRESS HR STAGE 1: 88
BH CV STRESS PROTOCOL 1: NORMAL
BH CV STRESS RECOVERY BP: NORMAL MMHG
BH CV STRESS RECOVERY HR: 78 BPM
BH CV STRESS STAGE 1: 1
LV EF NUC BP: 55 %
MAXIMAL PREDICTED HEART RATE: 143 BPM
PERCENT MAX PREDICTED HR: 61.54 %
STRESS BASELINE BP: NORMAL MMHG
STRESS BASELINE HR: 76 BPM
STRESS PERCENT HR: 72 %
STRESS POST EXERCISE DUR SEC: 10 SEC
STRESS POST PEAK BP: NORMAL MMHG
STRESS POST PEAK HR: 88 BPM
STRESS TARGET HR: 122 BPM

## 2020-09-08 PROCEDURE — A9502 TC99M TETROFOSMIN: HCPCS | Performed by: INTERNAL MEDICINE

## 2020-09-08 PROCEDURE — 78452 HT MUSCLE IMAGE SPECT MULT: CPT

## 2020-09-08 PROCEDURE — 93017 CV STRESS TEST TRACING ONLY: CPT

## 2020-09-08 PROCEDURE — 0 TECHNETIUM TETROFOSMIN KIT: Performed by: INTERNAL MEDICINE

## 2020-09-08 PROCEDURE — 25010000002 REGADENOSON 0.4 MG/5ML SOLUTION: Performed by: INTERNAL MEDICINE

## 2020-09-08 PROCEDURE — 78452 HT MUSCLE IMAGE SPECT MULT: CPT | Performed by: INTERNAL MEDICINE

## 2020-09-08 PROCEDURE — 93306 TTE W/DOPPLER COMPLETE: CPT | Performed by: INTERNAL MEDICINE

## 2020-09-08 PROCEDURE — 93306 TTE W/DOPPLER COMPLETE: CPT

## 2020-09-08 PROCEDURE — 93018 CV STRESS TEST I&R ONLY: CPT | Performed by: INTERNAL MEDICINE

## 2020-09-08 PROCEDURE — 93016 CV STRESS TEST SUPVJ ONLY: CPT | Performed by: INTERNAL MEDICINE

## 2020-09-08 RX ADMIN — TETROFOSMIN 1 DOSE: 1.38 INJECTION, POWDER, LYOPHILIZED, FOR SOLUTION INTRAVENOUS at 09:00

## 2020-09-08 RX ADMIN — TETROFOSMIN 1 DOSE: 1.38 INJECTION, POWDER, LYOPHILIZED, FOR SOLUTION INTRAVENOUS at 08:44

## 2020-09-08 RX ADMIN — REGADENOSON 0.4 MG: 0.08 INJECTION, SOLUTION INTRAVENOUS at 09:00

## 2020-09-09 LAB
AORTIC ARCH: 3.1 CM
ASCENDING AORTA: 2.8 CM
BH CV ECHO MEAS - ACS: 2.1 CM
BH CV ECHO MEAS - AI DEC SLOPE: 262.8 CM/SEC^2
BH CV ECHO MEAS - AI MAX PG: 84.2 MMHG
BH CV ECHO MEAS - AI MAX VEL: 458.8 CM/SEC
BH CV ECHO MEAS - AI P1/2T: 511.3 MSEC
BH CV ECHO MEAS - AO ARCH DIAM (PROXIMAL TRANS.): 3.1 CM
BH CV ECHO MEAS - AO MAX PG (FULL): 4.5 MMHG
BH CV ECHO MEAS - AO MAX PG: 6.8 MMHG
BH CV ECHO MEAS - AO MEAN PG (FULL): 2.9 MMHG
BH CV ECHO MEAS - AO MEAN PG: 4.3 MMHG
BH CV ECHO MEAS - AO ROOT AREA (BSA CORRECTED): 1.2
BH CV ECHO MEAS - AO ROOT AREA: 6.8 CM^2
BH CV ECHO MEAS - AO ROOT DIAM: 3 CM
BH CV ECHO MEAS - AO V2 MAX: 130.3 CM/SEC
BH CV ECHO MEAS - AO V2 MEAN: 97.7 CM/SEC
BH CV ECHO MEAS - AO V2 VTI: 24 CM
BH CV ECHO MEAS - ASC AORTA: 2.8 CM
BH CV ECHO MEAS - AVA(I,A): 2.9 CM^2
BH CV ECHO MEAS - AVA(I,D): 2.9 CM^2
BH CV ECHO MEAS - AVA(V,A): 2.3 CM^2
BH CV ECHO MEAS - AVA(V,D): 2.3 CM^2
BH CV ECHO MEAS - BSA(HAYCOCK): 2.5 M^2
BH CV ECHO MEAS - BSA: 2.4 M^2
BH CV ECHO MEAS - BZI_BMI: 28.6 KILOGRAMS/M^2
BH CV ECHO MEAS - BZI_METRIC_HEIGHT: 195.6 CM
BH CV ECHO MEAS - BZI_METRIC_WEIGHT: 109.3 KG
BH CV ECHO MEAS - EDV(MOD-SP2): 92 ML
BH CV ECHO MEAS - EDV(MOD-SP4): 114 ML
BH CV ECHO MEAS - EDV(TEICH): 150 ML
BH CV ECHO MEAS - EF(CUBED): 45.9 %
BH CV ECHO MEAS - EF(MOD-BP): 43.7 %
BH CV ECHO MEAS - EF(MOD-SP2): 42.4 %
BH CV ECHO MEAS - EF(MOD-SP4): 45.6 %
BH CV ECHO MEAS - EF(TEICH): 37.8 %
BH CV ECHO MEAS - ESV(MOD-SP2): 53 ML
BH CV ECHO MEAS - ESV(MOD-SP4): 62 ML
BH CV ECHO MEAS - ESV(TEICH): 93.2 ML
BH CV ECHO MEAS - FS: 18.5 %
BH CV ECHO MEAS - IVS/LVPW: 1
BH CV ECHO MEAS - IVSD: 1.2 CM
BH CV ECHO MEAS - LAT PEAK E' VEL: 8.8 CM/SEC
BH CV ECHO MEAS - LV DIASTOLIC VOL/BSA (35-75): 47.1 ML/M^2
BH CV ECHO MEAS - LV MASS(C)D: 284 GRAMS
BH CV ECHO MEAS - LV MASS(C)DI: 117.3 GRAMS/M^2
BH CV ECHO MEAS - LV MAX PG: 2.2 MMHG
BH CV ECHO MEAS - LV MEAN PG: 1.4 MMHG
BH CV ECHO MEAS - LV SYSTOLIC VOL/BSA (12-30): 25.6 ML/M^2
BH CV ECHO MEAS - LV V1 MAX: 74.9 CM/SEC
BH CV ECHO MEAS - LV V1 MEAN: 57.8 CM/SEC
BH CV ECHO MEAS - LV V1 VTI: 17.4 CM
BH CV ECHO MEAS - LVIDD: 5.5 CM
BH CV ECHO MEAS - LVIDS: 4.5 CM
BH CV ECHO MEAS - LVLD AP2: 7.9 CM
BH CV ECHO MEAS - LVLD AP4: 8 CM
BH CV ECHO MEAS - LVLS AP2: 7.4 CM
BH CV ECHO MEAS - LVLS AP4: 6.9 CM
BH CV ECHO MEAS - LVOT AREA (M): 4.2 CM^2
BH CV ECHO MEAS - LVOT AREA: 4 CM^2
BH CV ECHO MEAS - LVOT DIAM: 2.3 CM
BH CV ECHO MEAS - LVPWD: 1.2 CM
BH CV ECHO MEAS - MED PEAK E' VEL: 7.8 CM/SEC
BH CV ECHO MEAS - MR MAX PG: 122.9 MMHG
BH CV ECHO MEAS - MR MAX VEL: 554.4 CM/SEC
BH CV ECHO MEAS - MV DEC TIME: 0.13 SEC
BH CV ECHO MEAS - MV E MAX VEL: 89.2 CM/SEC
BH CV ECHO MEAS - MV MAX PG: 4.1 MMHG
BH CV ECHO MEAS - MV MEAN PG: 2 MMHG
BH CV ECHO MEAS - MV V2 MAX: 101.8 CM/SEC
BH CV ECHO MEAS - MV V2 MEAN: 66.1 CM/SEC
BH CV ECHO MEAS - MV V2 VTI: 27.4 CM
BH CV ECHO MEAS - MVA(VTI): 2.5 CM^2
BH CV ECHO MEAS - PA MAX PG (FULL): 1.9 MMHG
BH CV ECHO MEAS - PA MAX PG: 3.1 MMHG
BH CV ECHO MEAS - PA V2 MAX: 87.7 CM/SEC
BH CV ECHO MEAS - PVA(V,A): 4.8 CM^2
BH CV ECHO MEAS - PVA(V,D): 4.8 CM^2
BH CV ECHO MEAS - QP/QS: 1.4
BH CV ECHO MEAS - RAP SYSTOLE: 3 MMHG
BH CV ECHO MEAS - RV MAX PG: 1.2 MMHG
BH CV ECHO MEAS - RV MEAN PG: 0.74 MMHG
BH CV ECHO MEAS - RV V1 MAX: 55.3 CM/SEC
BH CV ECHO MEAS - RV V1 MEAN: 40.8 CM/SEC
BH CV ECHO MEAS - RV V1 VTI: 12.9 CM
BH CV ECHO MEAS - RVOT AREA: 7.7 CM^2
BH CV ECHO MEAS - RVOT DIAM: 3.1 CM
BH CV ECHO MEAS - RVSP: 42.7 MMHG
BH CV ECHO MEAS - SI(AO): 67.8 ML/M^2
BH CV ECHO MEAS - SI(CUBED): 32.2 ML/M^2
BH CV ECHO MEAS - SI(LVOT): 28.8 ML/M^2
BH CV ECHO MEAS - SI(MOD-SP2): 16.1 ML/M^2
BH CV ECHO MEAS - SI(MOD-SP4): 21.5 ML/M^2
BH CV ECHO MEAS - SI(TEICH): 23.4 ML/M^2
BH CV ECHO MEAS - SUP REN AO DIAM: 1.6 CM
BH CV ECHO MEAS - SV(AO): 164.1 ML
BH CV ECHO MEAS - SV(CUBED): 78 ML
BH CV ECHO MEAS - SV(LVOT): 69.7 ML
BH CV ECHO MEAS - SV(MOD-SP2): 39 ML
BH CV ECHO MEAS - SV(MOD-SP4): 52 ML
BH CV ECHO MEAS - SV(RVOT): 99.1 ML
BH CV ECHO MEAS - SV(TEICH): 56.8 ML
BH CV ECHO MEAS - TAPSE (>1.6): 2.1 CM
BH CV ECHO MEAS - TR MAX VEL: 314.9 CM/SEC
BH CV ECHO MEASUREMENTS AVERAGE E/E' RATIO: 10.75
BH CV XLRA - RV BASE: 4 CM
BH CV XLRA - RV LENGTH: 8.1 CM
BH CV XLRA - RV MID: 2.9 CM
BH CV XLRA - TDI S': 12.8 CM/SEC
LEFT ATRIUM VOLUME INDEX: 59 ML/M2
LEFT ATRIUM VOLUME: 142 CM3
MAXIMAL PREDICTED HEART RATE: 143 BPM
SINUS: 3.1 CM
STJ: 2.9 CM
STRESS TARGET HR: 122 BPM

## 2021-08-26 ENCOUNTER — OFFICE VISIT (OUTPATIENT)
Dept: CARDIOLOGY | Facility: CLINIC | Age: 76
End: 2021-08-26

## 2021-08-26 ENCOUNTER — TRANSCRIBE ORDERS (OUTPATIENT)
Dept: CARDIOLOGY | Facility: CLINIC | Age: 76
End: 2021-08-26

## 2021-08-26 VITALS
DIASTOLIC BLOOD PRESSURE: 68 MMHG | HEART RATE: 60 BPM | BODY MASS INDEX: 28.08 KG/M2 | WEIGHT: 237.8 LBS | HEIGHT: 77 IN | SYSTOLIC BLOOD PRESSURE: 118 MMHG

## 2021-08-26 DIAGNOSIS — Z13.6 SCREENING FOR CARDIOVASCULAR CONDITION: Primary | ICD-10-CM

## 2021-08-26 DIAGNOSIS — R55 SYNCOPE AND COLLAPSE: ICD-10-CM

## 2021-08-26 DIAGNOSIS — E78.49 OTHER HYPERLIPIDEMIA: ICD-10-CM

## 2021-08-26 DIAGNOSIS — I34.0 NON-RHEUMATIC MITRAL REGURGITATION: ICD-10-CM

## 2021-08-26 DIAGNOSIS — I48.19 PERSISTENT ATRIAL FIBRILLATION (HCC): Primary | ICD-10-CM

## 2021-08-26 DIAGNOSIS — R07.89 OTHER CHEST PAIN: ICD-10-CM

## 2021-08-26 DIAGNOSIS — I42.0 DILATED CARDIOMYOPATHY (HCC): ICD-10-CM

## 2021-08-26 DIAGNOSIS — Z01.810 PREPROCEDURAL CARDIOVASCULAR EXAMINATION: ICD-10-CM

## 2021-08-26 PROCEDURE — 93000 ELECTROCARDIOGRAM COMPLETE: CPT | Performed by: NURSE PRACTITIONER

## 2021-08-26 PROCEDURE — 99214 OFFICE O/P EST MOD 30 MIN: CPT | Performed by: NURSE PRACTITIONER

## 2021-08-26 RX ORDER — LISINOPRIL 5 MG/1
5 TABLET ORAL DAILY
COMMUNITY
Start: 2021-08-24 | End: 2021-09-22 | Stop reason: HOSPADM

## 2021-08-26 NOTE — PROGRESS NOTES
"  Date of Office Visit: 2021  Encounter Provider: CAROL Gaona  Place of Service: [facility] CARDIOLOGY  Patient Name: Lucas Maynard  :1945    Chief Complaint   Patient presents with   • Atrial Fibrillation   :     HPI: Lucas Maynard is a 76 y.o. male who presents today for follow-up.  Old records have been obtained and reviewed by me.  He is a patient of Dr. Kelsey's with a past cardiac history significant for persistent atrial fibrillation.  He has been managed medically for cardiomyopathy as well as an abnormal nuclear stress test noting an apical defect.  He was previously anticoagulated on Xarelto.  However, this was discontinued in  due to severe muscle cramping.  Evidently he developed a lot of nuisance bleeding when on Eliquis.   He was last seen in the office by Dr. Kelsey in 2020 at which time he was reporting some discomfort in his left upper chest along with mild shortness of breath with exertion.  Additionally, Dr. Kelsey noted a moderate MR murmur.  A stress test and echocardiogram were ordered.  The echocardiogram noted mildly decreased LV systolic function with an EF of 43%, hypokinesis of the basal inferolateral and basal inferior walls, moderate mitral regurgitation with an eccentric jet, mild TR, and mild pulmonary hypertension with an RVSP of 42 mmHg.  The stress test was negative for ischemia.  He is here today for annual follow-up.   He continues reporting symptoms of fatigue, chest tightness and shortness of breath on exertion, and an \"indigestion pain\" in his neck and back.  He walks on the treadmill regularly.  He walks for 20 minutes on Monday without any issues.  However, sometimes he develops symptoms within 5 minutes.  He also reports chest tightness when bending over and tying his shoes.  Additionally, he is reporting balance issues and staggering and feeling like his head is in a fog.  Reportedly, his symptoms overall " "are not too much different than they were last year.  However, he does think the chest tightness is occurring more frequently.  Evidently he also fell about 7 weeks ago.  He is not entirely sure what happened.  Only thing he knows is he \"went down\" in the [employer] [place] parking lot.  He hit his head and his knee.  He does not have any recall of the entire event.  He is unsure if he lost consciousness.  He denies any palpitations or edema.  He follows with vascular annually and is scheduled to see them next month.    Past Medical History:   Diagnosis Date   • Atrial fibrillation (CMS/HCC)    • CAD (coronary artery disease)    • Cancer (CMS/HCC)     prostate biopsy only no radiation   • Cardiomyopathy (CMS/HCC)    • Carotid stenosis    • DDD (degenerative disc disease), lumbar    • Disease of thyroid gland    • GERD (gastroesophageal reflux disease)    • Hyperlipidemia    • Murmur, heart        Past Surgical History:   Procedure Laterality Date   • CAROTID ENDARTERECTOMY Right 12/20/2018    Procedure: RIGHT CAROTID ENDARTERECTOMY;  Surgeon: Javier Acevedo MD;  Location: [facility] OR;  Service: Vascular   • CAROTID ENDARTERECTOMY Left 7/3/2019    Procedure: left CAROTID ENDARTERECTOMY;  Surgeon: Javier Acevedo MD;  Location: [facility] OR;  Service: Vascular   • COLONOSCOPY N/A 7/27/2016    Procedure: COLONOSCOPY to terminal ileum with polypectomy;  Surgeon: Hi Quintanilla MD;  Location: [facility] ENDOSCOPY;  Service:    • CYST REMOVAL Right     ANKLE   • ENDOSCOPY     • PROSTATE BIOPSY      dx 2007 cancer no surgery or radiation   • TONSILLECTOMY         Social History     Socioeconomic History   • Marital status: Single     Spouse name: Not on file   • Number of children: Not on file   • Years of education: Not on file   • Highest education level: Not on file   Tobacco Use   • Smoking status: Never Smoker   • Smokeless tobacco: Never Used   Substance and Sexual Activity   • Alcohol use: Yes     " Alcohol/week: 10.0 standard drinks     Types: 5 Glasses of wine, 5 Cans of beer per week   • Drug use: No   • Sexual activity: Defer       Family History   Problem Relation Age of Onset   • Malig Hyperthermia Neg Hx        Review of Systems   Constitutional: Positive for malaise/fatigue.   Cardiovascular: Positive for chest pain, dyspnea on exertion and syncope. Negative for leg swelling, orthopnea and paroxysmal nocturnal dyspnea.   Respiratory: Negative.    Hematologic/Lymphatic: Negative for bleeding problem.   Musculoskeletal: Positive for falls.   Gastrointestinal: Negative for melena.   Neurological: Positive for light-headedness and loss of balance. Negative for dizziness.       Allergies   Allergen Reactions   • Avelox [Moxifloxacin Hcl] Other (See Comments)     Joint discomfort   • Statins Myalgia   • Xarelto [Rivaroxaban] Myalgia   • Eliquis [Apixaban] Rash         Current Outpatient Medications:   •  aspirin 81 MG EC tablet, Take 81 mg by mouth Daily. Per Dr Acevedo to cont to take but not day of surgery, Disp: , Rfl:   •  ipratropium (ATROVENT) 0.03 % nasal spray, 2 sprays into the nostril(s) as directed by provider Every 12 (Twelve) Hours., Disp: , Rfl:   •  levothyroxine (SYNTHROID, LEVOTHROID) 50 MCG tablet, Take 50 mcg by mouth Daily., Disp: , Rfl:   •  lisinopril (PRINIVIL,ZESTRIL) 5 MG tablet, Take 5 mg by mouth Daily., Disp: , Rfl:   •  LYCOPENE PO, Take 80 mg by mouth Daily., Disp: , Rfl:   •  metoprolol succinate XL (TOPROL-XL) 25 MG 24 hr tablet, Take 25 mg by mouth Every Morning., Disp: , Rfl:   •  Multiple Vitamin (MULTI-VITAMIN DAILY PO), Take 1 tablet by mouth Daily., Disp: , Rfl:   •  pantoprazole (PROTONIX) 40 MG EC tablet, Take 40 mg by mouth Every Night., Disp: , Rfl:   •  pitavastatin calcium (LIVALO) 2 MG tablet tablet, Take  by mouth Every Night., Disp: , Rfl:   •  Probiotic Product (PROBIOTIC ADVANCED PO), Take 2 capsules by mouth Daily., Disp: , Rfl:   •  rosuvastatin (CRESTOR)  "20 MG tablet, Take 10 mg by mouth 3 (Three) Times a Week., Disp: , Rfl:   •  sildenafil (REVATIO) 20 MG tablet, Take 20 mg by mouth As Needed., Disp: , Rfl:       Objective:     Vitals:    08/26/21 0932   BP: 118/68   Pulse: 60   Weight: 108 kg (237 lb 12.8 oz)   Height: 195.6 cm (77\")     Body mass index is 28.2 kg/m².    PHYSICAL EXAM:    Neck:      Vascular: No JVD.   Pulmonary:      Effort: Pulmonary effort is normal.      Breath sounds: Normal breath sounds.   Cardiovascular:      Normal rate. Irregular rhythm.      Murmurs: There is a high frequency blowing holosystolic murmur at the apex.      No gallop. No click. No rub.   Pulses:     Intact distal pulses.           ECG 12 Lead    Date/Time: 8/26/2021 9:49 AM  Performed by: Robyn Gunderson APRN  Authorized by: Robyn Gunderson APRN   Comparison: compared with previous ECG from 8/26/2021  Similar to previous ECG  Rhythm: atrial fibrillation  Rate: normal  BPM: 60  Other findings: non-specific ST-T wave changes    Clinical impression: abnormal EKG  Comments: Indication: Atrial fibrillation              Assessment:       Diagnosis Plan   1. Persistent atrial fibrillation (CMS/HCC)  ECG 12 Lead   2. Dilated cardiomyopathy (CMS/HCC)  Case Request Cath Lab: Coronary angiography   3. Non-rheumatic mitral regurgitation     4. Other chest pain  Case Request Cath Lab: Coronary angiography   5. Other hyperlipidemia     6. Syncope and collapse       Orders Placed This Encounter   Procedures   • ECG 12 Lead     This order was created via procedure documentation     Order Specific Question:   Release to patient     Answer:   Immediate          Plan:       1.  Atrial fibrillation.  He is rate controlled with metoprolol.  No longer on anticoagulation due to side effects.        2.  Dilated cardiomyopathy.  EF 43%.  He has a known cardiomyopathy for which he has been managed medically.  He is on guideline directed medical therapy with lisinopril and Toprol.  He " appears euvolemic on exam and denies any symptoms of acute heart failure.      3.  Mitral regurgitation.  Moderate on echocardiogram from 2020.      4.  Chest pain/shortness of breath.  I am concerned about his symptoms.  To me, they are classic anginal symptoms.  He has never undergone an ischemic evaluation and he has a known cardiomyopathy.  I discussed with Dr. Kelsey.  We will schedule him for cardiac catheterization on Monday.      5.  Fall/syncope.  His fall sounds highly suspicious for syncope.  He has no memory of the event.  He is unsure if he lost consciousness.  He denies any symptoms leading up to this.  At the very least, he is going to need a monitor.  However, given that he is having a cath on Monday, I think it can wait.      6.  Hyperlipidemia.  Lipid panel from June revealed an LDL of 152 and an HDL of 36.  Evidently he has been intolerant to multiple statins.  Most recently he tried rosuvastatin.  However, it was starting to give him problems as well.  His PCP has recommended Livalo.  I think this is appropriate.  Certainly, if he is intolerant to Livalo, we should consider a PCSK9 inhibitor.  We may end up needing to do this anyway.      Further recommendations will be made on Monday.      As always, it has been a pleasure to participate in your patient's care.      Sincerely,         CAROL Braun

## 2021-08-27 ENCOUNTER — LAB (OUTPATIENT)
Dept: LAB | Facility: HOSPITAL | Age: 76
End: 2021-08-27

## 2021-08-27 ENCOUNTER — TELEPHONE (OUTPATIENT)
Dept: CARDIOLOGY | Facility: CLINIC | Age: 76
End: 2021-08-27

## 2021-08-27 ENCOUNTER — TRANSCRIBE ORDERS (OUTPATIENT)
Dept: SLEEP MEDICINE | Facility: HOSPITAL | Age: 76
End: 2021-08-27

## 2021-08-27 DIAGNOSIS — Z01.818 OTHER SPECIFIED PRE-OPERATIVE EXAMINATION: ICD-10-CM

## 2021-08-27 DIAGNOSIS — Z01.818 OTHER SPECIFIED PRE-OPERATIVE EXAMINATION: Primary | ICD-10-CM

## 2021-08-27 DIAGNOSIS — Z13.6 SCREENING FOR CARDIOVASCULAR CONDITION: ICD-10-CM

## 2021-08-27 DIAGNOSIS — Z01.810 PREPROCEDURAL CARDIOVASCULAR EXAMINATION: ICD-10-CM

## 2021-08-27 LAB
ANION GAP SERPL CALCULATED.3IONS-SCNC: 11.1 MMOL/L (ref 5–15)
BASOPHILS # BLD AUTO: 0.07 10*3/MM3 (ref 0–0.2)
BASOPHILS NFR BLD AUTO: 1.2 % (ref 0–1.5)
BUN SERPL-MCNC: 34 MG/DL (ref 8–23)
BUN/CREAT SERPL: 22.2 (ref 7–25)
CALCIUM SPEC-SCNC: 9.3 MG/DL (ref 8.6–10.5)
CHLORIDE SERPL-SCNC: 103 MMOL/L (ref 98–107)
CO2 SERPL-SCNC: 25.9 MMOL/L (ref 22–29)
CREAT SERPL-MCNC: 1.53 MG/DL (ref 0.76–1.27)
DEPRECATED RDW RBC AUTO: 42.1 FL (ref 37–54)
EOSINOPHIL # BLD AUTO: 0.06 10*3/MM3 (ref 0–0.4)
EOSINOPHIL NFR BLD AUTO: 1 % (ref 0.3–6.2)
ERYTHROCYTE [DISTWIDTH] IN BLOOD BY AUTOMATED COUNT: 13 % (ref 12.3–15.4)
GFR SERPL CREATININE-BSD FRML MDRD: 44 ML/MIN/1.73
GLUCOSE SERPL-MCNC: 112 MG/DL (ref 65–99)
HCT VFR BLD AUTO: 38.7 % (ref 37.5–51)
HGB BLD-MCNC: 13 G/DL (ref 13–17.7)
IMM GRANULOCYTES # BLD AUTO: 0.02 10*3/MM3 (ref 0–0.05)
IMM GRANULOCYTES NFR BLD AUTO: 0.3 % (ref 0–0.5)
LYMPHOCYTES # BLD AUTO: 1.81 10*3/MM3 (ref 0.7–3.1)
LYMPHOCYTES NFR BLD AUTO: 30.6 % (ref 19.6–45.3)
MCH RBC QN AUTO: 30 PG (ref 26.6–33)
MCHC RBC AUTO-ENTMCNC: 33.6 G/DL (ref 31.5–35.7)
MCV RBC AUTO: 89.4 FL (ref 79–97)
MONOCYTES # BLD AUTO: 0.43 10*3/MM3 (ref 0.1–0.9)
MONOCYTES NFR BLD AUTO: 7.3 % (ref 5–12)
NEUTROPHILS NFR BLD AUTO: 3.53 10*3/MM3 (ref 1.7–7)
NEUTROPHILS NFR BLD AUTO: 59.6 % (ref 42.7–76)
NRBC BLD AUTO-RTO: 0 /100 WBC (ref 0–0.2)
PLATELET # BLD AUTO: 240 10*3/MM3 (ref 140–450)
PMV BLD AUTO: 9.9 FL (ref 6–12)
POTASSIUM SERPL-SCNC: 5.2 MMOL/L (ref 3.5–5.2)
RBC # BLD AUTO: 4.33 10*6/MM3 (ref 4.14–5.8)
SARS-COV-2 ORF1AB RESP QL NAA+PROBE: NOT DETECTED
SODIUM SERPL-SCNC: 140 MMOL/L (ref 136–145)
WBC # BLD AUTO: 5.92 10*3/MM3 (ref 3.4–10.8)

## 2021-08-27 PROCEDURE — U0004 COV-19 TEST NON-CDC HGH THRU: HCPCS

## 2021-08-27 PROCEDURE — 80048 BASIC METABOLIC PNL TOTAL CA: CPT

## 2021-08-27 PROCEDURE — U0005 INFEC AGEN DETEC AMPLI PROBE: HCPCS

## 2021-08-27 PROCEDURE — C9803 HOPD COVID-19 SPEC COLLECT: HCPCS

## 2021-08-27 PROCEDURE — 85025 COMPLETE CBC W/AUTO DIFF WBC: CPT

## 2021-08-27 PROCEDURE — 36415 COLL VENOUS BLD VENIPUNCTURE: CPT

## 2021-08-27 NOTE — TELEPHONE ENCOUNTER
Jean Pierre Kelsey MD Bishop, Rita, MA  Labs looks ok           Associated Results    Contains abnormal data Basic Metabolic Panel  Order: [id number]  Status:  Final result   Visible to patient:  Yes ([facility]) Dx:  Screening for cardiovascular conditio...  Specimen Information: Blood         1 Result Note  Component   Ref Range & Units 10:37 2 mo ago   Glucose   65 - 99 mg/dL 112High   100High  R    BUN   8 - 23 mg/dL 34High   31High  R    Creatinine   0.76 - 1.27 mg/dL 1.53High   1.6High  R    Sodium   136 - 145 mmol/L 140  140 R    Potassium   3.5 - 5.2 mmol/L 5.2  5.4High  R    Chloride   98 - 107 mmol/L 103  103    CO2   22.0 - 29.0 mmol/L 25.9     Calcium   8.6 - 10.5 mg/dL 9.3  8.9 R    eGFR Non African Amer   >60 mL/min/1.73 44Low      BUN/Creatinine Ratio   7.0 - 25.0 22.2  20.0 R    Anion Gap   5.0 - 15.0 mmol/L 11.1     Resulting Agency  [facility]      Narrative  Performed by:  [facility]  GFR Normal >60   Chronic Kidney Disease <60   Kidney Failure <15       Specimen Collected: 08/27/21 10:37 Last Resulted: 08/27/21 11:21         Lab Flowsheet      Order Details      View Encounter      Lab and Collection Details      Routing      Result History        R=Reference range differs from displayed range        Result Care Coordination      Result Notes     Jean Pierre Kelsey MD   8/27/2021  3:20 PM EDT Back to Top      Labs looks ok            CBC Auto Differential  Order: [id number] - Part of Panel Order [id number]  Status:  Final result   Visible to patient:  Yes (Atiya) Dx:  Screening for cardiovascular conditio...  Specimen Information: Blood         1 Result Note  Component   Ref Range & Units 10:37 2 mo ago   WBC   3.40 - 10.80 10*3/mm3 5.92  5.24 R    RBC   4.14 - 5.80 10*6/mm3 4.33  4.42Low  R    Hemoglobin   13.0 - 17.7 g/dL 13.0  13.5 R    Hematocrit   37.5 - 51.0 % 38.7  42.7 R    MCV   79.0 - 97.0 fL 89.4  96.6 R    MCH   26.6 - 33.0 pg 30.0  30.5 R    MCHC   31.5 - 35.7  g/dL 33.6  31.6 R    RDW   12.3 - 15.4 % 13.0  13.6 R    RDW-SD   37.0 - 54.0 fl 42.1     MPV   6.0 - 12.0 fL 9.9  11.2 R    Platelets   140 - 450 10*3/mm3 240  228 R    Neutrophil %   42.7 - 76.0 % 59.6  50.6 R    Lymphocyte %   19.6 - 45.3 % 30.6  37.2 R    Monocyte %   5.0 - 12.0 % 7.3  9.2 R    Eosinophil %   0.3 - 6.2 % 1.0  1.5 R    Basophil %   0.0 - 1.5 % 1.2  1.3 R    Immature Grans %   0.0 - 0.5 % 0.3  0.2 R    Neutrophils, Absolute   1.70 - 7.00 10*3/mm3 3.53  2.65 R    Lymphocytes, Absolute   0.70 - 3.10 10*3/mm3 1.81  1.95 R    Monocytes, Absolute   0.10 - 0.90 10*3/mm3 0.43  0.48 R    Eosinophils, Absolute   0.00 - 0.40 10*3/mm3 0.06  0.08 R    Basophils, Absolute   0.00 - 0.20 10*3/mm3 0.07  0.07 R    Immature Grans, Absolute   0.00 - 0.05 10*3/mm3 0.02  0.01 R    nRBC   0.0 - 0.2 /100 WBC 0.0  0 R    Resulting Agency [facility]         Specimen Collected: 08/27/21 10:37 Last Resulted: 08/27/21 10:55         Lab Flowsheet      Order Details      View Encounter      Lab and Collection Details      Routing      Result History        R=Reference range differs from displayed range

## 2021-08-30 ENCOUNTER — HOSPITAL ENCOUNTER (OUTPATIENT)
Facility: HOSPITAL | Age: 76
Setting detail: HOSPITAL OUTPATIENT SURGERY
Discharge: HOME | End: 2021-08-30
Attending: INTERNAL MEDICINE | Admitting: INTERNAL MEDICINE

## 2021-08-30 VITALS
SYSTOLIC BLOOD PRESSURE: 143 MMHG | RESPIRATION RATE: 16 BRPM | BODY MASS INDEX: 27.16 KG/M2 | HEIGHT: 77 IN | OXYGEN SATURATION: 99 % | HEART RATE: 53 BPM | TEMPERATURE: 98.2 F | DIASTOLIC BLOOD PRESSURE: 91 MMHG | WEIGHT: 230 LBS

## 2021-08-30 DIAGNOSIS — I42.0 DILATED CARDIOMYOPATHY (HCC): ICD-10-CM

## 2021-08-30 DIAGNOSIS — R07.89 OTHER CHEST PAIN: ICD-10-CM

## 2021-08-30 PROCEDURE — 25010000002 FENTANYL CITRATE (PF) 50 MCG/ML SOLUTION: Performed by: INTERNAL MEDICINE

## 2021-08-30 PROCEDURE — 93458 L HRT ARTERY/VENTRICLE ANGIO: CPT | Performed by: INTERNAL MEDICINE

## 2021-08-30 PROCEDURE — C1769 GUIDE WIRE: HCPCS | Performed by: INTERNAL MEDICINE

## 2021-08-30 PROCEDURE — 25010000002 MIDAZOLAM PER 1 MG: Performed by: INTERNAL MEDICINE

## 2021-08-30 PROCEDURE — 99152 MOD SED SAME PHYS/QHP 5/>YRS: CPT | Performed by: INTERNAL MEDICINE

## 2021-08-30 PROCEDURE — 0 IOPAMIDOL PER 1 ML: Performed by: INTERNAL MEDICINE

## 2021-08-30 PROCEDURE — C1894 INTRO/SHEATH, NON-LASER: HCPCS | Performed by: INTERNAL MEDICINE

## 2021-08-30 PROCEDURE — 25010000002 HEPARIN (PORCINE) PER 1000 UNITS: Performed by: INTERNAL MEDICINE

## 2021-08-30 RX ORDER — SODIUM CHLORIDE 0.9 % (FLUSH) 0.9 %
3 SYRINGE (ML) INJECTION EVERY 12 HOURS SCHEDULED
Status: DISCONTINUED | OUTPATIENT
Start: 2021-08-30 | End: 2021-08-30 | Stop reason: HOSPADM

## 2021-08-30 RX ORDER — FENTANYL CITRATE 50 UG/ML
INJECTION, SOLUTION INTRAMUSCULAR; INTRAVENOUS AS NEEDED
Status: DISCONTINUED | OUTPATIENT
Start: 2021-08-30 | End: 2021-08-30 | Stop reason: HOSPADM

## 2021-08-30 RX ORDER — SODIUM CHLORIDE 0.9 % (FLUSH) 0.9 %
10 SYRINGE (ML) INJECTION AS NEEDED
Status: DISCONTINUED | OUTPATIENT
Start: 2021-08-30 | End: 2021-08-30 | Stop reason: HOSPADM

## 2021-08-30 RX ORDER — SODIUM CHLORIDE 9 MG/ML
75 INJECTION, SOLUTION INTRAVENOUS CONTINUOUS
Status: DISCONTINUED | OUTPATIENT
Start: 2021-08-30 | End: 2021-08-30 | Stop reason: HOSPADM

## 2021-08-30 RX ORDER — MIDAZOLAM HYDROCHLORIDE 1 MG/ML
INJECTION INTRAMUSCULAR; INTRAVENOUS AS NEEDED
Status: DISCONTINUED | OUTPATIENT
Start: 2021-08-30 | End: 2021-08-30 | Stop reason: HOSPADM

## 2021-08-30 RX ORDER — LANOLIN ALCOHOL/MO/W.PET/CERES
2000 CREAM (GRAM) TOPICAL DAILY
COMMUNITY

## 2021-08-30 RX ORDER — ACETAMINOPHEN 325 MG/1
650 TABLET ORAL EVERY 4 HOURS PRN
Status: DISCONTINUED | OUTPATIENT
Start: 2021-08-30 | End: 2021-08-30 | Stop reason: HOSPADM

## 2021-08-30 RX ORDER — LIDOCAINE HYDROCHLORIDE 10 MG/ML
0.1 INJECTION, SOLUTION EPIDURAL; INFILTRATION; INTRACAUDAL; PERINEURAL ONCE AS NEEDED
Status: DISCONTINUED | OUTPATIENT
Start: 2021-08-30 | End: 2021-08-30 | Stop reason: HOSPADM

## 2021-08-30 RX ORDER — LIDOCAINE HYDROCHLORIDE 20 MG/ML
INJECTION, SOLUTION INFILTRATION; PERINEURAL AS NEEDED
Status: DISCONTINUED | OUTPATIENT
Start: 2021-08-30 | End: 2021-08-30 | Stop reason: HOSPADM

## 2021-08-30 RX ADMIN — SODIUM CHLORIDE 75 ML/HR: 9 INJECTION, SOLUTION INTRAVENOUS at 07:35

## 2021-09-03 ENCOUNTER — OFFICE VISIT (OUTPATIENT)
Dept: CARDIAC SURGERY | Facility: CLINIC | Age: 76
End: 2021-09-03

## 2021-09-03 ENCOUNTER — PREP FOR SURGERY (OUTPATIENT)
Dept: OTHER | Facility: HOSPITAL | Age: 76
End: 2021-09-03

## 2021-09-03 VITALS
TEMPERATURE: 97.6 F | HEIGHT: 75 IN | OXYGEN SATURATION: 97 % | SYSTOLIC BLOOD PRESSURE: 143 MMHG | DIASTOLIC BLOOD PRESSURE: 83 MMHG | WEIGHT: 230 LBS | HEART RATE: 69 BPM | BODY MASS INDEX: 28.6 KG/M2 | RESPIRATION RATE: 20 BRPM

## 2021-09-03 DIAGNOSIS — E78.49 OTHER HYPERLIPIDEMIA: ICD-10-CM

## 2021-09-03 DIAGNOSIS — N18.30 CHRONIC RENAL INSUFFICIENCY, STAGE 3 (MODERATE) (HCC): ICD-10-CM

## 2021-09-03 DIAGNOSIS — R79.9 ABNORMAL FINDING OF BLOOD CHEMISTRY, UNSPECIFIED: ICD-10-CM

## 2021-09-03 DIAGNOSIS — I34.0 NON-RHEUMATIC MITRAL REGURGITATION: ICD-10-CM

## 2021-09-03 DIAGNOSIS — I79.8 OTHER DISORDERS OF ARTERIES, ARTERIOLES AND CAPILLARIES IN DISEASES CLASSIFIED ELSEWHERE (HCC): ICD-10-CM

## 2021-09-03 DIAGNOSIS — R79.1 ABNORMAL COAGULATION PROFILE: ICD-10-CM

## 2021-09-03 DIAGNOSIS — C61 PROSTATE CANCER (HCC): ICD-10-CM

## 2021-09-03 DIAGNOSIS — I48.19 PERSISTENT ATRIAL FIBRILLATION (HCC): ICD-10-CM

## 2021-09-03 DIAGNOSIS — I25.118 CORONARY ARTERY DISEASE OF NATIVE ARTERY OF NATIVE HEART WITH STABLE ANGINA PECTORIS (HCC): ICD-10-CM

## 2021-09-03 DIAGNOSIS — I42.9 CARDIOMYOPATHY, UNSPECIFIED TYPE (HCC): ICD-10-CM

## 2021-09-03 DIAGNOSIS — I42.0 DILATED CARDIOMYOPATHY (HCC): Primary | ICD-10-CM

## 2021-09-03 DIAGNOSIS — Z98.890 S/P BILATERAL CAROTID ENDARTERECTOMY: ICD-10-CM

## 2021-09-03 DIAGNOSIS — I25.118 CORONARY ARTERY DISEASE OF NATIVE ARTERY OF NATIVE HEART WITH STABLE ANGINA PECTORIS (HCC): Primary | ICD-10-CM

## 2021-09-03 PROCEDURE — 99024 POSTOP FOLLOW-UP VISIT: CPT | Performed by: THORACIC SURGERY (CARDIOTHORACIC VASCULAR SURGERY)

## 2021-09-03 RX ORDER — CEFAZOLIN SODIUM 2 G/100ML
2 INJECTION, SOLUTION INTRAVENOUS
Status: CANCELLED | OUTPATIENT
Start: 2021-09-04 | End: 2021-09-05

## 2021-09-03 RX ORDER — CHLORHEXIDINE GLUCONATE 500 MG/1
1 CLOTH TOPICAL EVERY 12 HOURS PRN
Status: CANCELLED | OUTPATIENT
Start: 2021-09-03

## 2021-09-03 RX ORDER — CHLORHEXIDINE GLUCONATE 0.12 MG/ML
15 RINSE ORAL EVERY 12 HOURS
Status: CANCELLED | OUTPATIENT
Start: 2021-09-03 | End: 2021-09-04

## 2021-09-03 RX ORDER — CHLORHEXIDINE GLUCONATE 0.12 MG/ML
15 RINSE ORAL ONCE
Status: CANCELLED | OUTPATIENT
Start: 2021-09-03 | End: 2021-09-03

## 2021-09-03 NOTE — PROGRESS NOTES
I am seeing him back today for further discussion about his surgery.  The original note.  He has coronary artery disease and mitral incompetence with persistent atrial fibrillation.  He has tried DOAC's before but had reactions.  He has not ever taken Coumadin.  I spent a long time discussing the procedure with him and he feels satisfied and will proceed with surgery on September 14.  We will plan a CABG, mitral valve repair, closure of left atrial appendage and cryo-maze procedure.  There is a 50% chance we can get him into regular rhythm.  Close in the appendage with resection of the appendage will reduce his risk of stroke postoperatively.  I discussed all this and all risks and options with him and he understands and wishes to proceed.

## 2021-09-07 ENCOUNTER — TRANSCRIBE ORDERS (OUTPATIENT)
Dept: PREADMISSION TESTING | Facility: HOSPITAL | Age: 76
End: 2021-09-07

## 2021-09-07 ENCOUNTER — TELEPHONE (OUTPATIENT)
Dept: CARDIAC SURGERY | Facility: CLINIC | Age: 76
End: 2021-09-07

## 2021-09-07 NOTE — TELEPHONE ENCOUNTER
Spoke to patient with PAT and surgery times. PAT is on 9- at 0900. Surgery is on 9- at 0730 with an 0500 arrival time. He expressed a verbal understanding of these instructions. He was instructed to call the office with any further questions.

## 2021-09-10 ENCOUNTER — HOSPITAL ENCOUNTER (OUTPATIENT)
Dept: GENERAL RADIOLOGY | Facility: HOSPITAL | Age: 76
Discharge: HOME | End: 2021-09-10

## 2021-09-10 ENCOUNTER — ANESTHESIA EVENT (OUTPATIENT)
Dept: PERIOP | Facility: HOSPITAL | Age: 76
End: 2021-09-10

## 2021-09-10 ENCOUNTER — HOSPITAL ENCOUNTER (OUTPATIENT)
Dept: CARDIOLOGY | Facility: HOSPITAL | Age: 76
Discharge: HOME | End: 2021-09-10

## 2021-09-10 ENCOUNTER — PRE-ADMISSION TESTING (OUTPATIENT)
Dept: PREADMISSION TESTING | Facility: HOSPITAL | Age: 76
End: 2021-09-10

## 2021-09-10 VITALS
OXYGEN SATURATION: 99 % | RESPIRATION RATE: 20 BRPM | HEIGHT: 77 IN | SYSTOLIC BLOOD PRESSURE: 162 MMHG | TEMPERATURE: 97.5 F | DIASTOLIC BLOOD PRESSURE: 86 MMHG | BODY MASS INDEX: 27.87 KG/M2 | WEIGHT: 236 LBS | HEART RATE: 79 BPM

## 2021-09-10 DIAGNOSIS — I79.8 OTHER DISORDERS OF ARTERIES, ARTERIOLES AND CAPILLARIES IN DISEASES CLASSIFIED ELSEWHERE (HCC): ICD-10-CM

## 2021-09-10 DIAGNOSIS — I25.118 CORONARY ARTERY DISEASE OF NATIVE ARTERY OF NATIVE HEART WITH STABLE ANGINA PECTORIS (HCC): ICD-10-CM

## 2021-09-10 DIAGNOSIS — R79.9 ABNORMAL FINDING OF BLOOD CHEMISTRY, UNSPECIFIED: ICD-10-CM

## 2021-09-10 DIAGNOSIS — I42.9 CARDIOMYOPATHY, UNSPECIFIED TYPE (HCC): ICD-10-CM

## 2021-09-10 DIAGNOSIS — R79.1 ABNORMAL COAGULATION PROFILE: ICD-10-CM

## 2021-09-10 LAB
ABO GROUP BLD: NORMAL
ALBUMIN SERPL-MCNC: 4.3 G/DL (ref 3.5–5.2)
ALBUMIN/GLOB SERPL: 1.7 G/DL
ALP SERPL-CCNC: 79 U/L (ref 39–117)
ALT SERPL W P-5'-P-CCNC: 16 U/L (ref 1–41)
ANION GAP SERPL CALCULATED.3IONS-SCNC: 11.1 MMOL/L (ref 5–15)
APTT PPP: 26 SECONDS (ref 22.7–35.4)
ARTERIAL PATENCY WRIST A: ABNORMAL
AST SERPL-CCNC: 17 U/L (ref 1–40)
ATMOSPHERIC PRESS: 755.8 MMHG
BACTERIA UR QL AUTO: NORMAL /HPF
BASE EXCESS BLDA CALC-SCNC: -1.5 MMOL/L (ref 0–2)
BASOPHILS # BLD AUTO: 0.05 10*3/MM3 (ref 0–0.2)
BASOPHILS NFR BLD AUTO: 1 % (ref 0–1.5)
BDY SITE: ABNORMAL
BH CV XLRA MEAS - DIST GSV CALF DIST LEFT: 0.13 CM
BH CV XLRA MEAS - DIST GSV CALF DIST RIGHT: 0.25 CM
BH CV XLRA MEAS - DIST GSV THIGH DIST LEFT: 0.28 CM
BH CV XLRA MEAS - DIST GSV THIGH DIST RIGHT: 0.21 CM
BH CV XLRA MEAS - DIST LSV CALF DIST LEFT: 0.2 CM
BH CV XLRA MEAS - DIST LSV CALF DIST RIGHT: 0.15 CM
BH CV XLRA MEAS - GSV ANKLE DIST LEFT: 0.17 CM
BH CV XLRA MEAS - GSV ANKLE DIST RIGHT: 0.26 CM
BH CV XLRA MEAS - GSV KNEE DIST LEFT: 0.17 CM
BH CV XLRA MEAS - GSV KNEE DIST RIGHT: 0.22 CM
BH CV XLRA MEAS - GSV ORIGIN DIST LEFT: 0.71 CM
BH CV XLRA MEAS - GSV ORIGIN DIST RIGHT: 0.82 CM
BH CV XLRA MEAS - MID GSV CALF LEFT: 0.16 CM
BH CV XLRA MEAS - MID GSV CALF RIGHT: 0.28 CM
BH CV XLRA MEAS - MID GSV THIGH  LEFT: 0.22 CM
BH CV XLRA MEAS - MID GSV THIGH  RIGHT: 0.28 CM
BH CV XLRA MEAS - MID LSV CALF DIST LEFT: 0.19 CM
BH CV XLRA MEAS - MID LSV CALF DIST RIGHT: 0.18 CM
BH CV XLRA MEAS - PROX GSV CALF DIST LEFT: 0.17 CM
BH CV XLRA MEAS - PROX GSV CALF DIST RIGHT: 0.24 CM
BH CV XLRA MEAS - PROX GSV THIGH  LEFT: 0.37 CM
BH CV XLRA MEAS - PROX GSV THIGH  RIGHT: 0.35 CM
BH CV XLRA MEAS - PROX LSV CALF DIST LEFT: 0.12 CM
BH CV XLRA MEAS - PROX LSV CALF DIST RIGHT: 0.29 CM
BH CV XLRA MEAS LEFT DIST CCA EDV: 17.3 CM/SEC
BH CV XLRA MEAS LEFT DIST CCA PSV: 95.9 CM/SEC
BH CV XLRA MEAS LEFT DIST ICA EDV: -23.2 CM/SEC
BH CV XLRA MEAS LEFT DIST ICA PSV: -62 CM/SEC
BH CV XLRA MEAS LEFT ICA/CCA RATIO: 0.74
BH CV XLRA MEAS LEFT MID ICA EDV: -24 CM/SEC
BH CV XLRA MEAS LEFT MID ICA PSV: -70.4 CM/SEC
BH CV XLRA MEAS LEFT PROX CCA EDV: 11 CM/SEC
BH CV XLRA MEAS LEFT PROX CCA PSV: 96.6 CM/SEC
BH CV XLRA MEAS LEFT PROX ECA EDV: -7 CM/SEC
BH CV XLRA MEAS LEFT PROX ECA PSV: -69.2 CM/SEC
BH CV XLRA MEAS LEFT PROX ICA EDV: -21.1 CM/SEC
BH CV XLRA MEAS LEFT PROX ICA PSV: -61.6 CM/SEC
BH CV XLRA MEAS LEFT PROX SCLA EDV: 0 CM/SEC
BH CV XLRA MEAS LEFT PROX SCLA PSV: 112 CM/SEC
BH CV XLRA MEAS LEFT VERTEBRAL A EDV: -15.3 CM/SEC
BH CV XLRA MEAS LEFT VERTEBRAL A PSV: -51.9 CM/SEC
BH CV XLRA MEAS RIGHT DIST CCA EDV: 10.6 CM/SEC
BH CV XLRA MEAS RIGHT DIST CCA PSV: 72.7 CM/SEC
BH CV XLRA MEAS RIGHT DIST ICA EDV: -22.8 CM/SEC
BH CV XLRA MEAS RIGHT DIST ICA PSV: -55.4 CM/SEC
BH CV XLRA MEAS RIGHT ICA/CCA RATIO: 1.19
BH CV XLRA MEAS RIGHT MID ICA EDV: -30.5 CM/SEC
BH CV XLRA MEAS RIGHT MID ICA PSV: -86.2 CM/SEC
BH CV XLRA MEAS RIGHT PROX CCA EDV: 18.9 CM/SEC
BH CV XLRA MEAS RIGHT PROX CCA PSV: 114 CM/SEC
BH CV XLRA MEAS RIGHT PROX ECA EDV: -8.2 CM/SEC
BH CV XLRA MEAS RIGHT PROX ECA PSV: -65.1 CM/SEC
BH CV XLRA MEAS RIGHT PROX ICA EDV: -26.4 CM/SEC
BH CV XLRA MEAS RIGHT PROX ICA PSV: -79.2 CM/SEC
BH CV XLRA MEAS RIGHT PROX SCLA EDV: 0 CM/SEC
BH CV XLRA MEAS RIGHT PROX SCLA PSV: 114 CM/SEC
BH CV XLRA MEAS RIGHT VERTEBRAL A EDV: -9.8 CM/SEC
BH CV XLRA MEAS RIGHT VERTEBRAL A PSV: -40.1 CM/SEC
BILIRUB SERPL-MCNC: 0.7 MG/DL (ref 0–1.2)
BILIRUB UR QL STRIP: NEGATIVE
BLD GP AB SCN SERPL QL: NEGATIVE
BUN SERPL-MCNC: 32 MG/DL (ref 8–23)
BUN/CREAT SERPL: 22.2 (ref 7–25)
CALCIUM SPEC-SCNC: 9.1 MG/DL (ref 8.6–10.5)
CHLORIDE SERPL-SCNC: 108 MMOL/L (ref 98–107)
CHOLEST SERPL-MCNC: 125 MG/DL (ref 0–200)
CLARITY UR: CLEAR
CLOSE TME COLL+ADP + EPINEP PNL BLD: 98 % (ref 86–100)
CO2 SERPL-SCNC: 23.9 MMOL/L (ref 22–29)
COLOR UR: ABNORMAL
CREAT SERPL-MCNC: 1.44 MG/DL (ref 0.76–1.27)
DEPRECATED RDW RBC AUTO: 41.5 FL (ref 37–54)
EOSINOPHIL # BLD AUTO: 0.12 10*3/MM3 (ref 0–0.4)
EOSINOPHIL NFR BLD AUTO: 2.3 % (ref 0.3–6.2)
ERYTHROCYTE [DISTWIDTH] IN BLOOD BY AUTOMATED COUNT: 12.8 % (ref 12.3–15.4)
GFR SERPL CREATININE-BSD FRML MDRD: 47 ML/MIN/1.73
GLOBULIN UR ELPH-MCNC: 2.6 GM/DL
GLUCOSE SERPL-MCNC: 102 MG/DL (ref 65–99)
GLUCOSE UR STRIP-MCNC: NEGATIVE MG/DL
HBA1C MFR BLD: 5.64 % (ref 4.8–5.6)
HCO3 BLDA-SCNC: 23.1 MMOL/L (ref 22–28)
HCT VFR BLD AUTO: 36.7 % (ref 37.5–51)
HDLC SERPL-MCNC: 38 MG/DL (ref 40–60)
HGB BLD-MCNC: 12.2 G/DL (ref 13–17.7)
HGB UR QL STRIP.AUTO: NEGATIVE
HYALINE CASTS UR QL AUTO: NORMAL /LPF
IMM GRANULOCYTES # BLD AUTO: 0.02 10*3/MM3 (ref 0–0.05)
IMM GRANULOCYTES NFR BLD AUTO: 0.4 % (ref 0–0.5)
INR PPP: 1.02 (ref 0.9–1.1)
KETONES UR QL STRIP: ABNORMAL
LDLC SERPL CALC-MCNC: 65 MG/DL (ref 0–100)
LDLC/HDLC SERPL: 1.64 {RATIO}
LEUKOCYTE ESTERASE UR QL STRIP.AUTO: NEGATIVE
LYMPHOCYTES # BLD AUTO: 1.54 10*3/MM3 (ref 0.7–3.1)
LYMPHOCYTES NFR BLD AUTO: 30.1 % (ref 19.6–45.3)
MAGNESIUM SERPL-MCNC: 2 MG/DL (ref 1.6–2.4)
MAXIMAL PREDICTED HEART RATE: 142 BPM
MCH RBC QN AUTO: 29.7 PG (ref 26.6–33)
MCHC RBC AUTO-ENTMCNC: 33.2 G/DL (ref 31.5–35.7)
MCV RBC AUTO: 89.3 FL (ref 79–97)
MODALITY: ABNORMAL
MONOCYTES # BLD AUTO: 0.43 10*3/MM3 (ref 0.1–0.9)
MONOCYTES NFR BLD AUTO: 8.4 % (ref 5–12)
NEUTROPHILS NFR BLD AUTO: 2.95 10*3/MM3 (ref 1.7–7)
NEUTROPHILS NFR BLD AUTO: 57.8 % (ref 42.7–76)
NITRITE UR QL STRIP: NEGATIVE
NRBC BLD AUTO-RTO: 0 /100 WBC (ref 0–0.2)
NT-PROBNP SERPL-MCNC: 1486 PG/ML (ref 0–1800)
PCO2 BLDA: 37.8 MM HG (ref 35–45)
PH BLDA: 7.39 PH UNITS (ref 7.35–7.45)
PH UR STRIP.AUTO: <=5 [PH] (ref 5–8)
PLATELET # BLD AUTO: 205 10*3/MM3 (ref 140–450)
PMV BLD AUTO: 9.9 FL (ref 6–12)
PO2 BLDA: 80.2 MM HG (ref 80–100)
POTASSIUM SERPL-SCNC: 4.7 MMOL/L (ref 3.5–5.2)
PROT SERPL-MCNC: 6.9 G/DL (ref 6–8.5)
PROT UR QL STRIP: ABNORMAL
PROTHROMBIN TIME: 13.2 SECONDS (ref 11.7–14.2)
QT INTERVAL: 457 MS
RBC # BLD AUTO: 4.11 10*6/MM3 (ref 4.14–5.8)
RBC # UR: NORMAL /HPF
REF LAB TEST METHOD: NORMAL
RH BLD: POSITIVE
SAO2 % BLDCOA: 95.7 % (ref 92–99)
SODIUM SERPL-SCNC: 143 MMOL/L (ref 136–145)
SP GR UR STRIP: 1.03 (ref 1–1.03)
SQUAMOUS #/AREA URNS HPF: NORMAL /HPF
STRESS TARGET HR: 121 BPM
T&S EXPIRATION DATE: NORMAL
TOTAL RATE: 18 BREATHS/MINUTE
TRIGL SERPL-MCNC: 124 MG/DL (ref 0–150)
UROBILINOGEN UR QL STRIP: ABNORMAL
VLDLC SERPL-MCNC: 22 MG/DL (ref 5–40)
WBC # BLD AUTO: 5.11 10*3/MM3 (ref 3.4–10.8)
WBC UR QL AUTO: NORMAL /HPF

## 2021-09-10 PROCEDURE — 83880 ASSAY OF NATRIURETIC PEPTIDE: CPT

## 2021-09-10 PROCEDURE — 71046 X-RAY EXAM CHEST 2 VIEWS: CPT

## 2021-09-10 PROCEDURE — 86850 RBC ANTIBODY SCREEN: CPT

## 2021-09-10 PROCEDURE — 83735 ASSAY OF MAGNESIUM: CPT

## 2021-09-10 PROCEDURE — 80053 COMPREHEN METABOLIC PANEL: CPT

## 2021-09-10 PROCEDURE — 82803 BLOOD GASES ANY COMBINATION: CPT | Performed by: INTERNAL MEDICINE

## 2021-09-10 PROCEDURE — 86900 BLOOD TYPING SEROLOGIC ABO: CPT

## 2021-09-10 PROCEDURE — 93880 EXTRACRANIAL BILAT STUDY: CPT

## 2021-09-10 PROCEDURE — 94799 UNLISTED PULMONARY SVC/PX: CPT

## 2021-09-10 PROCEDURE — 81001 URINALYSIS AUTO W/SCOPE: CPT

## 2021-09-10 PROCEDURE — 83036 HEMOGLOBIN GLYCOSYLATED A1C: CPT

## 2021-09-10 PROCEDURE — 93010 ELECTROCARDIOGRAM REPORT: CPT | Performed by: INTERNAL MEDICINE

## 2021-09-10 PROCEDURE — 93005 ELECTROCARDIOGRAM TRACING: CPT

## 2021-09-10 PROCEDURE — 85730 THROMBOPLASTIN TIME PARTIAL: CPT

## 2021-09-10 PROCEDURE — 85025 COMPLETE CBC W/AUTO DIFF WBC: CPT

## 2021-09-10 PROCEDURE — 86920 COMPATIBILITY TEST SPIN: CPT

## 2021-09-10 PROCEDURE — 36415 COLL VENOUS BLD VENIPUNCTURE: CPT

## 2021-09-10 PROCEDURE — 85610 PROTHROMBIN TIME: CPT

## 2021-09-10 PROCEDURE — 93970 EXTREMITY STUDY: CPT

## 2021-09-10 PROCEDURE — 36600 WITHDRAWAL OF ARTERIAL BLOOD: CPT | Performed by: INTERNAL MEDICINE

## 2021-09-10 PROCEDURE — 86901 BLOOD TYPING SEROLOGIC RH(D): CPT

## 2021-09-10 PROCEDURE — 80061 LIPID PANEL: CPT

## 2021-09-10 PROCEDURE — 85576 BLOOD PLATELET AGGREGATION: CPT

## 2021-09-10 RX ORDER — CHLORHEXIDINE GLUCONATE 500 MG/1
1 CLOTH TOPICAL EVERY 12 HOURS PRN
Status: DISCONTINUED | OUTPATIENT
Start: 2021-09-10 | End: 2021-10-19

## 2021-09-10 RX ORDER — CHLORHEXIDINE GLUCONATE 0.12 MG/ML
15 RINSE ORAL
COMMUNITY
End: 2021-09-22 | Stop reason: HOSPADM

## 2021-09-10 RX ORDER — CHLORHEXIDINE GLUCONATE 0.12 MG/ML
15 RINSE ORAL EVERY 12 HOURS
Status: DISPENSED | OUTPATIENT
Start: 2021-09-10 | End: 2021-09-11

## 2021-09-10 NOTE — DISCHARGE INSTRUCTIONS
Chart check complete    Take the following medications the morning of surgery:  AS DIRECTED PER CARDIOLOGY NURSE    If you are on prescription narcotic pain medication to control your pain you may also take that medication the morning of surgery.    General Instructions: CLEAR LIQUIDS UNTIL 4:00 AM MORNING OF SURGERY  • Do not eat solid food after midnight the night before surgery.  • You may drink clear liquids day of surgery but must stop at least one hour before your hospital arrival time.  • It is beneficial for you to have a clear drink that contains carbohydrates the day of surgery.  We suggest a 12 to 20 ounce bottle of Gatorade or Powerade for non-diabetic patients or a 12 to 20 ounce bottle of G2 or Powerade Zero for diabetic patients. (Pediatric patients, are not advised to drink a 12 to 20 ounce carbohydrate drink)    Clear liquids are liquids you can see through.  Nothing red in color.     Plain water                               Sports drinks  Sodas                                   Gelatin (Jell-O)  Fruit juices without pulp such as white grape juice and apple juice  Popsicles that contain no fruit or yogurt  Tea or coffee (no cream or milk added)  Gatorade / Powerade  G2 / Powerade Zero    • Infants may have breast milk up to four hours before surgery.  • Infants drinking formula may drink formula up to six hours before surgery.   • Patients who avoid smoking, chewing tobacco and alcohol for 4 weeks prior to surgery have a reduced risk of post-operative complications.  Quit smoking as many days before surgery as you can.  • Do not smoke, use chewing tobacco or drink alcohol the day of surgery.   • If applicable bring your C-PAP/ BI-PAP machine.  • Bring any papers given to you in the doctor’s office.  • Wear clean comfortable clothes.  • Do not wear contact lenses, false eyelashes or make-up.  Bring a case for your glasses.   • Bring crutches or walker if applicable.  • Remove all piercings.  Leave jewelry and any other  valuables at home.  • Hair extensions with metal clips must be removed prior to surgery.  • The Pre-Admission Testing nurse will instruct you to bring medications if unable to obtain an accurate list in Pre-Admission Testing.        If you were given a blood bank ID arm band remember to bring it with you the day of surgery.    Preventing a Surgical Site Infection:  • For 2 to 3 days before surgery, avoid shaving with a razor because the razor can irritate skin and make it easier to develop an infection.    • Any areas of open skin can increase the risk of a post-operative wound infection by allowing bacteria to enter and travel throughout the body.  Notify your surgeon if you have any skin wounds / rashes even if it is not near the expected surgical site.  The area will need assessed to determine if surgery should be delayed until it is healed.  • The night prior to surgery shower using a fresh bar of anti-bacterial soap (such as Dial) and clean washcloth.  Sleep in a clean bed with clean clothing.  Do not allow pets to sleep with you.  • Shower on the morning of surgery using a fresh bar of anti-bacterial soap (such as Dial) and clean washcloth.  Dry with a clean towel and dress in clean clothing.  • Ask your surgeon if you will be receiving antibiotics prior to surgery.  • Make sure you, your family, and all healthcare providers clean their hands with soap and water or an alcohol based hand  before caring for you or your wound.    Day of surgery: 9/14/2021 ARRIVAL TIME 5:00 AM  Your arrival time is approximately two hours before your scheduled surgery time.  Upon arrival, a Pre-op nurse and Anesthesiologist will review your health history, obtain vital signs, and answer questions you may have.  The only belongings needed at this time will be a list of your home medications and if applicable your C-PAP/BI-PAP machine.  A Pre-op nurse will start an IV and you may receive medication in preparation for  surgery, including something to help you relax.     Please be aware that surgery does come with discomfort.  We want to make every effort to control your discomfort so please discuss any uncontrolled symptoms with your nurse.   Your doctor will most likely have prescribed pain medications.      If you are going home after surgery you will receive individualized written care instructions before being discharged.  A responsible adult must drive you to and from the hospital on the day of your surgery and stay with you for 24 hours.  Discharge prescriptions can be filled by the hospital pharmacy during regular pharmacy hours.  If you are having surgery late in the day/evening your prescription may be e-prescribed to your pharmacy.  Please verify your pharmacy hours or chose a 24 hour pharmacy to avoid not having access to your prescription because your pharmacy has closed for the day.    If you are staying overnight following surgery, you will be transported to your hospital room following the recovery period.  [facility] has all private rooms.    If you have any questions please call Pre-Admission Testing at [phone].  Deductibles and co-payments are collected on the day of service. Please be prepared to pay the required co-pay, deductible or deposit on the day of service as defined by your plan.    Patient Education for Self-Quarantine Process    • Following your COVID testing, we strongly recommend that you wear a mask when you are with other people and practice social distancing.   • Limit your activities to only required outings.  • Wash your hands with soap and water frequently for at least 20 seconds.   • Avoid touching your eyes, nose and mouth with unwashed hands.  • Do not share anything - utensils, drinking glasses, food from the same bowl.   • Sanitize household surfaces daily. Include all high touch areas (door handles, light switches, phones, countertops, etc.)    Call your surgeon  immediately if you experience any of the following symptoms:  • Sore Throat  • Shortness of Breath or difficulty breathing  • Cough  • Chills  • Body soreness or muscle pain  • Headache  • Fever  • New loss of taste or smell  • Do not arrive for your surgery ill.  Your procedure will need to be rescheduled to another time.  You will need to call your physician before the day of surgery to avoid any unnecessary exposure to hospital staff as well as other patients.    CHLORHEXIDINE CLOTH INSTRUCTIONS  The morning of surgery follow these instructions using the Chlorhexidine cloths you've been given.  These steps reduce bacteria on the body.  Do not use the cloths near your eyes, ears mouth, genitalia or on open wounds.  Throw the cloths away after use but do not try to flush them down a toilet.      • Open and remove one cloth at a time from the package.    • Leave the cloth unfolded and begin the bathing.  • Massage the skin with the cloths using gentle pressure to remove bacteria.  Do not scrub harshly.   • Follow the steps below with one 2% CHG cloth per area (6 total cloths).  • One cloth for neck, shoulders and chest.  • One cloth for both arms, hands, fingers and underarms (do underarms last).  • One cloth for the abdomen followed by groin.  • One cloth for right leg and foot including between the toes.  • One cloth for left leg and foot including between the toes.  • The last cloth is to be used for the back of the neck, back and buttocks.    Allow the CHG to air dry 3 minutes on the skin which will give it time to work and decrease the chance of irritation.  The skin may feel sticky until it is dry.  Do not rinse with water or any other liquid or you will lose the beneficial effects of the CHG.  If mild skin irritation occurs, do rinse the skin to remove the CHG.  Report this to the nurse at time of admission.  Do not apply lotions, creams, ointments, deodorants or perfumes after using the clothes. Dress in  clean clothes before coming to the hospital.    BACTROBAN NASAL OINTMENT  There are many germs normally in your nose. Bactroban is an ointment that will help reduce these germs. Please follow these instructions for Bactroban use:          ____The day before surgery in the evening              Date________    ____The day of surgery in the morning    Date________    **Squirt ½ package of Bactroban Ointment onto a cotton applicator and apply to inside of 1st nostril.  Squirt the remaining Bactroban and apply to the inside of the other nostril.    PERIDEX- ORAL:  Use only if your surgeon has ordered  Use the night before and morning of surgery - Swish, gargle, and spit - do not swallow.

## 2021-09-10 NOTE — ANESTHESIA PREPROCEDURE EVALUATION
Anesthesia Evaluation     Patient summary reviewed and Nursing notes reviewed   NPO Solid Status: > 8 hours  NPO Liquid Status: > 8 hours           Airway   Mallampati: II  Neck ROM: full  No difficulty expected  Comment: G1 DL Mac4/Mil2 previously  Dental - normal exam     Pulmonary     breath sounds clear to auscultation  (+) shortness of breath,   Cardiovascular     ECG reviewed  Rhythm: regular    (+) valvular problems/murmurs MR, CAD, dysrhythmias Paroxysmal Atrial Fib, angina, CHF Systolic <55%, hyperlipidemia,  carotid artery disease    ROS comment: TTE:  · Calculated EF = 43.7%  · Left ventricular systolic function is mildly decreased.  · Left ventricular wall thickness is consistent with mild concentric hypertrophy.  · The following left ventricular wall segments are hypokinetic: basal inferolateral and basal inferior.  · Left atrial cavity size is severely dilated.  · Mild aortic valve regurgitation is present.  · The mitral valve is abnormal in structure. There are myxomatous changes of the mitral valve apparatus present. Mild MAC is present. There is mitral valve prolapse of the posterior mitral leaflet.  · Moderate mitral valve regurgitation is present with an eccentric jet noted. It is difficult to quantitate the amount of mitral regurgitation due to the extreme eccentric jet  · Mild tricuspid valve regurgitation is present.  · Estimated right ventricular systolic pressure from tricuspid regurgitation is mildly elevated (35-45 mmHg).  · Calculated right ventricular systolic pressure from tricuspid regurgitation is 42.7 mmHg.    LHC:  Severe 3v CAD    Neuro/Psych  (+) syncope,     GI/Hepatic/Renal/Endo    (+)  GERD,  renal disease,     Musculoskeletal     Abdominal    Substance History      OB/GYN          Other   arthritis,    history of cancer                  Anesthesia Plan    ASA 4     general   (I have reviewed the patient's history with the patient and the chart, including all pertinent  laboratory results and imaging. I have explained the risks of anesthesia including but not limited to dental damage, corneal abrasion, nerve injury, MI, stroke, and death. Questions asked and answered. Anesthetic plan discussed with patient and team as indicated. Patient expressed understanding of the above.    GETA, arterial line, CVC, PAC, GIRMA)  intravenous induction   Postoperative Plan: Expected vent after surgery  Anesthetic plan, all risks, benefits, and alternatives have been provided, discussed and informed consent has been obtained with: patient.  Use of blood products discussed with patient  Consented to blood products.

## 2021-09-11 ENCOUNTER — LAB (OUTPATIENT)
Dept: LAB | Facility: HOSPITAL | Age: 76
End: 2021-09-11

## 2021-09-11 LAB — SARS-COV-2 ORF1AB RESP QL NAA+PROBE: NOT DETECTED

## 2021-09-11 PROCEDURE — U0005 INFEC AGEN DETEC AMPLI PROBE: HCPCS

## 2021-09-11 PROCEDURE — C9803 HOPD COVID-19 SPEC COLLECT: HCPCS

## 2021-09-11 PROCEDURE — U0004 COV-19 TEST NON-CDC HGH THRU: HCPCS

## 2021-09-14 ENCOUNTER — HOSPITAL ENCOUNTER (INPATIENT)
Facility: HOSPITAL | Age: 76
LOS: 8 days | Discharge: HOME HEALTH | End: 2021-09-22
Attending: THORACIC SURGERY (CARDIOTHORACIC VASCULAR SURGERY) | Admitting: THORACIC SURGERY (CARDIOTHORACIC VASCULAR SURGERY)

## 2021-09-14 ENCOUNTER — ANESTHESIA (OUTPATIENT)
Dept: PERIOP | Facility: HOSPITAL | Age: 76
End: 2021-09-14

## 2021-09-14 ENCOUNTER — APPOINTMENT (OUTPATIENT)
Dept: GENERAL RADIOLOGY | Facility: HOSPITAL | Age: 76
End: 2021-09-14

## 2021-09-14 ENCOUNTER — ANCILLARY PROCEDURE (OUTPATIENT)
Dept: PERIOP | Facility: HOSPITAL | Age: 76
End: 2021-09-14

## 2021-09-14 DIAGNOSIS — I25.118 CORONARY ARTERY DISEASE OF NATIVE ARTERY OF NATIVE HEART WITH STABLE ANGINA PECTORIS (HCC): ICD-10-CM

## 2021-09-14 DIAGNOSIS — Z95.1 S/P CABG (CORONARY ARTERY BYPASS GRAFT): Primary | ICD-10-CM

## 2021-09-14 DIAGNOSIS — Z95.2 S/P MVR (MITRAL VALVE REPLACEMENT): ICD-10-CM

## 2021-09-14 LAB
ABO GROUP BLD: NORMAL
ACT BLD: 114 SECONDS (ref 82–152)
ACT BLD: 120 SECONDS (ref 82–152)
ACT BLD: 307 SECONDS (ref 82–152)
ACT BLD: 362 SECONDS (ref 82–152)
ACT BLD: 367 SECONDS (ref 82–152)
ACT BLD: 400 SECONDS (ref 82–152)
ACT BLD: 417 SECONDS (ref 82–152)
ACT BLD: 477 SECONDS (ref 82–152)
ALBUMIN SERPL-MCNC: 3.5 G/DL (ref 3.5–5.2)
ALBUMIN SERPL-MCNC: 4.2 G/DL (ref 3.5–5.2)
ANION GAP SERPL CALCULATED.3IONS-SCNC: 15.8 MMOL/L (ref 5–15)
ANION GAP SERPL CALCULATED.3IONS-SCNC: 9.6 MMOL/L (ref 5–15)
APTT PPP: 31.7 SECONDS (ref 22.7–35.4)
ARTERIAL PATENCY WRIST A: ABNORMAL
ATMOSPHERIC PRESS: 747.7 MMHG
ATMOSPHERIC PRESS: 748.6 MMHG
ATMOSPHERIC PRESS: 748.7 MMHG
BASE EXCESS BLDA CALC-SCNC: -2 MMOL/L (ref 0–2)
BASE EXCESS BLDA CALC-SCNC: -4 MMOL/L (ref 0–2)
BASE EXCESS BLDA CALC-SCNC: -6.1 MMOL/L (ref 0–2)
BASE EXCESS BLDA CALC-SCNC: 0 MMOL/L (ref -5–5)
BASE EXCESS BLDA CALC-SCNC: 1 MMOL/L (ref -5–5)
BASE EXCESS BLDA CALC-SCNC: 1 MMOL/L (ref -5–5)
BASE EXCESS BLDA CALC-SCNC: 2 MMOL/L (ref -5–5)
BASE EXCESS BLDA CALC-SCNC: 3 MMOL/L (ref -5–5)
BASE EXCESS BLDA CALC-SCNC: 3 MMOL/L (ref -5–5)
BASE EXCESS BLDA CALC-SCNC: 4 MMOL/L (ref -5–5)
BASE EXCESS BLDA CALC-SCNC: 7 MMOL/L (ref -5–5)
BDY SITE: ABNORMAL
BUN SERPL-MCNC: 28 MG/DL (ref 8–23)
BUN SERPL-MCNC: 32 MG/DL (ref 8–23)
BUN/CREAT SERPL: 15.7 (ref 7–25)
BUN/CREAT SERPL: 17.5 (ref 7–25)
CA-I BLD-MCNC: 5.5 MG/DL (ref 4.6–5.4)
CA-I BLDA-SCNC: ABNORMAL MMOL/L
CA-I SERPL ISE-MCNC: 1.38 MMOL/L (ref 1.15–1.35)
CALCIUM SPEC-SCNC: 8.9 MG/DL (ref 8.6–10.5)
CALCIUM SPEC-SCNC: 9.5 MG/DL (ref 8.6–10.5)
CHLORIDE SERPL-SCNC: 104 MMOL/L (ref 98–107)
CHLORIDE SERPL-SCNC: 105 MMOL/L (ref 98–107)
CO2 BLDA-SCNC: 27 MMOL/L (ref 24–29)
CO2 BLDA-SCNC: 27 MMOL/L (ref 24–29)
CO2 BLDA-SCNC: 28 MMOL/L (ref 24–29)
CO2 BLDA-SCNC: 29 MMOL/L (ref 24–29)
CO2 BLDA-SCNC: 30 MMOL/L (ref 24–29)
CO2 BLDA-SCNC: 30 MMOL/L (ref 24–29)
CO2 BLDA-SCNC: 31 MMOL/L (ref 24–29)
CO2 BLDA-SCNC: 35 MMOL/L (ref 24–29)
CO2 SERPL-SCNC: 18.2 MMOL/L (ref 22–29)
CO2 SERPL-SCNC: 20.4 MMOL/L (ref 22–29)
CREAT SERPL-MCNC: 1.78 MG/DL (ref 0.76–1.27)
CREAT SERPL-MCNC: 1.83 MG/DL (ref 0.76–1.27)
DEPRECATED RDW RBC AUTO: 41.9 FL (ref 37–54)
DEPRECATED RDW RBC AUTO: 43.3 FL (ref 37–54)
ERYTHROCYTE [DISTWIDTH] IN BLOOD BY AUTOMATED COUNT: 13.1 % (ref 12.3–15.4)
ERYTHROCYTE [DISTWIDTH] IN BLOOD BY AUTOMATED COUNT: 13.3 % (ref 12.3–15.4)
FIBRINOGEN PPP-MCNC: 295 MG/DL (ref 219–464)
GFR SERPL CREATININE-BSD FRML MDRD: 36 ML/MIN/1.73
GFR SERPL CREATININE-BSD FRML MDRD: 37 ML/MIN/1.73
GLUCOSE BLDC GLUCOMTR-MCNC: 129 MG/DL (ref 70–130)
GLUCOSE BLDC GLUCOMTR-MCNC: 134 MG/DL (ref 70–130)
GLUCOSE BLDC GLUCOMTR-MCNC: 139 MG/DL (ref 70–130)
GLUCOSE BLDC GLUCOMTR-MCNC: 144 MG/DL (ref 70–130)
GLUCOSE BLDC GLUCOMTR-MCNC: 146 MG/DL (ref 70–130)
GLUCOSE BLDC GLUCOMTR-MCNC: 151 MG/DL (ref 70–130)
GLUCOSE BLDC GLUCOMTR-MCNC: 159 MG/DL (ref 70–130)
GLUCOSE BLDC GLUCOMTR-MCNC: 162 MG/DL (ref 70–130)
GLUCOSE BLDC GLUCOMTR-MCNC: 169 MG/DL (ref 70–130)
GLUCOSE BLDC GLUCOMTR-MCNC: 182 MG/DL (ref 70–130)
GLUCOSE BLDC GLUCOMTR-MCNC: 187 MG/DL (ref 70–130)
GLUCOSE BLDC GLUCOMTR-MCNC: 190 MG/DL (ref 70–130)
GLUCOSE BLDC GLUCOMTR-MCNC: 98 MG/DL (ref 70–130)
GLUCOSE SERPL-MCNC: 128 MG/DL (ref 65–99)
GLUCOSE SERPL-MCNC: 142 MG/DL (ref 65–99)
HCO3 BLDA-SCNC: 18.8 MMOL/L (ref 22–28)
HCO3 BLDA-SCNC: 21.3 MMOL/L (ref 22–28)
HCO3 BLDA-SCNC: 21.7 MMOL/L (ref 22–28)
HCO3 BLDA-SCNC: 25.5 MMOL/L (ref 22–26)
HCO3 BLDA-SCNC: 26.2 MMOL/L (ref 22–26)
HCO3 BLDA-SCNC: 26.2 MMOL/L (ref 22–26)
HCO3 BLDA-SCNC: 27.4 MMOL/L (ref 22–26)
HCO3 BLDA-SCNC: 28.3 MMOL/L (ref 22–26)
HCO3 BLDA-SCNC: 28.4 MMOL/L (ref 22–26)
HCO3 BLDA-SCNC: 29.4 MMOL/L (ref 22–26)
HCO3 BLDA-SCNC: 32.8 MMOL/L (ref 22–26)
HCT VFR BLD AUTO: 32.6 % (ref 37.5–51)
HCT VFR BLD AUTO: 35.1 % (ref 37.5–51)
HCT VFR BLDA CALC: 25 % (ref 38–51)
HCT VFR BLDA CALC: 25 % (ref 38–51)
HCT VFR BLDA CALC: 26 % (ref 38–51)
HCT VFR BLDA CALC: 27 % (ref 38–51)
HCT VFR BLDA CALC: 30 % (ref 38–51)
HCT VFR BLDA CALC: 30 % (ref 38–51)
HCT VFR BLDA CALC: 31 % (ref 38–51)
HCT VFR BLDA CALC: 31 % (ref 38–51)
HGB BLD-MCNC: 11.2 G/DL (ref 13–17.7)
HGB BLD-MCNC: 12.2 G/DL (ref 13–17.7)
HGB BLDA-MCNC: 10.2 G/DL (ref 12–17)
HGB BLDA-MCNC: 10.2 G/DL (ref 12–17)
HGB BLDA-MCNC: 10.5 G/DL (ref 12–17)
HGB BLDA-MCNC: 10.5 G/DL (ref 12–17)
HGB BLDA-MCNC: 8.5 G/DL (ref 12–17)
HGB BLDA-MCNC: 8.5 G/DL (ref 12–17)
HGB BLDA-MCNC: 8.8 G/DL (ref 12–17)
HGB BLDA-MCNC: 9.2 G/DL (ref 12–17)
INHALED O2 CONCENTRATION: 100 %
INHALED O2 CONCENTRATION: 40 %
INHALED O2 CONCENTRATION: 40 %
INR PPP: 1.53 (ref 0.9–1.1)
LYMPHOCYTES # BLD MANUAL: 0.2 10*3/MM3 (ref 0.7–3.1)
LYMPHOCYTES NFR BLD MANUAL: 1 % (ref 5–12)
LYMPHOCYTES NFR BLD MANUAL: 3 % (ref 19.6–45.3)
MAGNESIUM SERPL-MCNC: 2.6 MG/DL (ref 1.6–2.4)
MAGNESIUM SERPL-MCNC: 2.7 MG/DL (ref 1.6–2.4)
MCH RBC QN AUTO: 30.4 PG (ref 26.6–33)
MCH RBC QN AUTO: 30.7 PG (ref 26.6–33)
MCHC RBC AUTO-ENTMCNC: 34.4 G/DL (ref 31.5–35.7)
MCHC RBC AUTO-ENTMCNC: 34.8 G/DL (ref 31.5–35.7)
MCV RBC AUTO: 88.2 FL (ref 79–97)
MCV RBC AUTO: 88.6 FL (ref 79–97)
MODALITY: ABNORMAL
MONOCYTES # BLD AUTO: 0.07 10*3/MM3 (ref 0.1–0.9)
NEUTROPHILS # BLD AUTO: 6.28 10*3/MM3 (ref 1.7–7)
NEUTROPHILS NFR BLD MANUAL: 96 % (ref 42.7–76)
O2 A-A PPRESDIFF RESPIRATORY: 0.3 MMHG
O2 A-A PPRESDIFF RESPIRATORY: 0.4 MMHG
O2 A-A PPRESDIFF RESPIRATORY: 0.4 MMHG
PCO2 BLDA: 30.6 MM HG (ref 35–45)
PCO2 BLDA: 34.1 MM HG (ref 35–45)
PCO2 BLDA: 39.9 MM HG (ref 35–45)
PCO2 BLDA: 41.5 MM HG (ref 35–45)
PCO2 BLDA: 44.8 MM HG (ref 35–45)
PCO2 BLDA: 47.4 MM HG (ref 35–45)
PCO2 BLDA: 48.2 MM HG (ref 35–45)
PCO2 BLDA: 51.7 MM HG (ref 35–45)
PCO2 BLDA: 52.5 MM HG (ref 35–45)
PCO2 BLDA: 53.1 MM HG (ref 35–45)
PCO2 BLDA: 58.8 MM HG (ref 35–45)
PEEP RESPIRATORY: 7.5 CM[H2O]
PH BLDA: 7.33 PH UNITS (ref 7.35–7.45)
PH BLDA: 7.33 PH UNITS (ref 7.35–7.6)
PH BLDA: 7.34 PH UNITS (ref 7.35–7.6)
PH BLDA: 7.35 PH UNITS (ref 7.35–7.45)
PH BLDA: 7.35 PH UNITS (ref 7.35–7.6)
PH BLDA: 7.35 PH UNITS (ref 7.35–7.6)
PH BLDA: 7.36 PH UNITS (ref 7.35–7.6)
PH BLDA: 7.36 PH UNITS (ref 7.35–7.6)
PH BLDA: 7.38 PH UNITS (ref 7.35–7.6)
PH BLDA: 7.42 PH UNITS (ref 7.35–7.6)
PH BLDA: 7.45 PH UNITS (ref 7.35–7.45)
PHOSPHATE SERPL-MCNC: 2.8 MG/DL (ref 2.5–4.5)
PHOSPHATE SERPL-MCNC: 3 MG/DL (ref 2.5–4.5)
PLAT MORPH BLD: NORMAL
PLATELET # BLD AUTO: 110 10*3/MM3 (ref 140–450)
PLATELET # BLD AUTO: 110 10*3/MM3 (ref 140–450)
PMV BLD AUTO: 10.1 FL (ref 6–12)
PMV BLD AUTO: 10.4 FL (ref 6–12)
PO2 BLDA: 244 MMHG (ref 80–105)
PO2 BLDA: 285.2 MM HG (ref 80–100)
PO2 BLDA: 360 MMHG (ref 80–105)
PO2 BLDA: 444 MMHG (ref 80–105)
PO2 BLDA: 45 MMHG (ref 80–105)
PO2 BLDA: 462 MMHG (ref 80–105)
PO2 BLDA: 463 MMHG (ref 80–105)
PO2 BLDA: 486 MMHG (ref 80–105)
PO2 BLDA: 515 MMHG (ref 80–105)
PO2 BLDA: 86.9 MM HG (ref 80–100)
PO2 BLDA: 87.5 MM HG (ref 80–100)
POTASSIUM BLDA-SCNC: 4.2 MMOL/L (ref 3.5–4.9)
POTASSIUM BLDA-SCNC: 4.7 MMOL/L (ref 3.5–4.9)
POTASSIUM BLDA-SCNC: 4.7 MMOL/L (ref 3.5–4.9)
POTASSIUM BLDA-SCNC: 5.2 MMOL/L (ref 3.5–4.9)
POTASSIUM BLDA-SCNC: 5.2 MMOL/L (ref 3.5–4.9)
POTASSIUM BLDA-SCNC: 5.4 MMOL/L (ref 3.5–4.9)
POTASSIUM BLDA-SCNC: 5.5 MMOL/L (ref 3.5–4.9)
POTASSIUM BLDA-SCNC: 5.6 MMOL/L (ref 3.5–4.9)
POTASSIUM SERPL-SCNC: 4.4 MMOL/L (ref 3.5–5.2)
POTASSIUM SERPL-SCNC: 4.6 MMOL/L (ref 3.5–5.2)
POTASSIUM SERPL-SCNC: 4.8 MMOL/L (ref 3.5–5.2)
PROTHROMBIN TIME: 18.2 SECONDS (ref 11.7–14.2)
PSV: 6 CMH2O
PSV: 6 CMH2O
QT INTERVAL: 430 MS
RBC # BLD AUTO: 3.68 10*6/MM3 (ref 4.14–5.8)
RBC # BLD AUTO: 3.98 10*6/MM3 (ref 4.14–5.8)
RBC MORPH BLD: NORMAL
RH BLD: POSITIVE
SAO2 % BLDA: 100 % (ref 95–98)
SAO2 % BLDA: 78 % (ref 95–98)
SAO2 % BLDCOA: 95.9 % (ref 92–99)
SAO2 % BLDCOA: 96.3 % (ref 92–99)
SAO2 % BLDCOA: 99.9 % (ref 92–99)
SET MECH RESP RATE: 14
SET MECH RESP RATE: 16
SODIUM SERPL-SCNC: 134 MMOL/L (ref 136–145)
SODIUM SERPL-SCNC: 139 MMOL/L (ref 136–145)
TOTAL RATE: 14 BREATHS/MINUTE
TOTAL RATE: 16 BREATHS/MINUTE
TOTAL RATE: 16 BREATHS/MINUTE
VENTILATOR MODE: ABNORMAL
VT ON VENT VENT: 610 ML
VT ON VENT VENT: 790 ML
WBC # BLD AUTO: 10.38 10*3/MM3 (ref 3.4–10.8)
WBC # BLD AUTO: 6.54 10*3/MM3 (ref 3.4–10.8)
WBC MORPH BLD: NORMAL

## 2021-09-14 PROCEDURE — 85025 COMPLETE CBC W/AUTO DIFF WBC: CPT | Performed by: NURSE PRACTITIONER

## 2021-09-14 PROCEDURE — 021309W BYPASS CORONARY ARTERY, FOUR OR MORE ARTERIES FROM AORTA WITH AUTOLOGOUS VENOUS TISSUE, OPEN APPROACH: ICD-10-PCS | Performed by: THORACIC SURGERY (CARDIOTHORACIC VASCULAR SURGERY)

## 2021-09-14 PROCEDURE — C1751 CATH, INF, PER/CENT/MIDLINE: HCPCS | Performed by: STUDENT IN AN ORGANIZED HEALTH CARE EDUCATION/TRAINING PROGRAM

## 2021-09-14 PROCEDURE — 33427 REPAIR OF MITRAL VALVE: CPT | Performed by: THORACIC SURGERY (CARDIOTHORACIC VASCULAR SURGERY)

## 2021-09-14 PROCEDURE — 25010000002 METOCLOPRAMIDE PER 10 MG: Performed by: NURSE PRACTITIONER

## 2021-09-14 PROCEDURE — 86901 BLOOD TYPING SEROLOGIC RH(D): CPT

## 2021-09-14 PROCEDURE — 85384 FIBRINOGEN ACTIVITY: CPT | Performed by: NURSE PRACTITIONER

## 2021-09-14 PROCEDURE — 85018 HEMOGLOBIN: CPT

## 2021-09-14 PROCEDURE — 94799 UNLISTED PULMONARY SVC/PX: CPT

## 2021-09-14 PROCEDURE — 63710000001 INSULIN REGULAR HUMAN PER 5 UNITS: Performed by: STUDENT IN AN ORGANIZED HEALTH CARE EDUCATION/TRAINING PROGRAM

## 2021-09-14 PROCEDURE — 82803 BLOOD GASES ANY COMBINATION: CPT

## 2021-09-14 PROCEDURE — 25010000003 CEFAZOLIN IN DEXTROSE 2-4 GM/100ML-% SOLUTION: Performed by: THORACIC SURGERY (CARDIOTHORACIC VASCULAR SURGERY)

## 2021-09-14 PROCEDURE — 25010000002 PROPOFOL 10 MG/ML EMULSION: Performed by: STUDENT IN AN ORGANIZED HEALTH CARE EDUCATION/TRAINING PROGRAM

## 2021-09-14 PROCEDURE — 25010000002 HEPARIN (PORCINE) PER 1000 UNITS: Performed by: THORACIC SURGERY (CARDIOTHORACIC VASCULAR SURGERY)

## 2021-09-14 PROCEDURE — 85007 BL SMEAR W/DIFF WBC COUNT: CPT | Performed by: NURSE PRACTITIONER

## 2021-09-14 PROCEDURE — 25010000002 HEPARIN (PORCINE) PER 1000 UNITS: Performed by: STUDENT IN AN ORGANIZED HEALTH CARE EDUCATION/TRAINING PROGRAM

## 2021-09-14 PROCEDURE — 25010000003 CEFAZOLIN IN DEXTROSE 2-4 GM/100ML-% SOLUTION: Performed by: NURSE PRACTITIONER

## 2021-09-14 PROCEDURE — C1889 IMPLANT/INSERT DEVICE, NOC: HCPCS | Performed by: THORACIC SURGERY (CARDIOTHORACIC VASCULAR SURGERY)

## 2021-09-14 PROCEDURE — C1729 CATH, DRAINAGE: HCPCS | Performed by: THORACIC SURGERY (CARDIOTHORACIC VASCULAR SURGERY)

## 2021-09-14 PROCEDURE — 88305 TISSUE EXAM BY PATHOLOGIST: CPT | Performed by: THORACIC SURGERY (CARDIOTHORACIC VASCULAR SURGERY)

## 2021-09-14 PROCEDURE — C2618 PROBE/NEEDLE, CRYO: HCPCS | Performed by: THORACIC SURGERY (CARDIOTHORACIC VASCULAR SURGERY)

## 2021-09-14 PROCEDURE — 94002 VENT MGMT INPAT INIT DAY: CPT

## 2021-09-14 PROCEDURE — 25010000002 HEPARIN (PORCINE) PER 1000 UNITS

## 2021-09-14 PROCEDURE — 86900 BLOOD TYPING SEROLOGIC ABO: CPT

## 2021-09-14 PROCEDURE — A4648 IMPLANTABLE TISSUE MARKER: HCPCS | Performed by: THORACIC SURGERY (CARDIOTHORACIC VASCULAR SURGERY)

## 2021-09-14 PROCEDURE — 83735 ASSAY OF MAGNESIUM: CPT | Performed by: NURSE PRACTITIONER

## 2021-09-14 PROCEDURE — 25010000002 MIDAZOLAM PER 1 MG: Performed by: STUDENT IN AN ORGANIZED HEALTH CARE EDUCATION/TRAINING PROGRAM

## 2021-09-14 PROCEDURE — 33259 ABLATE ATRIA W/BYPASS ADD-ON: CPT | Performed by: THORACIC SURGERY (CARDIOTHORACIC VASCULAR SURGERY)

## 2021-09-14 PROCEDURE — 93010 ELECTROCARDIOGRAM REPORT: CPT | Performed by: INTERNAL MEDICINE

## 2021-09-14 PROCEDURE — 84132 ASSAY OF SERUM POTASSIUM: CPT | Performed by: THORACIC SURGERY (CARDIOTHORACIC VASCULAR SURGERY)

## 2021-09-14 PROCEDURE — 02L70ZK OCCLUSION OF LEFT ATRIAL APPENDAGE, OPEN APPROACH: ICD-10-PCS | Performed by: THORACIC SURGERY (CARDIOTHORACIC VASCULAR SURGERY)

## 2021-09-14 PROCEDURE — 25010000002 ALBUMIN HUMAN 25% PER 50 ML

## 2021-09-14 PROCEDURE — 5A1221Z PERFORMANCE OF CARDIAC OUTPUT, CONTINUOUS: ICD-10-PCS | Performed by: THORACIC SURGERY (CARDIOTHORACIC VASCULAR SURGERY)

## 2021-09-14 PROCEDURE — 82330 ASSAY OF CALCIUM: CPT | Performed by: NURSE PRACTITIONER

## 2021-09-14 PROCEDURE — 80069 RENAL FUNCTION PANEL: CPT | Performed by: NURSE PRACTITIONER

## 2021-09-14 PROCEDURE — 33521 CABG ARTERY-VEIN FOUR: CPT | Performed by: THORACIC SURGERY (CARDIOTHORACIC VASCULAR SURGERY)

## 2021-09-14 PROCEDURE — 02QG0ZZ REPAIR MITRAL VALVE, OPEN APPROACH: ICD-10-PCS | Performed by: THORACIC SURGERY (CARDIOTHORACIC VASCULAR SURGERY)

## 2021-09-14 PROCEDURE — 25010000002 PAPAVERINE PER 60 MG: Performed by: THORACIC SURGERY (CARDIOTHORACIC VASCULAR SURGERY)

## 2021-09-14 PROCEDURE — 85027 COMPLETE CBC AUTOMATED: CPT | Performed by: NURSE PRACTITIONER

## 2021-09-14 PROCEDURE — C1713 ANCHOR/SCREW BN/BN,TIS/BN: HCPCS | Performed by: THORACIC SURGERY (CARDIOTHORACIC VASCULAR SURGERY)

## 2021-09-14 PROCEDURE — 33533 CABG ARTERIAL SINGLE: CPT | Performed by: THORACIC SURGERY (CARDIOTHORACIC VASCULAR SURGERY)

## 2021-09-14 PROCEDURE — 85730 THROMBOPLASTIN TIME PARTIAL: CPT | Performed by: NURSE PRACTITIONER

## 2021-09-14 PROCEDURE — 71045 X-RAY EXAM CHEST 1 VIEW: CPT

## 2021-09-14 PROCEDURE — 25010000003 MILRINONE LACTATE IN DEXTROSE 20-5 MG/100ML-% SOLUTION: Performed by: NURSE PRACTITIONER

## 2021-09-14 PROCEDURE — 85347 COAGULATION TIME ACTIVATED: CPT

## 2021-09-14 PROCEDURE — 33508 ENDOSCOPIC VEIN HARVEST: CPT | Performed by: THORACIC SURGERY (CARDIOTHORACIC VASCULAR SURGERY)

## 2021-09-14 PROCEDURE — 06BQ4ZZ EXCISION OF LEFT SAPHENOUS VEIN, PERCUTANEOUS ENDOSCOPIC APPROACH: ICD-10-PCS | Performed by: THORACIC SURGERY (CARDIOTHORACIC VASCULAR SURGERY)

## 2021-09-14 PROCEDURE — 25010000002 FENTANYL CITRATE (PF) 50 MCG/ML SOLUTION: Performed by: STUDENT IN AN ORGANIZED HEALTH CARE EDUCATION/TRAINING PROGRAM

## 2021-09-14 PROCEDURE — 02100Z9 BYPASS CORONARY ARTERY, ONE ARTERY FROM LEFT INTERNAL MAMMARY, OPEN APPROACH: ICD-10-PCS | Performed by: THORACIC SURGERY (CARDIOTHORACIC VASCULAR SURGERY)

## 2021-09-14 PROCEDURE — 85014 HEMATOCRIT: CPT

## 2021-09-14 PROCEDURE — 93005 ELECTROCARDIOGRAM TRACING: CPT | Performed by: NURSE PRACTITIONER

## 2021-09-14 PROCEDURE — 25010000002 VANCOMYCIN 1 G RECONSTITUTED SOLUTION

## 2021-09-14 PROCEDURE — 25010000002 PROTAMINE SULFATE PER 10 MG: Performed by: STUDENT IN AN ORGANIZED HEALTH CARE EDUCATION/TRAINING PROGRAM

## 2021-09-14 PROCEDURE — 82962 GLUCOSE BLOOD TEST: CPT

## 2021-09-14 PROCEDURE — 93318 ECHO TRANSESOPHAGEAL INTRAOP: CPT | Performed by: STUDENT IN AN ORGANIZED HEALTH CARE EDUCATION/TRAINING PROGRAM

## 2021-09-14 PROCEDURE — 82947 ASSAY GLUCOSE BLOOD QUANT: CPT

## 2021-09-14 PROCEDURE — 85610 PROTHROMBIN TIME: CPT | Performed by: NURSE PRACTITIONER

## 2021-09-14 PROCEDURE — P9047 ALBUMIN (HUMAN), 25%, 50ML: HCPCS

## 2021-09-14 PROCEDURE — 25010000002 MAGNESIUM SULFATE PER 500 MG OF MAGNESIUM: Performed by: STUDENT IN AN ORGANIZED HEALTH CARE EDUCATION/TRAINING PROGRAM

## 2021-09-14 PROCEDURE — P9041 ALBUMIN (HUMAN),5%, 50ML: HCPCS | Performed by: NURSE PRACTITIONER

## 2021-09-14 PROCEDURE — B24BZZ4 ULTRASONOGRAPHY OF HEART WITH AORTA, TRANSESOPHAGEAL: ICD-10-PCS | Performed by: THORACIC SURGERY (CARDIOTHORACIC VASCULAR SURGERY)

## 2021-09-14 PROCEDURE — 25010000002 MORPHINE PER 10 MG: Performed by: NURSE PRACTITIONER

## 2021-09-14 PROCEDURE — 25010000002 ALBUMIN HUMAN 5% PER 50 ML: Performed by: NURSE PRACTITIONER

## 2021-09-14 DEVICE — SS SUTURE, 4 PER SLEEVE
Type: IMPLANTABLE DEVICE | Site: STERNUM | Status: FUNCTIONAL
Brand: MYO/WIRE II

## 2021-09-14 DEVICE — BND ANULPLSTY SIMUPLUS 36MM: Type: IMPLANTABLE DEVICE | Site: HEART | Status: FUNCTIONAL

## 2021-09-14 DEVICE — SS SUTURE, 3 PER SLEEVE
Type: IMPLANTABLE DEVICE | Site: STERNUM | Status: FUNCTIONAL
Brand: MYO/WIRE II

## 2021-09-14 DEVICE — COR-KNOT MINI® COMBO KITBASE PACKAGE TYPE - KITEACH STERILE PACKAGE KIT CONTAINS (2) SINGLE PATIENT USE COR-KNOT MINI® DEVICES AND (12) COR-KNOT® QUICK LOADS®.
Type: IMPLANTABLE DEVICE | Site: STERNUM | Status: FUNCTIONAL
Brand: COR-KNOT MINI®

## 2021-09-14 RX ORDER — LIDOCAINE HYDROCHLORIDE 10 MG/ML
0.5 INJECTION, SOLUTION EPIDURAL; INFILTRATION; INTRACAUDAL; PERINEURAL ONCE AS NEEDED
Status: DISCONTINUED | OUTPATIENT
Start: 2021-09-14 | End: 2021-09-14 | Stop reason: HOSPADM

## 2021-09-14 RX ORDER — PROTAMINE SULFATE 10 MG/ML
INJECTION, SOLUTION INTRAVENOUS AS NEEDED
Status: DISCONTINUED | OUTPATIENT
Start: 2021-09-14 | End: 2021-09-14 | Stop reason: SURG

## 2021-09-14 RX ORDER — CHLORHEXIDINE GLUCONATE 0.12 MG/ML
15 RINSE ORAL EVERY 12 HOURS
Status: DISCONTINUED | OUTPATIENT
Start: 2021-09-14 | End: 2021-09-19

## 2021-09-14 RX ORDER — ACETAMINOPHEN 160 MG/5ML
650 SOLUTION ORAL EVERY 4 HOURS
Status: DISPENSED | OUTPATIENT
Start: 2021-09-14 | End: 2021-09-15

## 2021-09-14 RX ORDER — SODIUM CHLORIDE 0.9 % (FLUSH) 0.9 %
30 SYRINGE (ML) INJECTION ONCE AS NEEDED
Status: COMPLETED | OUTPATIENT
Start: 2021-09-14 | End: 2021-09-14

## 2021-09-14 RX ORDER — ACETAMINOPHEN 160 MG/5ML
650 SOLUTION ORAL EVERY 4 HOURS PRN
Status: DISCONTINUED | OUTPATIENT
Start: 2021-09-15 | End: 2021-09-22 | Stop reason: HOSPADM

## 2021-09-14 RX ORDER — CHLORHEXIDINE GLUCONATE 500 MG/1
1 CLOTH TOPICAL EVERY 12 HOURS PRN
Status: DISCONTINUED | OUTPATIENT
Start: 2021-09-14 | End: 2021-09-14 | Stop reason: HOSPADM

## 2021-09-14 RX ORDER — FAMOTIDINE 10 MG/ML
20 INJECTION, SOLUTION INTRAVENOUS ONCE
Status: DISCONTINUED | OUTPATIENT
Start: 2021-09-14 | End: 2021-09-14 | Stop reason: HOSPADM

## 2021-09-14 RX ORDER — LIDOCAINE HYDROCHLORIDE 20 MG/ML
INJECTION, SOLUTION EPIDURAL; INFILTRATION; INTRACAUDAL; PERINEURAL AS NEEDED
Status: DISCONTINUED | OUTPATIENT
Start: 2021-09-14 | End: 2021-09-14 | Stop reason: SURG

## 2021-09-14 RX ORDER — AMINOCAPROIC ACID 250 MG/ML
INJECTION, SOLUTION INTRAVENOUS AS NEEDED
Status: DISCONTINUED | OUTPATIENT
Start: 2021-09-14 | End: 2021-09-14 | Stop reason: SURG

## 2021-09-14 RX ORDER — ACETAMINOPHEN 325 MG/1
650 TABLET ORAL EVERY 4 HOURS
Status: DISPENSED | OUTPATIENT
Start: 2021-09-14 | End: 2021-09-15

## 2021-09-14 RX ORDER — POTASSIUM CHLORIDE 1.5 G/1.77G
40 POWDER, FOR SOLUTION ORAL AS NEEDED
Status: DISCONTINUED | OUTPATIENT
Start: 2021-09-14 | End: 2021-09-22 | Stop reason: HOSPADM

## 2021-09-14 RX ORDER — NOREPINEPHRINE BIT/0.9 % NACL 8 MG/250ML
.02-.3 INFUSION BOTTLE (ML) INTRAVENOUS CONTINUOUS PRN
Status: DISCONTINUED | OUTPATIENT
Start: 2021-09-14 | End: 2021-09-16

## 2021-09-14 RX ORDER — POTASSIUM CHLORIDE 750 MG/1
40 TABLET, FILM COATED, EXTENDED RELEASE ORAL AS NEEDED
Status: DISCONTINUED | OUTPATIENT
Start: 2021-09-14 | End: 2021-09-22 | Stop reason: HOSPADM

## 2021-09-14 RX ORDER — METOCLOPRAMIDE HYDROCHLORIDE 5 MG/ML
10 INJECTION INTRAMUSCULAR; INTRAVENOUS EVERY 6 HOURS
Status: DISPENSED | OUTPATIENT
Start: 2021-09-14 | End: 2021-09-15

## 2021-09-14 RX ORDER — ATORVASTATIN CALCIUM 20 MG/1
40 TABLET, FILM COATED ORAL NIGHTLY
Status: DISCONTINUED | OUTPATIENT
Start: 2021-09-14 | End: 2021-09-22 | Stop reason: HOSPADM

## 2021-09-14 RX ORDER — PROPOFOL 10 MG/ML
VIAL (ML) INTRAVENOUS CONTINUOUS PRN
Status: DISCONTINUED | OUTPATIENT
Start: 2021-09-14 | End: 2021-09-14 | Stop reason: SURG

## 2021-09-14 RX ORDER — SODIUM CHLORIDE 9 MG/ML
30 INJECTION, SOLUTION INTRAVENOUS CONTINUOUS
Status: DISCONTINUED | OUTPATIENT
Start: 2021-09-14 | End: 2021-09-22 | Stop reason: HOSPADM

## 2021-09-14 RX ORDER — MORPHINE SULFATE 2 MG/ML
1 INJECTION, SOLUTION INTRAMUSCULAR; INTRAVENOUS EVERY 4 HOURS PRN
Status: DISPENSED | OUTPATIENT
Start: 2021-09-14 | End: 2021-09-21

## 2021-09-14 RX ORDER — SODIUM CHLORIDE 9 MG/ML
30 INJECTION, SOLUTION INTRAVENOUS CONTINUOUS PRN
Status: DISCONTINUED | OUTPATIENT
Start: 2021-09-14 | End: 2021-09-22 | Stop reason: HOSPADM

## 2021-09-14 RX ORDER — MILRINONE LACTATE 0.2 MG/ML
.25-.375 INJECTION, SOLUTION INTRAVENOUS CONTINUOUS PRN
Status: DISCONTINUED | OUTPATIENT
Start: 2021-09-14 | End: 2021-09-16

## 2021-09-14 RX ORDER — CEFAZOLIN SODIUM 2 G/100ML
2 INJECTION, SOLUTION INTRAVENOUS
Status: COMPLETED | OUTPATIENT
Start: 2021-09-14 | End: 2021-09-14

## 2021-09-14 RX ORDER — PANTOPRAZOLE SODIUM 40 MG/1
40 TABLET, DELAYED RELEASE ORAL EVERY MORNING
Status: DISCONTINUED | OUTPATIENT
Start: 2021-09-15 | End: 2021-09-22 | Stop reason: HOSPADM

## 2021-09-14 RX ORDER — HEPARIN SODIUM 5000 [USP'U]/ML
INJECTION, SOLUTION INTRAVENOUS; SUBCUTANEOUS AS NEEDED
Status: DISCONTINUED | OUTPATIENT
Start: 2021-09-14 | End: 2021-09-14 | Stop reason: HOSPADM

## 2021-09-14 RX ORDER — FENTANYL CITRATE 50 UG/ML
INJECTION, SOLUTION INTRAMUSCULAR; INTRAVENOUS AS NEEDED
Status: DISCONTINUED | OUTPATIENT
Start: 2021-09-14 | End: 2021-09-14 | Stop reason: SURG

## 2021-09-14 RX ORDER — PROPOFOL 10 MG/ML
VIAL (ML) INTRAVENOUS AS NEEDED
Status: DISCONTINUED | OUTPATIENT
Start: 2021-09-14 | End: 2021-09-14 | Stop reason: SURG

## 2021-09-14 RX ORDER — POTASSIUM CHLORIDE 29.8 MG/ML
20 INJECTION INTRAVENOUS
Status: DISCONTINUED | OUTPATIENT
Start: 2021-09-14 | End: 2021-09-22 | Stop reason: HOSPADM

## 2021-09-14 RX ORDER — ACETAMINOPHEN 325 MG/1
650 TABLET ORAL EVERY 4 HOURS PRN
Status: DISCONTINUED | OUTPATIENT
Start: 2021-09-15 | End: 2021-09-22 | Stop reason: HOSPADM

## 2021-09-14 RX ORDER — FENTANYL CITRATE 50 UG/ML
50 INJECTION, SOLUTION INTRAMUSCULAR; INTRAVENOUS
Status: DISCONTINUED | OUTPATIENT
Start: 2021-09-14 | End: 2021-09-14 | Stop reason: HOSPADM

## 2021-09-14 RX ORDER — ALBUMIN, HUMAN INJ 5% 5 %
1500 SOLUTION INTRAVENOUS AS NEEDED
Status: DISPENSED | OUTPATIENT
Start: 2021-09-14 | End: 2021-09-15

## 2021-09-14 RX ORDER — NICARDIPINE HYDROCHLORIDE 2.5 MG/ML
INJECTION INTRAVENOUS CONTINUOUS PRN
Status: DISCONTINUED | OUTPATIENT
Start: 2021-09-14 | End: 2021-09-14 | Stop reason: SURG

## 2021-09-14 RX ORDER — METHADONE HYDROCHLORIDE 10 MG/1
10 TABLET ORAL ONCE
Status: COMPLETED | OUTPATIENT
Start: 2021-09-14 | End: 2021-09-14

## 2021-09-14 RX ORDER — MORPHINE SULFATE 2 MG/ML
4 INJECTION, SOLUTION INTRAMUSCULAR; INTRAVENOUS
Status: DISCONTINUED | OUTPATIENT
Start: 2021-09-14 | End: 2021-09-22 | Stop reason: HOSPADM

## 2021-09-14 RX ORDER — SODIUM CHLORIDE 0.9 % (FLUSH) 0.9 %
3 SYRINGE (ML) INJECTION EVERY 12 HOURS SCHEDULED
Status: DISCONTINUED | OUTPATIENT
Start: 2021-09-14 | End: 2021-09-14 | Stop reason: HOSPADM

## 2021-09-14 RX ORDER — PHENYLEPHRINE HCL IN 0.9% NACL 0.5 MG/5ML
.2-2 SYRINGE (ML) INTRAVENOUS CONTINUOUS PRN
Status: DISCONTINUED | OUTPATIENT
Start: 2021-09-14 | End: 2021-09-16

## 2021-09-14 RX ORDER — NALOXONE HCL 0.4 MG/ML
0.4 VIAL (ML) INJECTION
Status: DISCONTINUED | OUTPATIENT
Start: 2021-09-14 | End: 2021-09-22 | Stop reason: HOSPADM

## 2021-09-14 RX ORDER — SODIUM CHLORIDE 0.9 % (FLUSH) 0.9 %
3-10 SYRINGE (ML) INJECTION AS NEEDED
Status: DISCONTINUED | OUTPATIENT
Start: 2021-09-14 | End: 2021-09-14 | Stop reason: HOSPADM

## 2021-09-14 RX ORDER — SODIUM CHLORIDE 9 MG/ML
INJECTION, SOLUTION INTRAVENOUS CONTINUOUS PRN
Status: DISCONTINUED | OUTPATIENT
Start: 2021-09-14 | End: 2021-09-14 | Stop reason: SURG

## 2021-09-14 RX ORDER — ALPRAZOLAM 0.25 MG/1
0.25 TABLET ORAL EVERY 8 HOURS PRN
Status: DISCONTINUED | OUTPATIENT
Start: 2021-09-14 | End: 2021-09-22 | Stop reason: HOSPADM

## 2021-09-14 RX ORDER — PAPAVERINE HYDROCHLORIDE 30 MG/ML
INJECTION INTRAMUSCULAR; INTRAVENOUS AS NEEDED
Status: DISCONTINUED | OUTPATIENT
Start: 2021-09-14 | End: 2021-09-14 | Stop reason: HOSPADM

## 2021-09-14 RX ORDER — HEPARIN SODIUM 1000 [USP'U]/ML
INJECTION, SOLUTION INTRAVENOUS; SUBCUTANEOUS AS NEEDED
Status: DISCONTINUED | OUTPATIENT
Start: 2021-09-14 | End: 2021-09-14 | Stop reason: SURG

## 2021-09-14 RX ORDER — ACETAMINOPHEN 500 MG
1000 TABLET ORAL ONCE
Status: COMPLETED | OUTPATIENT
Start: 2021-09-14 | End: 2021-09-14

## 2021-09-14 RX ORDER — MEPERIDINE HYDROCHLORIDE 25 MG/ML
25 INJECTION INTRAMUSCULAR; INTRAVENOUS; SUBCUTANEOUS EVERY 4 HOURS PRN
Status: ACTIVE | OUTPATIENT
Start: 2021-09-14 | End: 2021-09-15

## 2021-09-14 RX ORDER — DEXMEDETOMIDINE HYDROCHLORIDE 4 UG/ML
INJECTION, SOLUTION INTRAVENOUS CONTINUOUS PRN
Status: DISCONTINUED | OUTPATIENT
Start: 2021-09-14 | End: 2021-09-14 | Stop reason: SURG

## 2021-09-14 RX ORDER — MIDAZOLAM HYDROCHLORIDE 1 MG/ML
INJECTION INTRAMUSCULAR; INTRAVENOUS AS NEEDED
Status: DISCONTINUED | OUTPATIENT
Start: 2021-09-14 | End: 2021-09-14 | Stop reason: SURG

## 2021-09-14 RX ORDER — CYCLOBENZAPRINE HCL 10 MG
10 TABLET ORAL EVERY 8 HOURS PRN
Status: DISCONTINUED | OUTPATIENT
Start: 2021-09-15 | End: 2021-09-22 | Stop reason: HOSPADM

## 2021-09-14 RX ORDER — FAMOTIDINE 10 MG/ML
20 INJECTION, SOLUTION INTRAVENOUS ONCE
Status: COMPLETED | OUTPATIENT
Start: 2021-09-14 | End: 2021-09-14

## 2021-09-14 RX ORDER — POTASSIUM CHLORIDE 7.45 MG/ML
10 INJECTION INTRAVENOUS
Status: DISCONTINUED | OUTPATIENT
Start: 2021-09-14 | End: 2021-09-22 | Stop reason: HOSPADM

## 2021-09-14 RX ORDER — CHLORHEXIDINE GLUCONATE 0.12 MG/ML
15 RINSE ORAL ONCE
Status: COMPLETED | OUTPATIENT
Start: 2021-09-14 | End: 2021-09-14

## 2021-09-14 RX ORDER — ONDANSETRON 2 MG/ML
4 INJECTION INTRAMUSCULAR; INTRAVENOUS EVERY 6 HOURS PRN
Status: DISCONTINUED | OUTPATIENT
Start: 2021-09-14 | End: 2021-09-22 | Stop reason: HOSPADM

## 2021-09-14 RX ORDER — SODIUM CHLORIDE, SODIUM LACTATE, POTASSIUM CHLORIDE, CALCIUM CHLORIDE 600; 310; 30; 20 MG/100ML; MG/100ML; MG/100ML; MG/100ML
9 INJECTION, SOLUTION INTRAVENOUS CONTINUOUS
Status: DISCONTINUED | OUTPATIENT
Start: 2021-09-14 | End: 2021-09-14

## 2021-09-14 RX ORDER — DOPAMINE HYDROCHLORIDE 160 MG/100ML
2-20 INJECTION, SOLUTION INTRAVENOUS CONTINUOUS PRN
Status: DISCONTINUED | OUTPATIENT
Start: 2021-09-14 | End: 2021-09-16

## 2021-09-14 RX ORDER — POTASSIUM CHLORIDE 29.8 MG/ML
20 INJECTION INTRAVENOUS
Status: DISCONTINUED | OUTPATIENT
Start: 2021-09-14 | End: 2021-09-14 | Stop reason: SDUPTHER

## 2021-09-14 RX ORDER — ACETAMINOPHEN 650 MG/1
650 SUPPOSITORY RECTAL EVERY 4 HOURS
Status: DISPENSED | OUTPATIENT
Start: 2021-09-14 | End: 2021-09-15

## 2021-09-14 RX ORDER — POLYETHYLENE GLYCOL 3350 17 G/17G
17 POWDER, FOR SOLUTION ORAL DAILY PRN
Status: DISCONTINUED | OUTPATIENT
Start: 2021-09-14 | End: 2021-09-22 | Stop reason: HOSPADM

## 2021-09-14 RX ORDER — ROCURONIUM BROMIDE 10 MG/ML
INJECTION, SOLUTION INTRAVENOUS AS NEEDED
Status: DISCONTINUED | OUTPATIENT
Start: 2021-09-14 | End: 2021-09-14 | Stop reason: SURG

## 2021-09-14 RX ORDER — BISACODYL 5 MG/1
10 TABLET, DELAYED RELEASE ORAL DAILY PRN
Status: DISCONTINUED | OUTPATIENT
Start: 2021-09-14 | End: 2021-09-22 | Stop reason: HOSPADM

## 2021-09-14 RX ORDER — ASPIRIN 81 MG/1
81 TABLET ORAL DAILY
Status: DISCONTINUED | OUTPATIENT
Start: 2021-09-15 | End: 2021-09-22 | Stop reason: HOSPADM

## 2021-09-14 RX ORDER — MIDAZOLAM HYDROCHLORIDE 1 MG/ML
1 INJECTION INTRAMUSCULAR; INTRAVENOUS ONCE
Status: DISCONTINUED | OUTPATIENT
Start: 2021-09-14 | End: 2021-09-14 | Stop reason: HOSPADM

## 2021-09-14 RX ORDER — LEVOTHYROXINE SODIUM 0.05 MG/1
50 TABLET ORAL
Status: DISCONTINUED | OUTPATIENT
Start: 2021-09-15 | End: 2021-09-22 | Stop reason: HOSPADM

## 2021-09-14 RX ORDER — HYDROCODONE BITARTRATE AND ACETAMINOPHEN 5; 325 MG/1; MG/1
2 TABLET ORAL EVERY 4 HOURS PRN
Status: DISCONTINUED | OUTPATIENT
Start: 2021-09-14 | End: 2021-09-15

## 2021-09-14 RX ORDER — CEFAZOLIN SODIUM 2 G/100ML
2 INJECTION, SOLUTION INTRAVENOUS EVERY 8 HOURS
Status: COMPLETED | OUTPATIENT
Start: 2021-09-14 | End: 2021-09-16

## 2021-09-14 RX ORDER — NITROGLYCERIN 20 MG/100ML
5-200 INJECTION INTRAVENOUS
Status: DISCONTINUED | OUTPATIENT
Start: 2021-09-14 | End: 2021-09-16

## 2021-09-14 RX ORDER — MIDAZOLAM HYDROCHLORIDE 1 MG/ML
2 INJECTION INTRAMUSCULAR; INTRAVENOUS
Status: DISCONTINUED | OUTPATIENT
Start: 2021-09-14 | End: 2021-09-15

## 2021-09-14 RX ORDER — MAGNESIUM SULFATE HEPTAHYDRATE 500 MG/ML
INJECTION, SOLUTION INTRAMUSCULAR; INTRAVENOUS AS NEEDED
Status: DISCONTINUED | OUTPATIENT
Start: 2021-09-14 | End: 2021-09-14 | Stop reason: SURG

## 2021-09-14 RX ORDER — MAGNESIUM SULFATE 1 G/100ML
1 INJECTION INTRAVENOUS EVERY 8 HOURS
Status: ACTIVE | OUTPATIENT
Start: 2021-09-14 | End: 2021-09-15

## 2021-09-14 RX ORDER — PANTOPRAZOLE SODIUM 40 MG/10ML
40 INJECTION, POWDER, LYOPHILIZED, FOR SOLUTION INTRAVENOUS ONCE
Status: COMPLETED | OUTPATIENT
Start: 2021-09-14 | End: 2021-09-14

## 2021-09-14 RX ORDER — BISACODYL 10 MG
10 SUPPOSITORY, RECTAL RECTAL DAILY PRN
Status: DISCONTINUED | OUTPATIENT
Start: 2021-09-15 | End: 2021-09-22 | Stop reason: HOSPADM

## 2021-09-14 RX ORDER — ACETAMINOPHEN 650 MG/1
650 SUPPOSITORY RECTAL EVERY 4 HOURS PRN
Status: DISCONTINUED | OUTPATIENT
Start: 2021-09-15 | End: 2021-09-22 | Stop reason: HOSPADM

## 2021-09-14 RX ORDER — OXYCODONE HYDROCHLORIDE 5 MG/1
10 TABLET ORAL EVERY 4 HOURS PRN
Status: DISCONTINUED | OUTPATIENT
Start: 2021-09-14 | End: 2021-09-15

## 2021-09-14 RX ORDER — AMOXICILLIN 250 MG
2 CAPSULE ORAL NIGHTLY
Status: DISCONTINUED | OUTPATIENT
Start: 2021-09-15 | End: 2021-09-22 | Stop reason: HOSPADM

## 2021-09-14 RX ORDER — CEFAZOLIN SODIUM 2 G/100ML
2 INJECTION, SOLUTION INTRAVENOUS EVERY 8 HOURS
Status: DISCONTINUED | OUTPATIENT
Start: 2021-09-14 | End: 2021-09-14

## 2021-09-14 RX ORDER — MIDAZOLAM HYDROCHLORIDE 1 MG/ML
0.5 INJECTION INTRAMUSCULAR; INTRAVENOUS
Status: DISCONTINUED | OUTPATIENT
Start: 2021-09-14 | End: 2021-09-14 | Stop reason: HOSPADM

## 2021-09-14 RX ADMIN — SODIUM CHLORIDE: 9 INJECTION, SOLUTION INTRAVENOUS at 06:49

## 2021-09-14 RX ADMIN — MORPHINE SULFATE 1 MG: 2 INJECTION, SOLUTION INTRAMUSCULAR; INTRAVENOUS at 21:04

## 2021-09-14 RX ADMIN — PROPOFOL 50 MG: 10 INJECTION, EMULSION INTRAVENOUS at 08:50

## 2021-09-14 RX ADMIN — ALPRAZOLAM 0.25 MG: 0.25 TABLET ORAL at 22:00

## 2021-09-14 RX ADMIN — METOPROLOL TARTRATE 12.5 MG: 25 TABLET, FILM COATED ORAL at 05:52

## 2021-09-14 RX ADMIN — ALBUMIN HUMAN 250 ML: 0.05 INJECTION, SOLUTION INTRAVENOUS at 15:07

## 2021-09-14 RX ADMIN — DEXMEDETOMIDINE HYDROCHLORIDE 1 MCG/KG/HR: 100 INJECTION, SOLUTION, CONCENTRATE INTRAVENOUS at 14:21

## 2021-09-14 RX ADMIN — MIDAZOLAM 1 MG: 1 INJECTION INTRAMUSCULAR; INTRAVENOUS at 06:49

## 2021-09-14 RX ADMIN — ACETAMINOPHEN 1000 MG: 500 TABLET, FILM COATED ORAL at 05:52

## 2021-09-14 RX ADMIN — AMINOCAPROIC ACID 10 G: 250 INJECTION, SOLUTION INTRAVENOUS at 11:39

## 2021-09-14 RX ADMIN — CYCLOBENZAPRINE 10 MG: 10 TABLET, FILM COATED ORAL at 22:00

## 2021-09-14 RX ADMIN — ALBUMIN HUMAN 250 ML: 0.05 INJECTION, SOLUTION INTRAVENOUS at 14:32

## 2021-09-14 RX ADMIN — MILRINONE LACTATE IN DEXTROSE 0.25 MCG/KG/MIN: 200 INJECTION, SOLUTION INTRAVENOUS at 15:08

## 2021-09-14 RX ADMIN — CEFAZOLIN SODIUM 2 G: 2 INJECTION, SOLUTION INTRAVENOUS at 19:24

## 2021-09-14 RX ADMIN — PROPOFOL 150 MG: 10 INJECTION, EMULSION INTRAVENOUS at 06:55

## 2021-09-14 RX ADMIN — ACETAMINOPHEN 650 MG: 325 TABLET, FILM COATED ORAL at 22:00

## 2021-09-14 RX ADMIN — MIDAZOLAM 1 MG: 1 INJECTION INTRAMUSCULAR; INTRAVENOUS at 06:41

## 2021-09-14 RX ADMIN — FENTANYL CITRATE 150 MCG: 50 INJECTION INTRAMUSCULAR; INTRAVENOUS at 06:55

## 2021-09-14 RX ADMIN — DEXMEDETOMIDINE HYDROCHLORIDE 1 MCG/KG/HR: 4 INJECTION, SOLUTION INTRAVENOUS at 07:13

## 2021-09-14 RX ADMIN — MUPIROCIN 1 APPLICATION: 20 OINTMENT TOPICAL at 13:10

## 2021-09-14 RX ADMIN — PROTAMINE SULFATE 300 MG: 10 INJECTION, SOLUTION INTRAVENOUS at 11:36

## 2021-09-14 RX ADMIN — CHLORHEXIDINE GLUCONATE 15 ML: 1.2 RINSE ORAL at 05:55

## 2021-09-14 RX ADMIN — SODIUM CHLORIDE 30 ML/HR: 9 INJECTION, SOLUTION INTRAVENOUS at 13:07

## 2021-09-14 RX ADMIN — FENTANYL CITRATE 100 MCG: 50 INJECTION INTRAMUSCULAR; INTRAVENOUS at 11:49

## 2021-09-14 RX ADMIN — Medication 50 MCG/KG/MIN: at 08:45

## 2021-09-14 RX ADMIN — SODIUM CHLORIDE, PRESERVATIVE FREE 30 ML: 5 INJECTION INTRAVENOUS at 14:33

## 2021-09-14 RX ADMIN — ALBUMIN HUMAN 250 ML: 0.05 INJECTION, SOLUTION INTRAVENOUS at 13:28

## 2021-09-14 RX ADMIN — SODIUM CHLORIDE 30 ML/HR: 9 INJECTION, SOLUTION INTRAVENOUS at 20:37

## 2021-09-14 RX ADMIN — AMINOCAPROIC ACID 10 G: 250 INJECTION, SOLUTION INTRAVENOUS at 07:35

## 2021-09-14 RX ADMIN — LIDOCAINE HYDROCHLORIDE 100 MG: 20 INJECTION, SOLUTION EPIDURAL; INFILTRATION; INTRACAUDAL; PERINEURAL at 06:55

## 2021-09-14 RX ADMIN — FENTANYL CITRATE 100 MCG: 50 INJECTION INTRAMUSCULAR; INTRAVENOUS at 07:37

## 2021-09-14 RX ADMIN — FAMOTIDINE 20 MG: 10 INJECTION INTRAVENOUS at 05:53

## 2021-09-14 RX ADMIN — ALBUMIN HUMAN 250 ML: 0.05 INJECTION, SOLUTION INTRAVENOUS at 15:20

## 2021-09-14 RX ADMIN — ROCURONIUM BROMIDE 20 MG: 50 INJECTION INTRAVENOUS at 11:03

## 2021-09-14 RX ADMIN — SODIUM CHLORIDE 1 UNITS/HR: 9 INJECTION, SOLUTION INTRAVENOUS at 09:37

## 2021-09-14 RX ADMIN — ROCURONIUM BROMIDE 100 MG: 50 INJECTION INTRAVENOUS at 06:56

## 2021-09-14 RX ADMIN — ALBUMIN HUMAN 250 ML: 0.05 INJECTION, SOLUTION INTRAVENOUS at 14:00

## 2021-09-14 RX ADMIN — METOCLOPRAMIDE HYDROCHLORIDE 10 MG: 5 INJECTION INTRAMUSCULAR; INTRAVENOUS at 19:24

## 2021-09-14 RX ADMIN — CEFAZOLIN SODIUM 2 G: 2 INJECTION, SOLUTION INTRAVENOUS at 11:39

## 2021-09-14 RX ADMIN — ATORVASTATIN CALCIUM 40 MG: 20 TABLET, FILM COATED ORAL at 22:00

## 2021-09-14 RX ADMIN — SODIUM BICARBONATE 50 MEQ: 84 INJECTION, SOLUTION INTRAVENOUS at 20:58

## 2021-09-14 RX ADMIN — METHADONE HYDROCHLORIDE 10 MG: 10 TABLET ORAL at 05:52

## 2021-09-14 RX ADMIN — PANTOPRAZOLE SODIUM 40 MG: 40 INJECTION, POWDER, FOR SOLUTION INTRAVENOUS at 13:05

## 2021-09-14 RX ADMIN — MUPIROCIN 1 APPLICATION: 20 OINTMENT TOPICAL at 20:25

## 2021-09-14 RX ADMIN — MAGNESIUM SULFATE HEPTAHYDRATE 2 G: 500 INJECTION, SOLUTION INTRAMUSCULAR; INTRAVENOUS at 11:08

## 2021-09-14 RX ADMIN — CEFAZOLIN SODIUM 2 G: 2 INJECTION, SOLUTION INTRAVENOUS at 07:19

## 2021-09-14 RX ADMIN — NICARDIPINE HYDROCHLORIDE 5 MG/HR: 2.5 INJECTION, SOLUTION INTRAVENOUS at 07:35

## 2021-09-14 RX ADMIN — ALBUMIN HUMAN 250 ML: 0.05 INJECTION, SOLUTION INTRAVENOUS at 21:29

## 2021-09-14 RX ADMIN — HEPARIN SODIUM 10000 UNITS: 1000 INJECTION INTRAVENOUS; SUBCUTANEOUS at 08:28

## 2021-09-14 RX ADMIN — HEPARIN SODIUM 30000 UNITS: 1000 INJECTION INTRAVENOUS; SUBCUTANEOUS at 08:18

## 2021-09-14 NOTE — ANESTHESIA PROCEDURE NOTES
Preanesthesia Checklist:  Patient identified, IV assessed, risks and benefits discussed, monitors and equipment assessed, procedure being performed at surgeon's request and anesthesia consent obtained.    General Procedure Information  Diagnostic Indications for Echo:  assessment of ascending aorta, assessment of surgical repair, defect repair evaluation and hemodynamic monitoring  Physician Requesting Echo: Jr Flaco Yu MD  Location performed:  OR  Intubated  Bite block placed  Heart visualized  Probe Insertion:  Easy  Probe Type:  Multiplane  Modalities:  2D only, color flow mapping, continuous wave Doppler and pulse wave Doppler    Echocardiographic and Doppler Measurements    Ventricles    Right Ventricle:  Cavity size dilated.  Diastolic dimension 4.3 cm.  Hypertrophy not present.  Thrombus not present.  Global function normal.    Left Ventricle:  Cavity size normal.  Hypertrophy not present.  Thrombus not present.  Global Function normal.      Ventricular Regional Function:  1- Basal Anteroseptal:  normal  2- Basal Anterior:  normal  3- Basal Anterolateral:  normal  4- Basal Inferolateral:  normal  5- Basal Inferior:  normal  6- Basal Inferoseptal:  normal  7- Mid Anteroseptal:  normal  8- Mid Anterior:  normal  9- Mid Anterolateral:  normal  10- Mid Inferolateral:  normal  11- Mid Inferior:  normal  12- Mid Inferoseptal:  normal  13- Apical Anterior:  normal  14- Apical Lateral:  normal  15- Apical Inferior:  normal  16- Apical Septal:  normal  17- [place]:  normal      Valves    Aortic Valve:  Annulus normal.  Stenosis not present.  Regurgitation mild.  Leaflets thickened.  Leaflet motions normal.      Mitral Valve:  Annulus calcified.  Stenosis not present.  Mean Gradient: 1 mmHg.  Vena Contracta Width: 0.2 cm.  Regurgitation moderate.  Leaflets perforated.  Leaflet motions flail.  Specific leaflet segments with abnormal motions are described in the following comments:       P2    Tricuspid  Valve:  Annulus normal.  Stenosis not present.  Regurgitation trace.  Leaflets normal.  Leaflet motions normal.    Pulmonic Valve:  Annulus normal.  Stenosis not present.  Regurgitation mild.          Aorta    Ascending Aorta:  Size normal.  Dissection not present.  Plaque thickness less than 3 mm.  Mobile plaque not present.    Aortic Arch:  Size normal.  Dissection not present.  Plaque thickness greater than 3 mm.  Mobile plaque not present.    Descending Aorta:  Size normal.  Dissection not present.  Plaque thickness greater than 3 mm.  Mobile plaque not present.          Atria    Right Atrium:  Size dilated.  Spontaneous echo contrast not present.  Thrombus not present.  Tumor not present.    Left Atrium:  Size dilated.  Spontaneous echo contrast not present.  Thrombus not present.  Tumor not present.  Left atrial appendage normal.  Other Atria Findings:       LINUS exit velocity 40 cm/s    Septa    Atrial Septum:  Intra-atrial septal morphology normal.      Ventricular Septum:  Intra-ventricular septum morphology normal.      Diastolic Function Measurements:  Diastolic Dysfunction Grade= indeterminate  E= ms  A= ms  E/A Ratio=   DT= ms  S/D=  IVRT=    Other Findings  Pericardium:  normal  Pleural Effusion:  none  Pulmonary Arteries:  normal  Pulmonary Venous Flow:  blunted (decreased) systolic flow    Anesthesia Information  Performed Personally  Anesthesiologist:  Ted Ken MD    Surgeon:  Jr Flaco Yu MD    Echocardiogram Comments:       76 year old male with multivessel CAD, MR and AF presents for CAB, MV repair and MAZE/LINUS closure.  - Normal LV size, function; LVEF >55%, no RWMAs  - Moderately dilated RV (mid 4.2 cm), normal RV function (TAPSE 2.4 cm)  - Moderate MR; obvious flail P2 component with prolapse, eccentric anteriorly directed MR jet, small VC 0.2 cm however +dilated LA, severely blunted pulmonary flow (RUPV, LUPV)  - Mild AI, mild PI, trace TR  - LINUS without evidence of  SEC/thrombus, exit velocity 40 cm/s  - No intracardiac shunt  - No pericardial/pleural effusion  - No aortic dissection    S/p MVr (38 mm ring), CAB x5, LINUS resection, MAZE:  - Normal LV size, function; LVEF >55%, no RWMAs  - Mildly dilated RV, mildly reduced RV function (free wall remains normal)  - s/p 38 mm MVr; leaflets moving freely, no residual valvular MR, no MS (gradient 3 mmHg, HR 80, MAP 80)  - Mild AI, mild PI, trace TR (unchanged from prior)  - LINUS appears completely resected without residual flow via CFD  - No intracardiac shunt  - No pericardial/pleural effusion  - No aortic dissection    Findings discussed with surgical team

## 2021-09-14 NOTE — ANESTHESIA PROCEDURE NOTES
Arterial Line      Patient reassessed immediately prior to procedure    Patient location during procedure: pre-op  Start time: 9/14/2021 6:45 AM  Stop Time:9/14/2021 6:52 AM       Line placed for hemodynamic monitoring and ABGs/Labs/ISTAT.  Performed By   Anesthesiologist: Ted Ken MD  Preanesthetic Checklist  Completed: patient identified, IV checked, site marked, risks and benefits discussed, surgical consent, monitors and equipment checked, pre-op evaluation and timeout performed  Arterial Line Prep   Sterile Tech: gloves, mask, cap and sterile barriers  Prep: ChloraPrep  Patient monitoring: blood pressure monitoring, continuous pulse oximetry and EKG  Arterial Line Procedure   Laterality:left  Location:  radial artery  Catheter size: 20 G   Guidance: ultrasound guided  PROCEDURE NOTE/ULTRASOUND INTERPRETATION.  Using ultrasound guidance the potential vascular sites for insertion of the catheter were visualized to determine the patency of the vessel to be used for vascular access.  After selecting the appropriate site for insertion, the needle was visualized under ultrasound being inserted into the radial artery, followed by ultrasound confirmation of wire and catheter placement. There were no abnormalities seen on ultrasound; an image was taken; and the patient tolerated the procedure with no complications.   Number of attempts: 2  Successful placement: yes  Post Assessment   Dressing Type: occlusive dressing applied, secured with tape and wrist guard applied.   Complications no  Circ/Move/Sens Assessment: unchanged.   Patient Tolerance: patient tolerated the procedure well with no apparent complications

## 2021-09-14 NOTE — ANESTHESIA PROCEDURE NOTES
Airway  Urgency: elective    Date/Time: 9/14/2021 6:59 AM  Airway not difficult    General Information and Staff    Patient location during procedure: OR  Anesthesiologist: Ted Ken MD    Indications and Patient Condition  Indications for airway management: airway protection    Preoxygenated: yes  MILS maintained throughout  Mask difficulty assessment: 1 - vent by mask    Final Airway Details  Final airway type: endotracheal airway      Successful airway: ETT  Cuffed: yes   Successful intubation technique: direct laryngoscopy  Endotracheal tube insertion site: oral  Blade: Jemma  Blade size: 4  ETT size (mm): 8.0  Cormack-Lehane Classification: grade I - full view of glottis  Placement verified by: chest auscultation and capnometry   Cuff volume (mL): 8  Measured from: teeth  ETT/EBT  to teeth (cm): 24  Number of attempts at approach: 1  Assessment: lips, teeth, and gum same as pre-op and atraumatic intubation

## 2021-09-14 NOTE — ANESTHESIA POSTPROCEDURE EVALUATION
Patient: Lucas Maynard    Procedure Summary     Date: 09/14/21 Room / Location: [place] [id number] / [facility] CARDIOVASCULAR OPERATING ROOM    Anesthesia Start: 0637 Anesthesia Stop: 1246    Procedure: INTRAOP GIRMA, CORONARY ARTERY BYPASS GRAFTING X 5 UTILIZING LEFT SANTOS AND ENDOSCOPICALLY HARVESTED RIGHT SAPHENOUS VEIN, MITRAL VALVE REPAIR/REPLACEMENT, MAZE PROCEDURE,  ATRIAL APPENDAGE CLOSURE, AND PRP (N/A Chest) Diagnosis:       Coronary artery disease of native artery of native heart with stable angina pectoris (CMS/HCC)      (Coronary artery disease of native artery of native heart with stable angina pectoris (CMS/HCC) [I25.118])    Surgeons: Jr Flaco Yu MD Provider: Ted Ken MD    Anesthesia Type: general ASA Status: 4          Anesthesia Type: general    Vitals  Vitals Value Taken Time   BP 96/71 09/14/21 1244   Temp     Pulse 77 09/14/21 1246   Resp     SpO2 100 % 09/14/21 1246   Vitals shown include unvalidated device data.        Post Anesthesia Care and Evaluation    Patient location during evaluation: ICU  Patient participation: complete - patient cannot participate  Pain management: adequate  Airway patency: Intubated.  Anesthetic complications: No anesthetic complications  PONV Status: controlled  Cardiovascular status: acceptable  Respiratory status: ETT  Hydration status: acceptable

## 2021-09-14 NOTE — ANESTHESIA PROCEDURE NOTES
Central Line      Patient location during procedure: OR  Staff  Anesthesiologist: Ted Ken MD  Preanesthetic Checklist  Completed: patient identified, IV checked, site marked, risks and benefits discussed, surgical consent, monitors and equipment checked, pre-op evaluation and timeout performed  Central Line Prep  Sterile Tech:gloves, cap, gown, mask and sterile barriers  Prep: chloraprep  Patient monitoring: blood pressure monitoring, continuous pulse oximetry and EKG  Central Line Procedure  Laterality:right  Location:internal jugular  Catheter Type:double lumen and MAC  Catheter Size:9 Fr  Guidance:ultrasound guided  PROCEDURE NOTE/ULTRASOUND INTERPRETATION.  Using ultrasound guidance the potential vascular sites for insertion of the catheter were visualized to determine the patency of the vessel to be used for vascular access.  After selecting the appropriate site for insertion, the needle was visualized under ultrasound being inserted into the internal jugular vein, followed by ultrasound confirmation of wire and catheter placement. There were no abnormalities seen on ultrasound; an image was taken; and the patient tolerated the procedure with no complications. Images: still images obtained, printed/placed on chart  Assessment  Post procedure:biopatch applied, line sutured and occlusive dressing applied  Assessement:blood return through all ports, free fluid flow and chest x-ray ordered  Complications:no  Patient Tolerance:patient tolerated the procedure well with no apparent complications

## 2021-09-15 ENCOUNTER — APPOINTMENT (OUTPATIENT)
Dept: ULTRASOUND IMAGING | Facility: HOSPITAL | Age: 76
End: 2021-09-15

## 2021-09-15 ENCOUNTER — APPOINTMENT (OUTPATIENT)
Dept: GENERAL RADIOLOGY | Facility: HOSPITAL | Age: 76
End: 2021-09-15

## 2021-09-15 LAB
ALBUMIN SERPL-MCNC: 3.8 G/DL (ref 3.5–5.2)
ALBUMIN SERPL-MCNC: 4 G/DL (ref 3.5–5.2)
ALBUMIN SERPL-MCNC: 4.2 G/DL (ref 3.5–5.2)
ANION GAP SERPL CALCULATED.3IONS-SCNC: 12.8 MMOL/L (ref 5–15)
ANION GAP SERPL CALCULATED.3IONS-SCNC: 15 MMOL/L (ref 5–15)
ANION GAP SERPL CALCULATED.3IONS-SCNC: 17.1 MMOL/L (ref 5–15)
ARTERIAL PATENCY WRIST A: ABNORMAL
ATMOSPHERIC PRESS: 750 MMHG
BACTERIA UR QL AUTO: ABNORMAL /HPF
BASE EXCESS BLDA CALC-SCNC: -4.1 MMOL/L (ref 0–2)
BDY SITE: ABNORMAL
BILIRUB UR QL STRIP: NEGATIVE
BUN SERPL-MCNC: 34 MG/DL (ref 8–23)
BUN SERPL-MCNC: 39 MG/DL (ref 8–23)
BUN SERPL-MCNC: 42 MG/DL (ref 8–23)
BUN/CREAT SERPL: 14.1 (ref 7–25)
BUN/CREAT SERPL: 16.3 (ref 7–25)
BUN/CREAT SERPL: 17.3 (ref 7–25)
CA-I BLD-MCNC: 4.9 MG/DL (ref 4.6–5.4)
CA-I SERPL ISE-MCNC: 1.22 MMOL/L (ref 1.15–1.35)
CALCIUM SPEC-SCNC: 8.2 MG/DL (ref 8.6–10.5)
CALCIUM SPEC-SCNC: 8.5 MG/DL (ref 8.6–10.5)
CALCIUM SPEC-SCNC: 8.7 MG/DL (ref 8.6–10.5)
CHLORIDE SERPL-SCNC: 104 MMOL/L (ref 98–107)
CHLORIDE SERPL-SCNC: 107 MMOL/L (ref 98–107)
CHLORIDE SERPL-SCNC: 107 MMOL/L (ref 98–107)
CLARITY UR: CLEAR
CO2 SERPL-SCNC: 18.9 MMOL/L (ref 22–29)
CO2 SERPL-SCNC: 20 MMOL/L (ref 22–29)
CO2 SERPL-SCNC: 20.2 MMOL/L (ref 22–29)
COLOR UR: YELLOW
CREAT SERPL-MCNC: 1.96 MG/DL (ref 0.76–1.27)
CREAT SERPL-MCNC: 2.39 MG/DL (ref 0.76–1.27)
CREAT SERPL-MCNC: 2.97 MG/DL (ref 0.76–1.27)
DEPRECATED RDW RBC AUTO: 41.8 FL (ref 37–54)
DEPRECATED RDW RBC AUTO: 43.8 FL (ref 37–54)
ERYTHROCYTE [DISTWIDTH] IN BLOOD BY AUTOMATED COUNT: 13 % (ref 12.3–15.4)
ERYTHROCYTE [DISTWIDTH] IN BLOOD BY AUTOMATED COUNT: 13.4 % (ref 12.3–15.4)
GAS FLOW AIRWAY: 4 LPM
GFR SERPL CREATININE-BSD FRML MDRD: 21 ML/MIN/1.73
GFR SERPL CREATININE-BSD FRML MDRD: 26 ML/MIN/1.73
GFR SERPL CREATININE-BSD FRML MDRD: 33 ML/MIN/1.73
GLUCOSE BLDC GLUCOMTR-MCNC: 129 MG/DL (ref 70–130)
GLUCOSE BLDC GLUCOMTR-MCNC: 135 MG/DL (ref 70–130)
GLUCOSE BLDC GLUCOMTR-MCNC: 150 MG/DL (ref 70–130)
GLUCOSE BLDC GLUCOMTR-MCNC: 150 MG/DL (ref 70–130)
GLUCOSE BLDC GLUCOMTR-MCNC: 151 MG/DL (ref 70–130)
GLUCOSE BLDC GLUCOMTR-MCNC: 153 MG/DL (ref 70–130)
GLUCOSE BLDC GLUCOMTR-MCNC: 155 MG/DL (ref 70–130)
GLUCOSE BLDC GLUCOMTR-MCNC: 155 MG/DL (ref 70–130)
GLUCOSE BLDC GLUCOMTR-MCNC: 157 MG/DL (ref 70–130)
GLUCOSE BLDC GLUCOMTR-MCNC: 160 MG/DL (ref 70–130)
GLUCOSE BLDC GLUCOMTR-MCNC: 174 MG/DL (ref 70–130)
GLUCOSE BLDC GLUCOMTR-MCNC: 193 MG/DL (ref 70–130)
GLUCOSE BLDC GLUCOMTR-MCNC: 212 MG/DL (ref 70–130)
GLUCOSE SERPL-MCNC: 128 MG/DL (ref 65–99)
GLUCOSE SERPL-MCNC: 154 MG/DL (ref 65–99)
GLUCOSE SERPL-MCNC: 195 MG/DL (ref 65–99)
GLUCOSE UR STRIP-MCNC: NEGATIVE MG/DL
HCO3 BLDA-SCNC: 20.8 MMOL/L (ref 22–28)
HCT VFR BLD AUTO: 25.1 % (ref 37.5–51)
HCT VFR BLD AUTO: 27.8 % (ref 37.5–51)
HGB BLD-MCNC: 8.7 G/DL (ref 13–17.7)
HGB BLD-MCNC: 9.6 G/DL (ref 13–17.7)
HGB UR QL STRIP.AUTO: ABNORMAL
HYALINE CASTS UR QL AUTO: ABNORMAL /LPF
INR PPP: 1.44 (ref 0.9–1.1)
KETONES UR QL STRIP: ABNORMAL
LAB AP CASE REPORT: NORMAL
LEUKOCYTE ESTERASE UR QL STRIP.AUTO: NEGATIVE
LYMPHOCYTES # BLD MANUAL: 0.64 10*3/MM3 (ref 0.7–3.1)
LYMPHOCYTES NFR BLD MANUAL: 4 % (ref 5–12)
LYMPHOCYTES NFR BLD MANUAL: 7 % (ref 19.6–45.3)
MAGNESIUM SERPL-MCNC: 2.4 MG/DL (ref 1.6–2.4)
MAGNESIUM SERPL-MCNC: 2.5 MG/DL (ref 1.6–2.4)
MCH RBC QN AUTO: 30.7 PG (ref 26.6–33)
MCH RBC QN AUTO: 31 PG (ref 26.6–33)
MCHC RBC AUTO-ENTMCNC: 34.5 G/DL (ref 31.5–35.7)
MCHC RBC AUTO-ENTMCNC: 34.7 G/DL (ref 31.5–35.7)
MCV RBC AUTO: 88.7 FL (ref 79–97)
MCV RBC AUTO: 89.7 FL (ref 79–97)
MODALITY: ABNORMAL
MONOCYTES # BLD AUTO: 0.36 10*3/MM3 (ref 0.1–0.9)
NEUTROPHILS # BLD AUTO: 8.08 10*3/MM3 (ref 1.7–7)
NEUTROPHILS NFR BLD MANUAL: 89 % (ref 42.7–76)
NITRITE UR QL STRIP: NEGATIVE
NRBC BLD AUTO-RTO: 0 /100 WBC (ref 0–0.2)
PATH REPORT.FINAL DX SPEC: NORMAL
PATH REPORT.GROSS SPEC: NORMAL
PCO2 BLDA: 36.1 MM HG (ref 35–45)
PH BLDA: 7.37 PH UNITS (ref 7.35–7.45)
PH UR STRIP.AUTO: <=5 [PH] (ref 5–8)
PHOSPHATE SERPL-MCNC: 5.4 MG/DL (ref 2.5–4.5)
PHOSPHATE SERPL-MCNC: 5.6 MG/DL (ref 2.5–4.5)
PHOSPHATE SERPL-MCNC: 6.3 MG/DL (ref 2.5–4.5)
PLAT MORPH BLD: NORMAL
PLATELET # BLD AUTO: 101 10*3/MM3 (ref 140–450)
PLATELET # BLD AUTO: 109 10*3/MM3 (ref 140–450)
PMV BLD AUTO: 10 FL (ref 6–12)
PMV BLD AUTO: 11 FL (ref 6–12)
PO2 BLDA: 60.6 MM HG (ref 80–100)
POTASSIUM SERPL-SCNC: 3.9 MMOL/L (ref 3.5–5.2)
POTASSIUM SERPL-SCNC: 4.1 MMOL/L (ref 3.5–5.2)
POTASSIUM SERPL-SCNC: 4.4 MMOL/L (ref 3.5–5.2)
PROT UR QL STRIP: NEGATIVE
PROTHROMBIN TIME: 17.3 SECONDS (ref 11.7–14.2)
QT INTERVAL: 437 MS
RBC # BLD AUTO: 2.83 10*6/MM3 (ref 4.14–5.8)
RBC # BLD AUTO: 3.1 10*6/MM3 (ref 4.14–5.8)
RBC # UR: ABNORMAL /HPF
RBC MORPH BLD: NORMAL
REF LAB TEST METHOD: ABNORMAL
SAO2 % BLDCOA: 90.2 % (ref 92–99)
SODIUM SERPL-SCNC: 140 MMOL/L (ref 136–145)
SODIUM SERPL-SCNC: 140 MMOL/L (ref 136–145)
SODIUM SERPL-SCNC: 142 MMOL/L (ref 136–145)
SODIUM UR-SCNC: 45 MMOL/L
SP GR UR STRIP: 1.02 (ref 1–1.03)
SQUAMOUS #/AREA URNS HPF: ABNORMAL /HPF
TOTAL RATE: 16 BREATHS/MINUTE
UROBILINOGEN UR QL STRIP: ABNORMAL
WBC # BLD AUTO: 8.82 10*3/MM3 (ref 3.4–10.8)
WBC # BLD AUTO: 9.08 10*3/MM3 (ref 3.4–10.8)
WBC MORPH BLD: NORMAL
WBC UR QL AUTO: ABNORMAL /HPF

## 2021-09-15 PROCEDURE — 85027 COMPLETE CBC AUTOMATED: CPT | Performed by: HOSPITALIST

## 2021-09-15 PROCEDURE — 25010000002 NALOXONE PER 1 MG: Performed by: NURSE PRACTITIONER

## 2021-09-15 PROCEDURE — 97530 THERAPEUTIC ACTIVITIES: CPT

## 2021-09-15 PROCEDURE — 85007 BL SMEAR W/DIFF WBC COUNT: CPT | Performed by: NURSE PRACTITIONER

## 2021-09-15 PROCEDURE — 93010 ELECTROCARDIOGRAM REPORT: CPT | Performed by: INTERNAL MEDICINE

## 2021-09-15 PROCEDURE — 81001 URINALYSIS AUTO W/SCOPE: CPT | Performed by: HOSPITALIST

## 2021-09-15 PROCEDURE — 63710000001 INSULIN LISPRO (HUMAN) PER 5 UNITS: Performed by: NURSE PRACTITIONER

## 2021-09-15 PROCEDURE — 82803 BLOOD GASES ANY COMBINATION: CPT

## 2021-09-15 PROCEDURE — 84300 ASSAY OF URINE SODIUM: CPT | Performed by: HOSPITALIST

## 2021-09-15 PROCEDURE — 25010000002 ENOXAPARIN PER 10 MG: Performed by: NURSE PRACTITIONER

## 2021-09-15 PROCEDURE — 71045 X-RAY EXAM CHEST 1 VIEW: CPT

## 2021-09-15 PROCEDURE — 94799 UNLISTED PULMONARY SVC/PX: CPT

## 2021-09-15 PROCEDURE — 97162 PT EVAL MOD COMPLEX 30 MIN: CPT

## 2021-09-15 PROCEDURE — 25010000003 MILRINONE LACTATE IN DEXTROSE 20-5 MG/100ML-% SOLUTION: Performed by: NURSE PRACTITIONER

## 2021-09-15 PROCEDURE — 82962 GLUCOSE BLOOD TEST: CPT

## 2021-09-15 PROCEDURE — 93005 ELECTROCARDIOGRAM TRACING: CPT | Performed by: NURSE PRACTITIONER

## 2021-09-15 PROCEDURE — 25010000003 CEFAZOLIN IN DEXTROSE 2-4 GM/100ML-% SOLUTION: Performed by: THORACIC SURGERY (CARDIOTHORACIC VASCULAR SURGERY)

## 2021-09-15 PROCEDURE — 25010000002 ALBUMIN HUMAN 5% PER 50 ML: Performed by: NURSE PRACTITIONER

## 2021-09-15 PROCEDURE — 83735 ASSAY OF MAGNESIUM: CPT | Performed by: NURSE PRACTITIONER

## 2021-09-15 PROCEDURE — 80069 RENAL FUNCTION PANEL: CPT | Performed by: NURSE PRACTITIONER

## 2021-09-15 PROCEDURE — 85610 PROTHROMBIN TIME: CPT | Performed by: NURSE PRACTITIONER

## 2021-09-15 PROCEDURE — 85025 COMPLETE CBC W/AUTO DIFF WBC: CPT | Performed by: NURSE PRACTITIONER

## 2021-09-15 PROCEDURE — 83735 ASSAY OF MAGNESIUM: CPT | Performed by: HOSPITALIST

## 2021-09-15 PROCEDURE — P9041 ALBUMIN (HUMAN),5%, 50ML: HCPCS | Performed by: NURSE PRACTITIONER

## 2021-09-15 PROCEDURE — 76775 US EXAM ABDO BACK WALL LIM: CPT

## 2021-09-15 PROCEDURE — 82330 ASSAY OF CALCIUM: CPT | Performed by: NURSE PRACTITIONER

## 2021-09-15 PROCEDURE — 99222 1ST HOSP IP/OBS MODERATE 55: CPT | Performed by: INTERNAL MEDICINE

## 2021-09-15 PROCEDURE — 94640 AIRWAY INHALATION TREATMENT: CPT

## 2021-09-15 PROCEDURE — 80069 RENAL FUNCTION PANEL: CPT | Performed by: HOSPITALIST

## 2021-09-15 PROCEDURE — 25010000002 METOCLOPRAMIDE PER 10 MG: Performed by: NURSE PRACTITIONER

## 2021-09-15 PROCEDURE — 25010000002 ONDANSETRON PER 1 MG: Performed by: NURSE PRACTITIONER

## 2021-09-15 PROCEDURE — 80069 RENAL FUNCTION PANEL: CPT | Performed by: THORACIC SURGERY (CARDIOTHORACIC VASCULAR SURGERY)

## 2021-09-15 PROCEDURE — 25010000002 CALCIUM GLUCONATE-NACL 1-0.675 GM/50ML-% SOLUTION: Performed by: NURSE PRACTITIONER

## 2021-09-15 RX ORDER — ALBUMIN, HUMAN INJ 5% 5 %
250 SOLUTION INTRAVENOUS ONCE
Status: COMPLETED | OUTPATIENT
Start: 2021-09-15 | End: 2021-09-15

## 2021-09-15 RX ORDER — INSULIN LISPRO 100 [IU]/ML
0-9 INJECTION, SOLUTION INTRAVENOUS; SUBCUTANEOUS
Status: DISCONTINUED | OUTPATIENT
Start: 2021-09-15 | End: 2021-09-19

## 2021-09-15 RX ORDER — DOPAMINE HYDROCHLORIDE 160 MG/100ML
3 INJECTION, SOLUTION INTRAVENOUS
Status: DISCONTINUED | OUTPATIENT
Start: 2021-09-15 | End: 2021-09-16

## 2021-09-15 RX ORDER — SODIUM CHLORIDE 450 MG/100ML
75 INJECTION, SOLUTION INTRAVENOUS CONTINUOUS
Status: DISCONTINUED | OUTPATIENT
Start: 2021-09-15 | End: 2021-09-20

## 2021-09-15 RX ORDER — IPRATROPIUM BROMIDE AND ALBUTEROL SULFATE 2.5; .5 MG/3ML; MG/3ML
3 SOLUTION RESPIRATORY (INHALATION)
Status: DISCONTINUED | OUTPATIENT
Start: 2021-09-15 | End: 2021-09-16

## 2021-09-15 RX ORDER — HYDROCODONE BITARTRATE AND ACETAMINOPHEN 5; 325 MG/1; MG/1
1 TABLET ORAL EVERY 4 HOURS PRN
Status: DISCONTINUED | OUTPATIENT
Start: 2021-09-15 | End: 2021-09-22 | Stop reason: HOSPADM

## 2021-09-15 RX ORDER — SODIUM CHLORIDE FOR INHALATION 3 %
4 VIAL, NEBULIZER (ML) INHALATION
Status: COMPLETED | OUTPATIENT
Start: 2021-09-15 | End: 2021-09-16

## 2021-09-15 RX ORDER — GUAIFENESIN 600 MG/1
1200 TABLET, EXTENDED RELEASE ORAL EVERY 12 HOURS SCHEDULED
Status: DISCONTINUED | OUTPATIENT
Start: 2021-09-15 | End: 2021-09-22 | Stop reason: HOSPADM

## 2021-09-15 RX ORDER — SODIUM CHLORIDE FOR INHALATION 3 %
4 VIAL, NEBULIZER (ML) INHALATION ONCE
Status: DISCONTINUED | OUTPATIENT
Start: 2021-09-15 | End: 2021-09-15

## 2021-09-15 RX ORDER — DEXTROSE MONOHYDRATE 25 G/50ML
25 INJECTION, SOLUTION INTRAVENOUS
Status: DISCONTINUED | OUTPATIENT
Start: 2021-09-15 | End: 2021-09-22 | Stop reason: HOSPADM

## 2021-09-15 RX ORDER — BUMETANIDE 0.25 MG/ML
2 INJECTION INTRAMUSCULAR; INTRAVENOUS ONCE
Status: COMPLETED | OUTPATIENT
Start: 2021-09-15 | End: 2021-09-15

## 2021-09-15 RX ORDER — SODIUM CHLORIDE, SODIUM LACTATE, POTASSIUM CHLORIDE, CALCIUM CHLORIDE 600; 310; 30; 20 MG/100ML; MG/100ML; MG/100ML; MG/100ML
75 INJECTION, SOLUTION INTRAVENOUS CONTINUOUS
Status: ACTIVE | OUTPATIENT
Start: 2021-09-15 | End: 2021-09-15

## 2021-09-15 RX ORDER — TRAMADOL HYDROCHLORIDE 50 MG/1
25 TABLET ORAL EVERY 6 HOURS PRN
Status: DISCONTINUED | OUTPATIENT
Start: 2021-09-15 | End: 2021-09-22 | Stop reason: HOSPADM

## 2021-09-15 RX ORDER — NICOTINE POLACRILEX 4 MG
15 LOZENGE BUCCAL
Status: DISCONTINUED | OUTPATIENT
Start: 2021-09-15 | End: 2021-09-22 | Stop reason: HOSPADM

## 2021-09-15 RX ORDER — CALCIUM GLUCONATE 20 MG/ML
2 INJECTION, SOLUTION INTRAVENOUS ONCE
Status: COMPLETED | OUTPATIENT
Start: 2021-09-15 | End: 2021-09-15

## 2021-09-15 RX ADMIN — INSULIN LISPRO 2 UNITS: 100 INJECTION, SOLUTION INTRAVENOUS; SUBCUTANEOUS at 20:35

## 2021-09-15 RX ADMIN — GUAIFENESIN 1200 MG: 600 TABLET, EXTENDED RELEASE ORAL at 20:07

## 2021-09-15 RX ADMIN — IPRATROPIUM BROMIDE AND ALBUTEROL SULFATE 3 ML: 2.5; .5 SOLUTION RESPIRATORY (INHALATION) at 23:18

## 2021-09-15 RX ADMIN — ALBUMIN HUMAN 250 ML: 0.05 INJECTION, SOLUTION INTRAVENOUS at 08:58

## 2021-09-15 RX ADMIN — CEFAZOLIN SODIUM 2 G: 2 INJECTION, SOLUTION INTRAVENOUS at 04:03

## 2021-09-15 RX ADMIN — CEFAZOLIN SODIUM 2 G: 2 INJECTION, SOLUTION INTRAVENOUS at 13:00

## 2021-09-15 RX ADMIN — SODIUM CHLORIDE 30 MG/ML INHALATION SOLUTION 4 ML: 30 SOLUTION INHALANT at 23:18

## 2021-09-15 RX ADMIN — MILRINONE LACTATE IN DEXTROSE 0.25 MCG/KG/MIN: 200 INJECTION, SOLUTION INTRAVENOUS at 01:07

## 2021-09-15 RX ADMIN — BUMETANIDE 2 MG: 0.25 INJECTION INTRAMUSCULAR; INTRAVENOUS at 15:23

## 2021-09-15 RX ADMIN — NALOXONE HYDROCHLORIDE 0.2 MG: 0.4 INJECTION, SOLUTION INTRAMUSCULAR; INTRAVENOUS; SUBCUTANEOUS at 10:04

## 2021-09-15 RX ADMIN — CHLORHEXIDINE GLUCONATE 15 ML: 1.2 RINSE ORAL at 13:40

## 2021-09-15 RX ADMIN — IPRATROPIUM BROMIDE AND ALBUTEROL SULFATE 3 ML: 2.5; .5 SOLUTION RESPIRATORY (INHALATION) at 10:45

## 2021-09-15 RX ADMIN — ACETAMINOPHEN 650 MG: 325 TABLET, FILM COATED ORAL at 01:49

## 2021-09-15 RX ADMIN — SODIUM CHLORIDE, POTASSIUM CHLORIDE, SODIUM LACTATE AND CALCIUM CHLORIDE 75 ML/HR: 600; 310; 30; 20 INJECTION, SOLUTION INTRAVENOUS at 06:46

## 2021-09-15 RX ADMIN — ENOXAPARIN SODIUM 30 MG: 30 INJECTION SUBCUTANEOUS at 17:14

## 2021-09-15 RX ADMIN — MUPIROCIN 1 APPLICATION: 20 OINTMENT TOPICAL at 20:07

## 2021-09-15 RX ADMIN — PANTOPRAZOLE SODIUM 40 MG: 40 TABLET, DELAYED RELEASE ORAL at 06:29

## 2021-09-15 RX ADMIN — SODIUM CHLORIDE 75 ML/HR: 4.5 INJECTION, SOLUTION INTRAVENOUS at 18:17

## 2021-09-15 RX ADMIN — CEFAZOLIN SODIUM 2 G: 2 INJECTION, SOLUTION INTRAVENOUS at 20:08

## 2021-09-15 RX ADMIN — ASPIRIN 81 MG: 81 TABLET, COATED ORAL at 10:38

## 2021-09-15 RX ADMIN — MILRINONE LACTATE IN DEXTROSE 0.12 MCG/KG/MIN: 200 INJECTION, SOLUTION INTRAVENOUS at 18:43

## 2021-09-15 RX ADMIN — MUPIROCIN 1 APPLICATION: 20 OINTMENT TOPICAL at 10:39

## 2021-09-15 RX ADMIN — SODIUM CHLORIDE 250 ML: 0.9 INJECTION, SOLUTION INTRAVENOUS at 17:02

## 2021-09-15 RX ADMIN — ATORVASTATIN CALCIUM 40 MG: 20 TABLET, FILM COATED ORAL at 20:07

## 2021-09-15 RX ADMIN — CALCIUM GLUCONATE 2 G: 20 INJECTION, SOLUTION INTRAVENOUS at 08:58

## 2021-09-15 RX ADMIN — INSULIN LISPRO 4 UNITS: 100 INJECTION, SOLUTION INTRAVENOUS; SUBCUTANEOUS at 17:14

## 2021-09-15 RX ADMIN — ONDANSETRON 4 MG: 2 INJECTION INTRAMUSCULAR; INTRAVENOUS at 06:28

## 2021-09-15 RX ADMIN — DOCUSATE SODIUM 50MG AND SENNOSIDES 8.6MG 2 TABLET: 8.6; 5 TABLET, FILM COATED ORAL at 20:07

## 2021-09-15 RX ADMIN — CHLORHEXIDINE GLUCONATE 15 ML: 1.2 RINSE ORAL at 01:50

## 2021-09-15 RX ADMIN — CYCLOBENZAPRINE 10 MG: 10 TABLET, FILM COATED ORAL at 10:40

## 2021-09-15 RX ADMIN — METOCLOPRAMIDE HYDROCHLORIDE 10 MG: 5 INJECTION INTRAMUSCULAR; INTRAVENOUS at 01:49

## 2021-09-15 RX ADMIN — HYDROCODONE BITARTRATE AND ACETAMINOPHEN 2 TABLET: 5; 325 TABLET ORAL at 04:38

## 2021-09-15 RX ADMIN — LEVOTHYROXINE SODIUM 50 MCG: 0.05 TABLET ORAL at 06:29

## 2021-09-15 RX ADMIN — ACETAMINOPHEN 650 MG: 325 TABLET, FILM COATED ORAL at 17:14

## 2021-09-15 RX ADMIN — METOCLOPRAMIDE HYDROCHLORIDE 10 MG: 5 INJECTION INTRAMUSCULAR; INTRAVENOUS at 06:28

## 2021-09-15 RX ADMIN — NALOXONE HYDROCHLORIDE 0.2 MG: 0.4 INJECTION, SOLUTION INTRAMUSCULAR; INTRAVENOUS; SUBCUTANEOUS at 12:02

## 2021-09-15 RX ADMIN — GUAIFENESIN 1200 MG: 600 TABLET, EXTENDED RELEASE ORAL at 10:38

## 2021-09-15 RX ADMIN — SODIUM CHLORIDE 30 MG/ML INHALATION SOLUTION 4 ML: 30 SOLUTION INHALANT at 10:46

## 2021-09-16 ENCOUNTER — APPOINTMENT (OUTPATIENT)
Dept: GENERAL RADIOLOGY | Facility: HOSPITAL | Age: 76
End: 2021-09-16

## 2021-09-16 LAB
ANION GAP SERPL CALCULATED.3IONS-SCNC: 15.9 MMOL/L (ref 5–15)
ARTERIAL PATENCY WRIST A: ABNORMAL
ATMOSPHERIC PRESS: 750.1 MMHG
BASE EXCESS BLDA CALC-SCNC: -4.7 MMOL/L (ref 0–2)
BDY SITE: ABNORMAL
BUN SERPL-MCNC: 44 MG/DL (ref 8–23)
BUN/CREAT SERPL: 13.4 (ref 7–25)
CALCIUM SPEC-SCNC: 8 MG/DL (ref 8.6–10.5)
CHLORIDE SERPL-SCNC: 103 MMOL/L (ref 98–107)
CO2 SERPL-SCNC: 19.1 MMOL/L (ref 22–29)
CREAT SERPL-MCNC: 3.28 MG/DL (ref 0.76–1.27)
DEPRECATED RDW RBC AUTO: 42.9 FL (ref 37–54)
ERYTHROCYTE [DISTWIDTH] IN BLOOD BY AUTOMATED COUNT: 13.3 % (ref 12.3–15.4)
GAS FLOW AIRWAY: 4 LPM
GFR SERPL CREATININE-BSD FRML MDRD: 18 ML/MIN/1.73
GLUCOSE BLDC GLUCOMTR-MCNC: 117 MG/DL (ref 70–130)
GLUCOSE BLDC GLUCOMTR-MCNC: 133 MG/DL (ref 70–130)
GLUCOSE BLDC GLUCOMTR-MCNC: 172 MG/DL (ref 70–130)
GLUCOSE SERPL-MCNC: 149 MG/DL (ref 65–99)
HCO3 BLDA-SCNC: 19.5 MMOL/L (ref 22–28)
HCT VFR BLD AUTO: 24.5 % (ref 37.5–51)
HGB BLD-MCNC: 8.2 G/DL (ref 13–17.7)
MAGNESIUM SERPL-MCNC: 2.3 MG/DL (ref 1.6–2.4)
MCH RBC QN AUTO: 29.9 PG (ref 26.6–33)
MCHC RBC AUTO-ENTMCNC: 33.5 G/DL (ref 31.5–35.7)
MCV RBC AUTO: 89.4 FL (ref 79–97)
MODALITY: ABNORMAL
PCO2 BLDA: 31 MM HG (ref 35–45)
PH BLDA: 7.41 PH UNITS (ref 7.35–7.45)
PHOSPHATE SERPL-MCNC: 6.8 MG/DL (ref 2.5–4.5)
PLATELET # BLD AUTO: 101 10*3/MM3 (ref 140–450)
PMV BLD AUTO: 10.3 FL (ref 6–12)
PO2 BLDA: 71.7 MM HG (ref 80–100)
POTASSIUM SERPL-SCNC: 4.2 MMOL/L (ref 3.5–5.2)
QT INTERVAL: 395 MS
RBC # BLD AUTO: 2.74 10*6/MM3 (ref 4.14–5.8)
SAO2 % BLDCOA: 94.6 % (ref 92–99)
SET MECH RESP RATE: 16
SODIUM SERPL-SCNC: 138 MMOL/L (ref 136–145)
TOTAL RATE: 16 BREATHS/MINUTE
WBC # BLD AUTO: 7.62 10*3/MM3 (ref 3.4–10.8)

## 2021-09-16 PROCEDURE — 93010 ELECTROCARDIOGRAM REPORT: CPT | Performed by: INTERNAL MEDICINE

## 2021-09-16 PROCEDURE — 82962 GLUCOSE BLOOD TEST: CPT

## 2021-09-16 PROCEDURE — 97530 THERAPEUTIC ACTIVITIES: CPT

## 2021-09-16 PROCEDURE — 99232 SBSQ HOSP IP/OBS MODERATE 35: CPT | Performed by: INTERNAL MEDICINE

## 2021-09-16 PROCEDURE — 84100 ASSAY OF PHOSPHORUS: CPT | Performed by: INTERNAL MEDICINE

## 2021-09-16 PROCEDURE — 94799 UNLISTED PULMONARY SVC/PX: CPT

## 2021-09-16 PROCEDURE — 71045 X-RAY EXAM CHEST 1 VIEW: CPT

## 2021-09-16 PROCEDURE — 25010000003 CEFAZOLIN IN DEXTROSE 2-4 GM/100ML-% SOLUTION: Performed by: THORACIC SURGERY (CARDIOTHORACIC VASCULAR SURGERY)

## 2021-09-16 PROCEDURE — 85027 COMPLETE CBC AUTOMATED: CPT | Performed by: NURSE PRACTITIONER

## 2021-09-16 PROCEDURE — 83735 ASSAY OF MAGNESIUM: CPT | Performed by: INTERNAL MEDICINE

## 2021-09-16 PROCEDURE — 82803 BLOOD GASES ANY COMBINATION: CPT

## 2021-09-16 PROCEDURE — 93005 ELECTROCARDIOGRAM TRACING: CPT | Performed by: NURSE PRACTITIONER

## 2021-09-16 PROCEDURE — 63710000001 INSULIN LISPRO (HUMAN) PER 5 UNITS: Performed by: NURSE PRACTITIONER

## 2021-09-16 PROCEDURE — 80048 BASIC METABOLIC PNL TOTAL CA: CPT | Performed by: NURSE PRACTITIONER

## 2021-09-16 RX ORDER — DEXTROSE MONOHYDRATE 25 G/50ML
25 INJECTION, SOLUTION INTRAVENOUS
Status: DISCONTINUED | OUTPATIENT
Start: 2021-09-16 | End: 2021-09-16 | Stop reason: SDUPTHER

## 2021-09-16 RX ORDER — SODIUM CHLORIDE 9 MG/ML
75 INJECTION, SOLUTION INTRAVENOUS CONTINUOUS
Status: DISCONTINUED | OUTPATIENT
Start: 2021-09-16 | End: 2021-09-20

## 2021-09-16 RX ORDER — INSULIN LISPRO 100 [IU]/ML
0-9 INJECTION, SOLUTION INTRAVENOUS; SUBCUTANEOUS
Status: DISCONTINUED | OUTPATIENT
Start: 2021-09-16 | End: 2021-09-16 | Stop reason: SDUPTHER

## 2021-09-16 RX ORDER — IPRATROPIUM BROMIDE AND ALBUTEROL SULFATE 2.5; .5 MG/3ML; MG/3ML
3 SOLUTION RESPIRATORY (INHALATION) 2 TIMES DAILY
Status: DISCONTINUED | OUTPATIENT
Start: 2021-09-16 | End: 2021-09-22 | Stop reason: HOSPADM

## 2021-09-16 RX ORDER — NICOTINE POLACRILEX 4 MG
15 LOZENGE BUCCAL
Status: DISCONTINUED | OUTPATIENT
Start: 2021-09-16 | End: 2021-09-16 | Stop reason: SDUPTHER

## 2021-09-16 RX ADMIN — LEVOTHYROXINE SODIUM 50 MCG: 0.05 TABLET ORAL at 06:15

## 2021-09-16 RX ADMIN — CHLORHEXIDINE GLUCONATE 15 ML: 1.2 RINSE ORAL at 00:55

## 2021-09-16 RX ADMIN — GUAIFENESIN 1200 MG: 600 TABLET, EXTENDED RELEASE ORAL at 20:37

## 2021-09-16 RX ADMIN — CEFAZOLIN SODIUM 2 G: 2 INJECTION, SOLUTION INTRAVENOUS at 04:02

## 2021-09-16 RX ADMIN — DOCUSATE SODIUM 50MG AND SENNOSIDES 8.6MG 2 TABLET: 8.6; 5 TABLET, FILM COATED ORAL at 20:36

## 2021-09-16 RX ADMIN — INSULIN LISPRO 2 UNITS: 100 INJECTION, SOLUTION INTRAVENOUS; SUBCUTANEOUS at 06:47

## 2021-09-16 RX ADMIN — IPRATROPIUM BROMIDE AND ALBUTEROL SULFATE 3 ML: 2.5; .5 SOLUTION RESPIRATORY (INHALATION) at 19:30

## 2021-09-16 RX ADMIN — ATORVASTATIN CALCIUM 40 MG: 20 TABLET, FILM COATED ORAL at 20:36

## 2021-09-16 RX ADMIN — SODIUM CHLORIDE 30 MG/ML INHALATION SOLUTION 4 ML: 30 SOLUTION INHALANT at 07:14

## 2021-09-16 RX ADMIN — CHLORHEXIDINE GLUCONATE 15 ML: 1.2 RINSE ORAL at 13:43

## 2021-09-16 RX ADMIN — GUAIFENESIN 1200 MG: 600 TABLET, EXTENDED RELEASE ORAL at 09:30

## 2021-09-16 RX ADMIN — PANTOPRAZOLE SODIUM 40 MG: 40 TABLET, DELAYED RELEASE ORAL at 06:15

## 2021-09-16 RX ADMIN — MUPIROCIN 1 APPLICATION: 20 OINTMENT TOPICAL at 20:37

## 2021-09-16 RX ADMIN — IPRATROPIUM BROMIDE AND ALBUTEROL SULFATE 3 ML: 2.5; .5 SOLUTION RESPIRATORY (INHALATION) at 07:15

## 2021-09-16 RX ADMIN — METOPROLOL TARTRATE 12.5 MG: 25 TABLET, FILM COATED ORAL at 09:30

## 2021-09-16 RX ADMIN — ACETAMINOPHEN 650 MG: 325 TABLET, FILM COATED ORAL at 23:09

## 2021-09-16 RX ADMIN — MUPIROCIN 1 APPLICATION: 20 OINTMENT TOPICAL at 09:28

## 2021-09-17 ENCOUNTER — APPOINTMENT (OUTPATIENT)
Dept: GENERAL RADIOLOGY | Facility: HOSPITAL | Age: 76
End: 2021-09-17

## 2021-09-17 LAB
ALBUMIN SERPL-MCNC: 3.7 G/DL (ref 3.5–5.2)
ANION GAP SERPL CALCULATED.3IONS-SCNC: 12.4 MMOL/L (ref 5–15)
BUN SERPL-MCNC: 54 MG/DL (ref 8–23)
BUN/CREAT SERPL: 17.9 (ref 7–25)
CALCIUM SPEC-SCNC: 8.2 MG/DL (ref 8.6–10.5)
CHLORIDE SERPL-SCNC: 102 MMOL/L (ref 98–107)
CO2 SERPL-SCNC: 21.6 MMOL/L (ref 22–29)
CREAT SERPL-MCNC: 3.02 MG/DL (ref 0.76–1.27)
DEPRECATED RDW RBC AUTO: 42.9 FL (ref 37–54)
ERYTHROCYTE [DISTWIDTH] IN BLOOD BY AUTOMATED COUNT: 13.4 % (ref 12.3–15.4)
GFR SERPL CREATININE-BSD FRML MDRD: 20 ML/MIN/1.73
GLUCOSE BLDC GLUCOMTR-MCNC: 105 MG/DL (ref 70–130)
GLUCOSE BLDC GLUCOMTR-MCNC: 106 MG/DL (ref 70–130)
GLUCOSE BLDC GLUCOMTR-MCNC: 108 MG/DL (ref 70–130)
GLUCOSE BLDC GLUCOMTR-MCNC: 130 MG/DL (ref 70–130)
GLUCOSE SERPL-MCNC: 109 MG/DL (ref 65–99)
HCT VFR BLD AUTO: 26 % (ref 37.5–51)
HGB BLD-MCNC: 8.6 G/DL (ref 13–17.7)
MCH RBC QN AUTO: 29.3 PG (ref 26.6–33)
MCHC RBC AUTO-ENTMCNC: 33.1 G/DL (ref 31.5–35.7)
MCV RBC AUTO: 88.4 FL (ref 79–97)
PHOSPHATE SERPL-MCNC: 5.2 MG/DL (ref 2.5–4.5)
PLATELET # BLD AUTO: 117 10*3/MM3 (ref 140–450)
PMV BLD AUTO: 10.6 FL (ref 6–12)
POTASSIUM SERPL-SCNC: 4.3 MMOL/L (ref 3.5–5.2)
RBC # BLD AUTO: 2.94 10*6/MM3 (ref 4.14–5.8)
SODIUM SERPL-SCNC: 136 MMOL/L (ref 136–145)
WBC # BLD AUTO: 6.21 10*3/MM3 (ref 3.4–10.8)

## 2021-09-17 PROCEDURE — 85027 COMPLETE CBC AUTOMATED: CPT | Performed by: NURSE PRACTITIONER

## 2021-09-17 PROCEDURE — 71046 X-RAY EXAM CHEST 2 VIEWS: CPT

## 2021-09-17 PROCEDURE — 94799 UNLISTED PULMONARY SVC/PX: CPT

## 2021-09-17 PROCEDURE — 80069 RENAL FUNCTION PANEL: CPT | Performed by: HOSPITALIST

## 2021-09-17 PROCEDURE — 97530 THERAPEUTIC ACTIVITIES: CPT

## 2021-09-17 PROCEDURE — 71045 X-RAY EXAM CHEST 1 VIEW: CPT

## 2021-09-17 PROCEDURE — 25010000002 ENOXAPARIN PER 10 MG: Performed by: NURSE PRACTITIONER

## 2021-09-17 PROCEDURE — 99232 SBSQ HOSP IP/OBS MODERATE 35: CPT | Performed by: INTERNAL MEDICINE

## 2021-09-17 PROCEDURE — 82962 GLUCOSE BLOOD TEST: CPT

## 2021-09-17 RX ORDER — METOPROLOL TARTRATE 50 MG/1
50 TABLET, FILM COATED ORAL EVERY 12 HOURS SCHEDULED
Status: DISCONTINUED | OUTPATIENT
Start: 2021-09-17 | End: 2021-09-18

## 2021-09-17 RX ADMIN — GUAIFENESIN 1200 MG: 600 TABLET, EXTENDED RELEASE ORAL at 08:37

## 2021-09-17 RX ADMIN — ACETAMINOPHEN 650 MG: 325 TABLET, FILM COATED ORAL at 08:42

## 2021-09-17 RX ADMIN — GUAIFENESIN 1200 MG: 600 TABLET, EXTENDED RELEASE ORAL at 21:56

## 2021-09-17 RX ADMIN — LEVOTHYROXINE SODIUM 50 MCG: 0.05 TABLET ORAL at 06:22

## 2021-09-17 RX ADMIN — METOPROLOL TARTRATE 12.5 MG: 25 TABLET, FILM COATED ORAL at 11:50

## 2021-09-17 RX ADMIN — ASPIRIN 81 MG: 81 TABLET, COATED ORAL at 08:37

## 2021-09-17 RX ADMIN — MUPIROCIN 1 APPLICATION: 20 OINTMENT TOPICAL at 08:37

## 2021-09-17 RX ADMIN — DOCUSATE SODIUM 50MG AND SENNOSIDES 8.6MG 2 TABLET: 8.6; 5 TABLET, FILM COATED ORAL at 21:56

## 2021-09-17 RX ADMIN — MUPIROCIN 1 APPLICATION: 20 OINTMENT TOPICAL at 21:56

## 2021-09-17 RX ADMIN — SODIUM CHLORIDE 75 ML/HR: 9 INJECTION, SOLUTION INTRAVENOUS at 07:04

## 2021-09-17 RX ADMIN — ATORVASTATIN CALCIUM 40 MG: 20 TABLET, FILM COATED ORAL at 21:56

## 2021-09-17 RX ADMIN — METOPROLOL TARTRATE 50 MG: 50 TABLET, FILM COATED ORAL at 21:56

## 2021-09-17 RX ADMIN — IPRATROPIUM BROMIDE AND ALBUTEROL SULFATE 3 ML: 2.5; .5 SOLUTION RESPIRATORY (INHALATION) at 06:57

## 2021-09-17 RX ADMIN — IPRATROPIUM BROMIDE AND ALBUTEROL SULFATE 3 ML: 2.5; .5 SOLUTION RESPIRATORY (INHALATION) at 19:58

## 2021-09-17 RX ADMIN — PANTOPRAZOLE SODIUM 40 MG: 40 TABLET, DELAYED RELEASE ORAL at 06:22

## 2021-09-17 RX ADMIN — ENOXAPARIN SODIUM 30 MG: 30 INJECTION SUBCUTANEOUS at 08:37

## 2021-09-17 RX ADMIN — CHLORHEXIDINE GLUCONATE 15 ML: 1.2 RINSE ORAL at 00:38

## 2021-09-18 ENCOUNTER — APPOINTMENT (OUTPATIENT)
Dept: GENERAL RADIOLOGY | Facility: HOSPITAL | Age: 76
End: 2021-09-18

## 2021-09-18 LAB
ALBUMIN SERPL-MCNC: 3.7 G/DL (ref 3.5–5.2)
ANION GAP SERPL CALCULATED.3IONS-SCNC: 11.7 MMOL/L (ref 5–15)
BUN SERPL-MCNC: 58 MG/DL (ref 8–23)
BUN/CREAT SERPL: 27.1 (ref 7–25)
CALCIUM SPEC-SCNC: 8.4 MG/DL (ref 8.6–10.5)
CHLORIDE SERPL-SCNC: 104 MMOL/L (ref 98–107)
CO2 SERPL-SCNC: 22.3 MMOL/L (ref 22–29)
CREAT SERPL-MCNC: 2.14 MG/DL (ref 0.76–1.27)
DEPRECATED RDW RBC AUTO: 46.5 FL (ref 37–54)
ERYTHROCYTE [DISTWIDTH] IN BLOOD BY AUTOMATED COUNT: 13.4 % (ref 12.3–15.4)
GFR SERPL CREATININE-BSD FRML MDRD: 30 ML/MIN/1.73
GLUCOSE BLDC GLUCOMTR-MCNC: 105 MG/DL (ref 70–130)
GLUCOSE BLDC GLUCOMTR-MCNC: 111 MG/DL (ref 70–130)
GLUCOSE BLDC GLUCOMTR-MCNC: 131 MG/DL (ref 70–130)
GLUCOSE BLDC GLUCOMTR-MCNC: 96 MG/DL (ref 70–130)
GLUCOSE SERPL-MCNC: 111 MG/DL (ref 65–99)
HCT VFR BLD AUTO: 29.7 % (ref 37.5–51)
HGB BLD-MCNC: 9.4 G/DL (ref 13–17.7)
MCH RBC QN AUTO: 29.8 PG (ref 26.6–33)
MCHC RBC AUTO-ENTMCNC: 31.6 G/DL (ref 31.5–35.7)
MCV RBC AUTO: 94.3 FL (ref 79–97)
PHOSPHATE SERPL-MCNC: 3.4 MG/DL (ref 2.5–4.5)
PLATELET # BLD AUTO: 163 10*3/MM3 (ref 140–450)
PMV BLD AUTO: 10.3 FL (ref 6–12)
POTASSIUM SERPL-SCNC: 4.8 MMOL/L (ref 3.5–5.2)
RBC # BLD AUTO: 3.15 10*6/MM3 (ref 4.14–5.8)
SODIUM SERPL-SCNC: 138 MMOL/L (ref 136–145)
WBC # BLD AUTO: 7.38 10*3/MM3 (ref 3.4–10.8)

## 2021-09-18 PROCEDURE — 71045 X-RAY EXAM CHEST 1 VIEW: CPT

## 2021-09-18 PROCEDURE — 85027 COMPLETE CBC AUTOMATED: CPT | Performed by: NURSE PRACTITIONER

## 2021-09-18 PROCEDURE — 99024 POSTOP FOLLOW-UP VISIT: CPT | Performed by: NURSE PRACTITIONER

## 2021-09-18 PROCEDURE — 82962 GLUCOSE BLOOD TEST: CPT

## 2021-09-18 PROCEDURE — 94799 UNLISTED PULMONARY SVC/PX: CPT

## 2021-09-18 PROCEDURE — 97110 THERAPEUTIC EXERCISES: CPT

## 2021-09-18 PROCEDURE — 99232 SBSQ HOSP IP/OBS MODERATE 35: CPT | Performed by: INTERNAL MEDICINE

## 2021-09-18 PROCEDURE — 25010000002 ENOXAPARIN PER 10 MG: Performed by: NURSE PRACTITIONER

## 2021-09-18 PROCEDURE — 80069 RENAL FUNCTION PANEL: CPT | Performed by: HOSPITALIST

## 2021-09-18 PROCEDURE — 25010000002 FUROSEMIDE PER 20 MG: Performed by: NURSE PRACTITIONER

## 2021-09-18 RX ORDER — AMLODIPINE BESYLATE 5 MG/1
5 TABLET ORAL
Status: DISCONTINUED | OUTPATIENT
Start: 2021-09-18 | End: 2021-09-19

## 2021-09-18 RX ORDER — FUROSEMIDE 10 MG/ML
40 INJECTION INTRAMUSCULAR; INTRAVENOUS ONCE
Status: COMPLETED | OUTPATIENT
Start: 2021-09-18 | End: 2021-09-18

## 2021-09-18 RX ORDER — METOPROLOL SUCCINATE 100 MG/1
100 TABLET, EXTENDED RELEASE ORAL
Status: DISCONTINUED | OUTPATIENT
Start: 2021-09-18 | End: 2021-09-22 | Stop reason: HOSPADM

## 2021-09-18 RX ADMIN — IPRATROPIUM BROMIDE AND ALBUTEROL SULFATE 3 ML: 2.5; .5 SOLUTION RESPIRATORY (INHALATION) at 19:23

## 2021-09-18 RX ADMIN — IPRATROPIUM BROMIDE AND ALBUTEROL SULFATE 3 ML: 2.5; .5 SOLUTION RESPIRATORY (INHALATION) at 08:28

## 2021-09-18 RX ADMIN — LEVOTHYROXINE SODIUM 50 MCG: 0.05 TABLET ORAL at 05:28

## 2021-09-18 RX ADMIN — PANTOPRAZOLE SODIUM 40 MG: 40 TABLET, DELAYED RELEASE ORAL at 09:11

## 2021-09-18 RX ADMIN — METOPROLOL SUCCINATE 100 MG: 100 TABLET, EXTENDED RELEASE ORAL at 14:21

## 2021-09-18 RX ADMIN — MUPIROCIN 1 APPLICATION: 20 OINTMENT TOPICAL at 09:12

## 2021-09-18 RX ADMIN — ASPIRIN 81 MG: 81 TABLET, COATED ORAL at 09:11

## 2021-09-18 RX ADMIN — GUAIFENESIN 1200 MG: 600 TABLET, EXTENDED RELEASE ORAL at 20:09

## 2021-09-18 RX ADMIN — DOCUSATE SODIUM 50MG AND SENNOSIDES 8.6MG 2 TABLET: 8.6; 5 TABLET, FILM COATED ORAL at 20:09

## 2021-09-18 RX ADMIN — MUPIROCIN 1 APPLICATION: 20 OINTMENT TOPICAL at 20:10

## 2021-09-18 RX ADMIN — FUROSEMIDE 40 MG: 10 INJECTION, SOLUTION INTRAMUSCULAR; INTRAVENOUS at 11:39

## 2021-09-18 RX ADMIN — ENOXAPARIN SODIUM 30 MG: 30 INJECTION SUBCUTANEOUS at 20:09

## 2021-09-18 RX ADMIN — ATORVASTATIN CALCIUM 40 MG: 20 TABLET, FILM COATED ORAL at 20:09

## 2021-09-18 RX ADMIN — GUAIFENESIN 1200 MG: 600 TABLET, EXTENDED RELEASE ORAL at 09:11

## 2021-09-18 RX ADMIN — AMLODIPINE BESYLATE 5 MG: 5 TABLET ORAL at 11:39

## 2021-09-19 ENCOUNTER — APPOINTMENT (OUTPATIENT)
Dept: GENERAL RADIOLOGY | Facility: HOSPITAL | Age: 76
End: 2021-09-19

## 2021-09-19 LAB
ANION GAP SERPL CALCULATED.3IONS-SCNC: 11.9 MMOL/L (ref 5–15)
BUN SERPL-MCNC: 59 MG/DL (ref 8–23)
BUN/CREAT SERPL: 30.3 (ref 7–25)
CALCIUM SPEC-SCNC: 7.8 MG/DL (ref 8.6–10.5)
CHLORIDE SERPL-SCNC: 105 MMOL/L (ref 98–107)
CO2 SERPL-SCNC: 22.1 MMOL/L (ref 22–29)
CREAT SERPL-MCNC: 1.95 MG/DL (ref 0.76–1.27)
GFR SERPL CREATININE-BSD FRML MDRD: 33 ML/MIN/1.73
GLUCOSE BLDC GLUCOMTR-MCNC: 91 MG/DL (ref 70–130)
GLUCOSE SERPL-MCNC: 96 MG/DL (ref 65–99)
POTASSIUM SERPL-SCNC: 3.9 MMOL/L (ref 3.5–5.2)
SODIUM SERPL-SCNC: 139 MMOL/L (ref 136–145)

## 2021-09-19 PROCEDURE — 25010000002 ENOXAPARIN PER 10 MG: Performed by: NURSE PRACTITIONER

## 2021-09-19 PROCEDURE — 99232 SBSQ HOSP IP/OBS MODERATE 35: CPT | Performed by: INTERNAL MEDICINE

## 2021-09-19 PROCEDURE — 80048 BASIC METABOLIC PNL TOTAL CA: CPT | Performed by: NURSE PRACTITIONER

## 2021-09-19 PROCEDURE — 94799 UNLISTED PULMONARY SVC/PX: CPT

## 2021-09-19 PROCEDURE — 94760 N-INVAS EAR/PLS OXIMETRY 1: CPT

## 2021-09-19 PROCEDURE — 25010000002 FUROSEMIDE PER 20 MG: Performed by: NURSE PRACTITIONER

## 2021-09-19 PROCEDURE — 71045 X-RAY EXAM CHEST 1 VIEW: CPT

## 2021-09-19 PROCEDURE — 82962 GLUCOSE BLOOD TEST: CPT

## 2021-09-19 PROCEDURE — 97110 THERAPEUTIC EXERCISES: CPT

## 2021-09-19 RX ORDER — AMLODIPINE BESYLATE 10 MG/1
10 TABLET ORAL
Status: DISCONTINUED | OUTPATIENT
Start: 2021-09-19 | End: 2021-09-22 | Stop reason: HOSPADM

## 2021-09-19 RX ORDER — FUROSEMIDE 10 MG/ML
40 INJECTION INTRAMUSCULAR; INTRAVENOUS ONCE
Status: COMPLETED | OUTPATIENT
Start: 2021-09-19 | End: 2021-09-19

## 2021-09-19 RX ORDER — POTASSIUM CHLORIDE 750 MG/1
20 TABLET, FILM COATED, EXTENDED RELEASE ORAL ONCE
Status: COMPLETED | OUTPATIENT
Start: 2021-09-19 | End: 2021-09-19

## 2021-09-19 RX ADMIN — METOPROLOL SUCCINATE 100 MG: 100 TABLET, EXTENDED RELEASE ORAL at 08:11

## 2021-09-19 RX ADMIN — IPRATROPIUM BROMIDE AND ALBUTEROL SULFATE 3 ML: 2.5; .5 SOLUTION RESPIRATORY (INHALATION) at 19:58

## 2021-09-19 RX ADMIN — CHLORHEXIDINE GLUCONATE 15 ML: 1.2 RINSE ORAL at 02:14

## 2021-09-19 RX ADMIN — POTASSIUM CHLORIDE 20 MEQ: 750 TABLET, EXTENDED RELEASE ORAL at 08:11

## 2021-09-19 RX ADMIN — PANTOPRAZOLE SODIUM 40 MG: 40 TABLET, DELAYED RELEASE ORAL at 06:19

## 2021-09-19 RX ADMIN — IPRATROPIUM BROMIDE AND ALBUTEROL SULFATE 3 ML: 2.5; .5 SOLUTION RESPIRATORY (INHALATION) at 07:15

## 2021-09-19 RX ADMIN — AMLODIPINE BESYLATE 10 MG: 10 TABLET ORAL at 11:14

## 2021-09-19 RX ADMIN — GUAIFENESIN 1200 MG: 600 TABLET, EXTENDED RELEASE ORAL at 20:07

## 2021-09-19 RX ADMIN — ENOXAPARIN SODIUM 30 MG: 30 INJECTION SUBCUTANEOUS at 08:12

## 2021-09-19 RX ADMIN — MUPIROCIN 1 APPLICATION: 20 OINTMENT TOPICAL at 20:08

## 2021-09-19 RX ADMIN — GUAIFENESIN 1200 MG: 600 TABLET, EXTENDED RELEASE ORAL at 08:12

## 2021-09-19 RX ADMIN — LEVOTHYROXINE SODIUM 50 MCG: 0.05 TABLET ORAL at 06:19

## 2021-09-19 RX ADMIN — DOCUSATE SODIUM 50MG AND SENNOSIDES 8.6MG 2 TABLET: 8.6; 5 TABLET, FILM COATED ORAL at 20:08

## 2021-09-19 RX ADMIN — FUROSEMIDE 40 MG: 10 INJECTION, SOLUTION INTRAMUSCULAR; INTRAVENOUS at 08:09

## 2021-09-19 RX ADMIN — ATORVASTATIN CALCIUM 40 MG: 20 TABLET, FILM COATED ORAL at 20:07

## 2021-09-19 RX ADMIN — MUPIROCIN 1 APPLICATION: 20 OINTMENT TOPICAL at 08:12

## 2021-09-19 RX ADMIN — ASPIRIN 81 MG: 81 TABLET, COATED ORAL at 08:11

## 2021-09-20 ENCOUNTER — APPOINTMENT (OUTPATIENT)
Dept: GENERAL RADIOLOGY | Facility: HOSPITAL | Age: 76
End: 2021-09-20

## 2021-09-20 LAB
ALBUMIN SERPL-MCNC: 3.5 G/DL (ref 3.5–5.2)
ANION GAP SERPL CALCULATED.3IONS-SCNC: 16 MMOL/L (ref 5–15)
BUN SERPL-MCNC: 57 MG/DL (ref 8–23)
BUN/CREAT SERPL: 28.1 (ref 7–25)
CALCIUM SPEC-SCNC: 8.1 MG/DL (ref 8.6–10.5)
CHLORIDE SERPL-SCNC: 104 MMOL/L (ref 98–107)
CO2 SERPL-SCNC: 20 MMOL/L (ref 22–29)
CREAT SERPL-MCNC: 2.03 MG/DL (ref 0.76–1.27)
DEPRECATED RDW RBC AUTO: 43.1 FL (ref 37–54)
ERYTHROCYTE [DISTWIDTH] IN BLOOD BY AUTOMATED COUNT: 13.2 % (ref 12.3–15.4)
FERRITIN SERPL-MCNC: 375 NG/ML (ref 30–400)
GFR SERPL CREATININE-BSD FRML MDRD: 32 ML/MIN/1.73
GLUCOSE SERPL-MCNC: 89 MG/DL (ref 65–99)
HCT VFR BLD AUTO: 27.5 % (ref 37.5–51)
HGB BLD-MCNC: 9.2 G/DL (ref 13–17.7)
IRON 24H UR-MRATE: 27 MCG/DL (ref 59–158)
IRON SATN MFR SERPL: 11 % (ref 20–50)
MCH RBC QN AUTO: 29.9 PG (ref 26.6–33)
MCHC RBC AUTO-ENTMCNC: 33.5 G/DL (ref 31.5–35.7)
MCV RBC AUTO: 89.3 FL (ref 79–97)
PHOSPHATE SERPL-MCNC: 3.5 MG/DL (ref 2.5–4.5)
PLATELET # BLD AUTO: 210 10*3/MM3 (ref 140–450)
PMV BLD AUTO: 10.9 FL (ref 6–12)
POTASSIUM SERPL-SCNC: 4.6 MMOL/L (ref 3.5–5.2)
RBC # BLD AUTO: 3.08 10*6/MM3 (ref 4.14–5.8)
SODIUM SERPL-SCNC: 140 MMOL/L (ref 136–145)
TIBC SERPL-MCNC: 246 MCG/DL (ref 298–536)
TRANSFERRIN SERPL-MCNC: 165 MG/DL (ref 200–360)
WBC # BLD AUTO: 7.82 10*3/MM3 (ref 3.4–10.8)

## 2021-09-20 PROCEDURE — 85027 COMPLETE CBC AUTOMATED: CPT | Performed by: NURSE PRACTITIONER

## 2021-09-20 PROCEDURE — 94799 UNLISTED PULMONARY SVC/PX: CPT

## 2021-09-20 PROCEDURE — 83540 ASSAY OF IRON: CPT | Performed by: HOSPITALIST

## 2021-09-20 PROCEDURE — 99232 SBSQ HOSP IP/OBS MODERATE 35: CPT | Performed by: INTERNAL MEDICINE

## 2021-09-20 PROCEDURE — 84466 ASSAY OF TRANSFERRIN: CPT | Performed by: HOSPITALIST

## 2021-09-20 PROCEDURE — 97530 THERAPEUTIC ACTIVITIES: CPT

## 2021-09-20 PROCEDURE — 80069 RENAL FUNCTION PANEL: CPT | Performed by: HOSPITALIST

## 2021-09-20 PROCEDURE — 71046 X-RAY EXAM CHEST 2 VIEWS: CPT

## 2021-09-20 PROCEDURE — 82728 ASSAY OF FERRITIN: CPT | Performed by: HOSPITALIST

## 2021-09-20 PROCEDURE — 25010000002 ENOXAPARIN PER 10 MG: Performed by: NURSE PRACTITIONER

## 2021-09-20 RX ADMIN — MUPIROCIN 1 APPLICATION: 20 OINTMENT TOPICAL at 20:25

## 2021-09-20 RX ADMIN — ACETAMINOPHEN 650 MG: 325 TABLET, FILM COATED ORAL at 01:15

## 2021-09-20 RX ADMIN — METOPROLOL SUCCINATE 100 MG: 100 TABLET, EXTENDED RELEASE ORAL at 10:32

## 2021-09-20 RX ADMIN — ACETAMINOPHEN 650 MG: 325 TABLET, FILM COATED ORAL at 23:42

## 2021-09-20 RX ADMIN — AMLODIPINE BESYLATE 10 MG: 10 TABLET ORAL at 10:32

## 2021-09-20 RX ADMIN — IPRATROPIUM BROMIDE AND ALBUTEROL SULFATE 3 ML: 2.5; .5 SOLUTION RESPIRATORY (INHALATION) at 10:58

## 2021-09-20 RX ADMIN — ATORVASTATIN CALCIUM 40 MG: 20 TABLET, FILM COATED ORAL at 20:25

## 2021-09-20 RX ADMIN — GUAIFENESIN 1200 MG: 600 TABLET, EXTENDED RELEASE ORAL at 20:24

## 2021-09-20 RX ADMIN — LEVOTHYROXINE SODIUM 50 MCG: 0.05 TABLET ORAL at 06:21

## 2021-09-20 RX ADMIN — PANTOPRAZOLE SODIUM 40 MG: 40 TABLET, DELAYED RELEASE ORAL at 06:21

## 2021-09-20 RX ADMIN — ASPIRIN 81 MG: 81 TABLET, COATED ORAL at 08:47

## 2021-09-20 RX ADMIN — IPRATROPIUM BROMIDE AND ALBUTEROL SULFATE 3 ML: 2.5; .5 SOLUTION RESPIRATORY (INHALATION) at 19:04

## 2021-09-20 RX ADMIN — GUAIFENESIN 1200 MG: 600 TABLET, EXTENDED RELEASE ORAL at 08:47

## 2021-09-20 RX ADMIN — ENOXAPARIN SODIUM 30 MG: 30 INJECTION SUBCUTANEOUS at 08:47

## 2021-09-20 RX ADMIN — POLYETHYLENE GLYCOL 3350 17 G: 17 POWDER, FOR SOLUTION ORAL at 10:32

## 2021-09-21 LAB
ALBUMIN SERPL-MCNC: 3.7 G/DL (ref 3.5–5.2)
ANION GAP SERPL CALCULATED.3IONS-SCNC: 10.7 MMOL/L (ref 5–15)
BUN SERPL-MCNC: 52 MG/DL (ref 8–23)
BUN/CREAT SERPL: 29.1 (ref 7–25)
CALCIUM SPEC-SCNC: 8.1 MG/DL (ref 8.6–10.5)
CHLORIDE SERPL-SCNC: 103 MMOL/L (ref 98–107)
CO2 SERPL-SCNC: 24.3 MMOL/L (ref 22–29)
CREAT SERPL-MCNC: 1.79 MG/DL (ref 0.76–1.27)
GFR SERPL CREATININE-BSD FRML MDRD: 37 ML/MIN/1.73
GLUCOSE SERPL-MCNC: 135 MG/DL (ref 65–99)
MAGNESIUM SERPL-MCNC: 2.2 MG/DL (ref 1.6–2.4)
PHOSPHATE SERPL-MCNC: 2.7 MG/DL (ref 2.5–4.5)
POTASSIUM SERPL-SCNC: 4 MMOL/L (ref 3.5–5.2)
QT INTERVAL: 397 MS
SODIUM SERPL-SCNC: 138 MMOL/L (ref 136–145)
URATE SERPL-MCNC: 8.2 MG/DL (ref 3.4–7)

## 2021-09-21 PROCEDURE — 83735 ASSAY OF MAGNESIUM: CPT | Performed by: INTERNAL MEDICINE

## 2021-09-21 PROCEDURE — 93010 ELECTROCARDIOGRAM REPORT: CPT | Performed by: INTERNAL MEDICINE

## 2021-09-21 PROCEDURE — 94799 UNLISTED PULMONARY SVC/PX: CPT

## 2021-09-21 PROCEDURE — 84550 ASSAY OF BLOOD/URIC ACID: CPT | Performed by: INTERNAL MEDICINE

## 2021-09-21 PROCEDURE — 80069 RENAL FUNCTION PANEL: CPT | Performed by: INTERNAL MEDICINE

## 2021-09-21 PROCEDURE — 93005 ELECTROCARDIOGRAM TRACING: CPT | Performed by: NURSE PRACTITIONER

## 2021-09-21 PROCEDURE — 97530 THERAPEUTIC ACTIVITIES: CPT

## 2021-09-21 PROCEDURE — 94760 N-INVAS EAR/PLS OXIMETRY 1: CPT

## 2021-09-21 PROCEDURE — 25010000002 ENOXAPARIN PER 10 MG: Performed by: NURSE PRACTITIONER

## 2021-09-21 PROCEDURE — 99232 SBSQ HOSP IP/OBS MODERATE 35: CPT | Performed by: INTERNAL MEDICINE

## 2021-09-21 RX ORDER — IRON POLYSACCHARIDE COMPLEX 150 MG
150 CAPSULE ORAL DAILY
Status: DISCONTINUED | OUTPATIENT
Start: 2021-09-21 | End: 2021-09-22 | Stop reason: HOSPADM

## 2021-09-21 RX ADMIN — AMLODIPINE BESYLATE 10 MG: 10 TABLET ORAL at 08:30

## 2021-09-21 RX ADMIN — Medication 150 MG: at 14:07

## 2021-09-21 RX ADMIN — IPRATROPIUM BROMIDE AND ALBUTEROL SULFATE 3 ML: 2.5; .5 SOLUTION RESPIRATORY (INHALATION) at 20:33

## 2021-09-21 RX ADMIN — POLYETHYLENE GLYCOL 3350 17 G: 17 POWDER, FOR SOLUTION ORAL at 10:36

## 2021-09-21 RX ADMIN — ENOXAPARIN SODIUM 30 MG: 30 INJECTION SUBCUTANEOUS at 08:30

## 2021-09-21 RX ADMIN — GUAIFENESIN 1200 MG: 600 TABLET, EXTENDED RELEASE ORAL at 08:30

## 2021-09-21 RX ADMIN — IPRATROPIUM BROMIDE AND ALBUTEROL SULFATE 3 ML: 2.5; .5 SOLUTION RESPIRATORY (INHALATION) at 08:44

## 2021-09-21 RX ADMIN — LEVOTHYROXINE SODIUM 50 MCG: 0.05 TABLET ORAL at 06:44

## 2021-09-21 RX ADMIN — ASPIRIN 81 MG: 81 TABLET, COATED ORAL at 08:30

## 2021-09-21 RX ADMIN — PANTOPRAZOLE SODIUM 40 MG: 40 TABLET, DELAYED RELEASE ORAL at 06:44

## 2021-09-21 RX ADMIN — DOCUSATE SODIUM 50MG AND SENNOSIDES 8.6MG 2 TABLET: 8.6; 5 TABLET, FILM COATED ORAL at 21:40

## 2021-09-21 RX ADMIN — ACETAMINOPHEN 650 MG: 325 TABLET, FILM COATED ORAL at 23:35

## 2021-09-21 RX ADMIN — METOPROLOL SUCCINATE 100 MG: 100 TABLET, EXTENDED RELEASE ORAL at 08:30

## 2021-09-21 RX ADMIN — ATORVASTATIN CALCIUM 40 MG: 20 TABLET, FILM COATED ORAL at 21:40

## 2021-09-21 RX ADMIN — GUAIFENESIN 1200 MG: 600 TABLET, EXTENDED RELEASE ORAL at 21:40

## 2021-09-22 ENCOUNTER — TELEPHONE (OUTPATIENT)
Dept: CARDIAC SURGERY | Facility: CLINIC | Age: 76
End: 2021-09-22

## 2021-09-22 ENCOUNTER — READMISSION MANAGEMENT (OUTPATIENT)
Dept: CALL CENTER | Facility: HOSPITAL | Age: 76
End: 2021-09-22

## 2021-09-22 ENCOUNTER — HOME HEALTH ADMISSION (OUTPATIENT)
Dept: HOME HEALTH SERVICES | Facility: HOME HEALTHCARE | Age: 76
End: 2021-09-22

## 2021-09-22 VITALS
HEIGHT: 76 IN | BODY MASS INDEX: 27.34 KG/M2 | RESPIRATION RATE: 16 BRPM | DIASTOLIC BLOOD PRESSURE: 66 MMHG | SYSTOLIC BLOOD PRESSURE: 133 MMHG | HEART RATE: 64 BPM | WEIGHT: 224.5 LBS | OXYGEN SATURATION: 94 % | TEMPERATURE: 98.5 F

## 2021-09-22 LAB
ALBUMIN SERPL-MCNC: 3.3 G/DL (ref 3.5–5.2)
ANION GAP SERPL CALCULATED.3IONS-SCNC: 9.9 MMOL/L (ref 5–15)
BUN SERPL-MCNC: 44 MG/DL (ref 8–23)
BUN/CREAT SERPL: 28.9 (ref 7–25)
CALCIUM SPEC-SCNC: 7.8 MG/DL (ref 8.6–10.5)
CHLORIDE SERPL-SCNC: 102 MMOL/L (ref 98–107)
CO2 SERPL-SCNC: 23.1 MMOL/L (ref 22–29)
CREAT SERPL-MCNC: 1.52 MG/DL (ref 0.76–1.27)
DEPRECATED RDW RBC AUTO: 42 FL (ref 37–54)
ERYTHROCYTE [DISTWIDTH] IN BLOOD BY AUTOMATED COUNT: 12.9 % (ref 12.3–15.4)
GFR SERPL CREATININE-BSD FRML MDRD: 45 ML/MIN/1.73
GLUCOSE SERPL-MCNC: 98 MG/DL (ref 65–99)
HCT VFR BLD AUTO: 24.9 % (ref 37.5–51)
HGB BLD-MCNC: 8 G/DL (ref 13–17.7)
MAGNESIUM SERPL-MCNC: 2.1 MG/DL (ref 1.6–2.4)
MCH RBC QN AUTO: 28.7 PG (ref 26.6–33)
MCHC RBC AUTO-ENTMCNC: 32.1 G/DL (ref 31.5–35.7)
MCV RBC AUTO: 89.2 FL (ref 79–97)
PHOSPHATE SERPL-MCNC: 3.1 MG/DL (ref 2.5–4.5)
PLATELET # BLD AUTO: 316 10*3/MM3 (ref 140–450)
PMV BLD AUTO: 9.6 FL (ref 6–12)
POTASSIUM SERPL-SCNC: 4.2 MMOL/L (ref 3.5–5.2)
RBC # BLD AUTO: 2.79 10*6/MM3 (ref 4.14–5.8)
SODIUM SERPL-SCNC: 135 MMOL/L (ref 136–145)
WBC # BLD AUTO: 6.98 10*3/MM3 (ref 3.4–10.8)

## 2021-09-22 PROCEDURE — 97530 THERAPEUTIC ACTIVITIES: CPT

## 2021-09-22 PROCEDURE — 83735 ASSAY OF MAGNESIUM: CPT | Performed by: INTERNAL MEDICINE

## 2021-09-22 PROCEDURE — 99232 SBSQ HOSP IP/OBS MODERATE 35: CPT | Performed by: NURSE PRACTITIONER

## 2021-09-22 PROCEDURE — 80069 RENAL FUNCTION PANEL: CPT | Performed by: INTERNAL MEDICINE

## 2021-09-22 PROCEDURE — 94799 UNLISTED PULMONARY SVC/PX: CPT

## 2021-09-22 PROCEDURE — 85027 COMPLETE CBC AUTOMATED: CPT | Performed by: NURSE PRACTITIONER

## 2021-09-22 PROCEDURE — 94760 N-INVAS EAR/PLS OXIMETRY 1: CPT

## 2021-09-22 PROCEDURE — 25010000002 ENOXAPARIN PER 10 MG: Performed by: NURSE PRACTITIONER

## 2021-09-22 RX ORDER — ACETAMINOPHEN 325 MG/1
650 TABLET ORAL EVERY 4 HOURS PRN
Start: 2021-09-22

## 2021-09-22 RX ORDER — IRON POLYSACCHARIDE COMPLEX 150 MG
150 CAPSULE ORAL DAILY
Qty: 30 CAPSULE | Refills: 2 | Status: SHIPPED | OUTPATIENT
Start: 2021-09-23 | End: 2021-10-19

## 2021-09-22 RX ORDER — AMLODIPINE BESYLATE 10 MG/1
10 TABLET ORAL
Qty: 30 TABLET | Refills: 2 | Status: SHIPPED | OUTPATIENT
Start: 2021-09-23 | End: 2021-10-27

## 2021-09-22 RX ORDER — METOPROLOL SUCCINATE 100 MG/1
100 TABLET, EXTENDED RELEASE ORAL
Qty: 30 TABLET | Refills: 2 | Status: SHIPPED | OUTPATIENT
Start: 2021-09-23 | End: 2021-10-27

## 2021-09-22 RX ADMIN — IPRATROPIUM BROMIDE AND ALBUTEROL SULFATE 3 ML: 2.5; .5 SOLUTION RESPIRATORY (INHALATION) at 07:11

## 2021-09-22 RX ADMIN — PANTOPRAZOLE SODIUM 40 MG: 40 TABLET, DELAYED RELEASE ORAL at 06:24

## 2021-09-22 RX ADMIN — LEVOTHYROXINE SODIUM 50 MCG: 0.05 TABLET ORAL at 06:24

## 2021-09-22 RX ADMIN — ENOXAPARIN SODIUM 30 MG: 30 INJECTION SUBCUTANEOUS at 08:51

## 2021-09-22 RX ADMIN — METOPROLOL SUCCINATE 100 MG: 100 TABLET, EXTENDED RELEASE ORAL at 08:51

## 2021-09-22 RX ADMIN — AMLODIPINE BESYLATE 10 MG: 10 TABLET ORAL at 08:51

## 2021-09-22 RX ADMIN — Medication 150 MG: at 08:51

## 2021-09-22 RX ADMIN — GUAIFENESIN 1200 MG: 600 TABLET, EXTENDED RELEASE ORAL at 08:50

## 2021-09-22 RX ADMIN — ASPIRIN 81 MG: 81 TABLET, COATED ORAL at 08:54

## 2021-09-22 NOTE — TELEPHONE ENCOUNTER
Patients daughter calling for clarification if Mr. Maynard is to resume Livalo or if he is being started on a statin.    Discussed with Sabrina Corral DNP, Per Sabrina Mr. Maynard is to resume Livalo.    Discussed Sabrina's recommendations with Umm, advised for  to resume Livalo. Advised should they have any questions or concerns to contact our office. She verbalized understanding and this was agreeable.

## 2021-09-23 ENCOUNTER — HOME CARE VISIT (OUTPATIENT)
Dept: HOME HEALTH SERVICES | Facility: HOME HEALTHCARE | Age: 76
End: 2021-09-23

## 2021-09-23 VITALS
HEART RATE: 78 BPM | RESPIRATION RATE: 16 BRPM | DIASTOLIC BLOOD PRESSURE: 80 MMHG | TEMPERATURE: 97.2 F | OXYGEN SATURATION: 90 % | SYSTOLIC BLOOD PRESSURE: 138 MMHG

## 2021-09-23 LAB
BH BB BLOOD EXPIRATION DATE: NORMAL
BH BB BLOOD EXPIRATION DATE: NORMAL
BH BB BLOOD TYPE BARCODE: 5100
BH BB BLOOD TYPE BARCODE: 5100
BH BB DISPENSE STATUS: NORMAL
BH BB DISPENSE STATUS: NORMAL
BH BB PRODUCT CODE: NORMAL
BH BB PRODUCT CODE: NORMAL
BH BB UNIT NUMBER: NORMAL
BH BB UNIT NUMBER: NORMAL
CROSSMATCH INTERPRETATION: NORMAL
CROSSMATCH INTERPRETATION: NORMAL
UNIT  ABO: NORMAL
UNIT  ABO: NORMAL
UNIT  RH: NORMAL
UNIT  RH: NORMAL

## 2021-09-23 PROCEDURE — G0299 HHS/HOSPICE OF RN EA 15 MIN: HCPCS

## 2021-09-27 ENCOUNTER — HOME CARE VISIT (OUTPATIENT)
Dept: HOME HEALTH SERVICES | Facility: HOME HEALTHCARE | Age: 76
End: 2021-09-27

## 2021-09-27 VITALS
OXYGEN SATURATION: 97 % | DIASTOLIC BLOOD PRESSURE: 80 MMHG | RESPIRATION RATE: 18 BRPM | TEMPERATURE: 98.1 F | HEART RATE: 78 BPM | SYSTOLIC BLOOD PRESSURE: 148 MMHG

## 2021-09-27 PROCEDURE — G0495 RN CARE TRAIN/EDU IN HH: HCPCS

## 2021-09-29 ENCOUNTER — READMISSION MANAGEMENT (OUTPATIENT)
Dept: CALL CENTER | Facility: HOSPITAL | Age: 76
End: 2021-09-29

## 2021-09-29 NOTE — OUTREACH NOTE
CT Surgery Week 2 Survey      Responses   [facility] patient discharged from?  [place]   Does the patient have one of the following disease processes/diagnoses(primary or secondary)?  Cardiothoracic surgery   Week 2 attempt successful?  No   Unsuccessful attempts  Attempt 1          Karen Estevez RN

## 2021-09-30 ENCOUNTER — HOME CARE VISIT (OUTPATIENT)
Dept: HOME HEALTH SERVICES | Facility: HOME HEALTHCARE | Age: 76
End: 2021-09-30

## 2021-09-30 VITALS
TEMPERATURE: 97.4 F | DIASTOLIC BLOOD PRESSURE: 60 MMHG | RESPIRATION RATE: 18 BRPM | OXYGEN SATURATION: 98 % | SYSTOLIC BLOOD PRESSURE: 140 MMHG | HEART RATE: 85 BPM

## 2021-09-30 PROCEDURE — G0151 HHCP-SERV OF PT,EA 15 MIN: HCPCS

## 2021-09-30 NOTE — HOME HEALTH
Pt was hospitalized from 9/14-9/22 secondary to severe multi-vessel CAD-s/p CABGx5 LIMA/RSVG, stable angina, White cryomaze, resection and closure LINUS, radical MV repair, ischemic cardiomyopathy--EF 40-45%, moderate mitral valve regurgitation with prolapse of posterior leaflet, persistent atrial fibrillation, carotid stenosis s/p bilateral endarterectomies, GERD, CKD stage 3, HLD, HTN, recent fall, post-op anemia.     Pt lives alone. His son lives next door. Daughter is in from out of town.   Pt was independent, driving, amb without device, exercising 3x/week with , and was walking on a treadmill regularly.   Pt presented with B TEDS in place and R LE edema is greater than L LE. Notified Rehana Seymour RN.  She recommends he elevate today above heart level, monitoring for any other symptoms, and call her if needed. She plans to see him tomorrow. He and his daughter verbalized understanding.     Goal to improve stamina, increase ease of stair climbing, and improve balance as needed to exit the home and return to community.    Plan for next visit: review PLB with exertion, increase walking duration, stair training, review standing HEP all while monitoring vitals.

## 2021-10-01 ENCOUNTER — READMISSION MANAGEMENT (OUTPATIENT)
Dept: CALL CENTER | Facility: HOSPITAL | Age: 76
End: 2021-10-01

## 2021-10-01 ENCOUNTER — HOME CARE VISIT (OUTPATIENT)
Dept: HOME HEALTH SERVICES | Facility: HOME HEALTHCARE | Age: 76
End: 2021-10-01

## 2021-10-01 VITALS
HEART RATE: 73 BPM | SYSTOLIC BLOOD PRESSURE: 152 MMHG | OXYGEN SATURATION: 97 % | TEMPERATURE: 97.3 F | DIASTOLIC BLOOD PRESSURE: 72 MMHG

## 2021-10-01 PROCEDURE — G0495 RN CARE TRAIN/EDU IN HH: HCPCS

## 2021-10-01 PROCEDURE — G0180 MD CERTIFICATION HHA PATIENT: HCPCS | Performed by: THORACIC SURGERY (CARDIOTHORACIC VASCULAR SURGERY)

## 2021-10-01 NOTE — OUTREACH NOTE
CT Surgery Week 2 Survey      Responses   [facility] patient discharged from?  [place]   Does the patient have one of the following disease processes/diagnoses(primary or secondary)?  Cardiothoracic surgery   Week 2 attempt successful?  No   Unsuccessful attempts  Attempt 2          Bessie Arzate RN

## 2021-10-05 ENCOUNTER — HOME CARE VISIT (OUTPATIENT)
Dept: HOME HEALTH SERVICES | Facility: HOME HEALTHCARE | Age: 76
End: 2021-10-05

## 2021-10-05 VITALS — HEART RATE: 79 BPM | OXYGEN SATURATION: 98 % | TEMPERATURE: 97.3 F | RESPIRATION RATE: 18 BRPM

## 2021-10-05 PROCEDURE — G0151 HHCP-SERV OF PT,EA 15 MIN: HCPCS

## 2021-10-05 NOTE — HOME HEALTH
Pt presented home with his daughter.  He reports he feels things are better overall, except for his stamina. He reports he also noticed his legs were swelling more with the TEDS and wanted to show therapist his legs so therefore he did not wear today. He has a moderate amount of edema in R LE and discoloration. Picture uploaded to media, notified his MD's, and SN  of the appearance of R LE and picture. Dr Yu replied and said make sure he is wearing his TEDS, and looks ok.  Therefore, informed pt and he plans to discuss further with Dr Kelsey tomorrow. Recommend he don the TEDS first thing in the morning and remove at HS. He verbalized understanding.    Pt's walking distance continues to be very limited due to SOA. SaO2 is WNL but RR seems to increase.  He also continues to breathe primarily through his mouth with exertion. His standing balance has improved grossly but recommend he use an AD for appointments tomorrow. Reviewed true elevation above heart level using his bed mechanism. He does not get sufficient elevation in his recliner.     Plan for next visit: standing tolerance, gait duration, gait with AAD, PLB with exertion.

## 2021-10-06 ENCOUNTER — OFFICE VISIT (OUTPATIENT)
Dept: CARDIOLOGY | Facility: CLINIC | Age: 76
End: 2021-10-06

## 2021-10-06 ENCOUNTER — HOSPITAL ENCOUNTER (OUTPATIENT)
Dept: CARDIOLOGY | Facility: HOSPITAL | Age: 76
Discharge: HOME | End: 2021-10-06

## 2021-10-06 ENCOUNTER — LAB (OUTPATIENT)
Dept: LAB | Facility: HOSPITAL | Age: 76
End: 2021-10-06

## 2021-10-06 VITALS
OXYGEN SATURATION: 99 % | HEIGHT: 75 IN | BODY MASS INDEX: 29.09 KG/M2 | WEIGHT: 234 LBS | SYSTOLIC BLOOD PRESSURE: 152 MMHG | DIASTOLIC BLOOD PRESSURE: 78 MMHG | HEART RATE: 79 BPM

## 2021-10-06 DIAGNOSIS — N18.30 CHRONIC RENAL INSUFFICIENCY, STAGE 3 (MODERATE) (HCC): ICD-10-CM

## 2021-10-06 DIAGNOSIS — M79.89 LEG SWELLING: ICD-10-CM

## 2021-10-06 DIAGNOSIS — Z95.1 S/P CABG (CORONARY ARTERY BYPASS GRAFT): ICD-10-CM

## 2021-10-06 DIAGNOSIS — I65.22 CAROTID STENOSIS, ASYMPTOMATIC, LEFT: Primary | ICD-10-CM

## 2021-10-06 DIAGNOSIS — K59.09 OTHER CONSTIPATION: ICD-10-CM

## 2021-10-06 DIAGNOSIS — E78.49 OTHER HYPERLIPIDEMIA: ICD-10-CM

## 2021-10-06 DIAGNOSIS — I25.118 CORONARY ARTERY DISEASE OF NATIVE ARTERY OF NATIVE HEART WITH STABLE ANGINA PECTORIS (HCC): ICD-10-CM

## 2021-10-06 DIAGNOSIS — I48.19 PERSISTENT ATRIAL FIBRILLATION (HCC): ICD-10-CM

## 2021-10-06 DIAGNOSIS — I34.0 NON-RHEUMATIC MITRAL REGURGITATION: ICD-10-CM

## 2021-10-06 DIAGNOSIS — I42.0 DILATED CARDIOMYOPATHY (HCC): ICD-10-CM

## 2021-10-06 DIAGNOSIS — Z98.890 S/P BILATERAL CAROTID ENDARTERECTOMY: ICD-10-CM

## 2021-10-06 LAB
ANION GAP SERPL CALCULATED.3IONS-SCNC: 12.2 MMOL/L (ref 5–15)
BH CV LOW VAS RIGHT VARICOSITY AK VESSEL: 1
BH CV LOWER VASCULAR LEFT COMMON FEMORAL AUGMENT: NORMAL
BH CV LOWER VASCULAR LEFT COMMON FEMORAL COMPETENT: NORMAL
BH CV LOWER VASCULAR LEFT COMMON FEMORAL COMPRESS: NORMAL
BH CV LOWER VASCULAR LEFT COMMON FEMORAL PHASIC: NORMAL
BH CV LOWER VASCULAR LEFT COMMON FEMORAL SPONT: NORMAL
BH CV LOWER VASCULAR RIGHT COMMON FEMORAL AUGMENT: NORMAL
BH CV LOWER VASCULAR RIGHT COMMON FEMORAL COMPETENT: NORMAL
BH CV LOWER VASCULAR RIGHT COMMON FEMORAL COMPRESS: NORMAL
BH CV LOWER VASCULAR RIGHT COMMON FEMORAL PHASIC: NORMAL
BH CV LOWER VASCULAR RIGHT COMMON FEMORAL SPONT: NORMAL
BH CV LOWER VASCULAR RIGHT DISTAL FEMORAL COMPRESS: NORMAL
BH CV LOWER VASCULAR RIGHT GASTRONEMIUS COMPRESS: NORMAL
BH CV LOWER VASCULAR RIGHT GREATER SAPH AK COMPRESS: NORMAL
BH CV LOWER VASCULAR RIGHT GREATER SAPH BK COMPRESS: NORMAL
BH CV LOWER VASCULAR RIGHT LESSER SAPH COMPRESS: NORMAL
BH CV LOWER VASCULAR RIGHT MID FEMORAL AUGMENT: NORMAL
BH CV LOWER VASCULAR RIGHT MID FEMORAL COMPETENT: NORMAL
BH CV LOWER VASCULAR RIGHT MID FEMORAL COMPRESS: NORMAL
BH CV LOWER VASCULAR RIGHT MID FEMORAL PHASIC: NORMAL
BH CV LOWER VASCULAR RIGHT MID FEMORAL SPONT: NORMAL
BH CV LOWER VASCULAR RIGHT PERONEAL COMPRESS: NORMAL
BH CV LOWER VASCULAR RIGHT POPLITEAL AUGMENT: NORMAL
BH CV LOWER VASCULAR RIGHT POPLITEAL COMPETENT: NORMAL
BH CV LOWER VASCULAR RIGHT POPLITEAL COMPRESS: NORMAL
BH CV LOWER VASCULAR RIGHT POPLITEAL PHASIC: NORMAL
BH CV LOWER VASCULAR RIGHT POPLITEAL SPONT: NORMAL
BH CV LOWER VASCULAR RIGHT POSTERIOR TIBIAL COMPRESS: NORMAL
BH CV LOWER VASCULAR RIGHT PROFUNDA FEMORAL COMPRESS: NORMAL
BH CV LOWER VASCULAR RIGHT PROXIMAL FEMORAL COMPRESS: NORMAL
BH CV LOWER VASCULAR RIGHT SAPHENOFEMORAL JUNCTION COMPRESS: NORMAL
BH CV LOWER VASCULAR RIGHT VARICOSITY AK COMPRESS: NORMAL
BH CV LOWER VASCULAR RIGHT VARICOSITY AK THROMBUS: NORMAL
BUN SERPL-MCNC: 25 MG/DL (ref 8–23)
BUN/CREAT SERPL: 16.6 (ref 7–25)
CALCIUM SPEC-SCNC: 9.5 MG/DL (ref 8.6–10.5)
CHLORIDE SERPL-SCNC: 105 MMOL/L (ref 98–107)
CO2 SERPL-SCNC: 23.8 MMOL/L (ref 22–29)
CREAT SERPL-MCNC: 1.51 MG/DL (ref 0.76–1.27)
GFR SERPL CREATININE-BSD FRML MDRD: 45 ML/MIN/1.73
GLUCOSE SERPL-MCNC: 115 MG/DL (ref 65–99)
NT-PROBNP SERPL-MCNC: 3529 PG/ML (ref 0–1800)
POTASSIUM SERPL-SCNC: 4.8 MMOL/L (ref 3.5–5.2)
SODIUM SERPL-SCNC: 141 MMOL/L (ref 136–145)

## 2021-10-06 PROCEDURE — 36415 COLL VENOUS BLD VENIPUNCTURE: CPT

## 2021-10-06 PROCEDURE — 93971 EXTREMITY STUDY: CPT

## 2021-10-06 PROCEDURE — 80048 BASIC METABOLIC PNL TOTAL CA: CPT

## 2021-10-06 PROCEDURE — 93000 ELECTROCARDIOGRAM COMPLETE: CPT | Performed by: NURSE PRACTITIONER

## 2021-10-06 PROCEDURE — 99215 OFFICE O/P EST HI 40 MIN: CPT | Performed by: NURSE PRACTITIONER

## 2021-10-06 PROCEDURE — 83880 ASSAY OF NATRIURETIC PEPTIDE: CPT

## 2021-10-06 RX ORDER — FUROSEMIDE 40 MG/1
40 TABLET ORAL DAILY
Qty: 30 TABLET | Refills: 11 | Status: SHIPPED | OUTPATIENT
Start: 2021-10-06 | End: 2022-11-18 | Stop reason: ALTCHOICE

## 2021-10-06 RX ORDER — DOCUSATE SODIUM 50 MG/5ML
50 LIQUID ORAL 2 TIMES DAILY
Status: SHIPPED | OUTPATIENT
Start: 2021-10-06

## 2021-10-06 RX ORDER — PITAVASTATIN CALCIUM 4.18 MG/1
4 TABLET, FILM COATED ORAL DAILY
COMMUNITY
Start: 2021-06-28 | End: 2021-12-16

## 2021-10-06 NOTE — PROGRESS NOTES
Date of Office Visit: 10/06/2021  Encounter Provider: CAROL Arevalo  Place of Service: [facility] CARDIOLOGY  Patient Name: Lucas Maynard  :1945    Chief Complaint   Patient presents with   • Hospital Follow Up Visit   • Coronary Artery Disease   • mitral regurgitation   :     HPI: Lucas Maynard is a 76-year-old male who is a patient of Dr. Kelsey and is known to me from hospitalization.  He has a history of persistent A. fib, cardiomyopathy, hypertension and prostate cancer.  He had been anticoagulated in the past with Xarelto but it was stopped in .  He started complaining of some symptoms of unstable angina back in August and underwent a cardiac cath.  He was found to have severe three-vessel coronary disease with a EF of 45 to 50%.  He is referred for coronary artery bypass surgery with Dr. Yu.  On  he was admitted for 5 vessel CABG with a LIMA to the LAD, saphenous vein graft to PDA, saphenous vein graft to OM, sequential saphenous vein graft to first and second diagonal, mitral valve repair and a cryomaze with resection and closure of the left atrial appendage.  Postoperatively he required some diuretics his rhythm was stable and had a pretty uneventful course.  He presents today for hospital follow-up.    Since he has been out of the hospital he has noticed that he is still having some trouble with dyspnea with exertion.  He is also had some worsening swelling in his legs especially his right leg and it is warm and hot on the right calf.  He noticed a dry cough as well.  He is also having issues with constipation and is taking MiraLAX every day but this is not working.  He is not having any heart racing or palpitations but his blood pressure is running about 140.  When he was in the hospital he had to see nephrology for acute kidney injury and his creatinine was 1.5 on discharge.    Previous testing and notes have been reviewed by me.    Past Medical History:   Diagnosis Date   • Atrial fibrillation (HCC)    • CAD (coronary artery disease)    • Cancer (HCC) 2007    prostate biopsy only no radiation   • Cardiomyopathy (HCC)    • Carotid stenosis    • DDD (degenerative disc disease), lumbar    • Disease of thyroid gland    • GERD (gastroesophageal reflux disease)    • Hyperlipidemia    • Mitral valve stenosis    • Murmur, heart    • SOB (shortness of breath)        Past Surgical History:   Procedure Laterality Date   • CARDIAC CATHETERIZATION N/A 8/30/2021    Procedure: Coronary angiography;  Surgeon: Jean Pierre Kelsey MD;  Location: [facility] CATH INVASIVE LOCATION;  Service: Cardiovascular;  Laterality: N/A;   • CARDIAC CATHETERIZATION N/A 8/30/2021    Procedure: Left Heart Cath;  Surgeon: Jean Pierre Kelsey MD;  Location: [facility] CATH INVASIVE LOCATION;  Service: Cardiovascular;  Laterality: N/A;   • CARDIAC CATHETERIZATION N/A 8/30/2021    Procedure: Left ventriculography;  Surgeon: Jean Pierre Kelsey MD;  Location: [facility] CATH INVASIVE LOCATION;  Service: Cardiovascular;  Laterality: N/A;   • CAROTID ENDARTERECTOMY Right 12/20/2018    Procedure: RIGHT CAROTID ENDARTERECTOMY;  Surgeon: Javier Acevedo MD;  Location: [facility] MAIN OR;  Service: Vascular   • CAROTID ENDARTERECTOMY Left 7/3/2019    Procedure: left CAROTID ENDARTERECTOMY;  Surgeon: Javier Acevedo MD;  Location: [facility] MAIN OR;  Service: Vascular   • CATARACT EXTRACTION, BILATERAL  2020    WITH LENS    • COLONOSCOPY N/A 7/27/2016    Procedure: COLONOSCOPY to terminal ileum with polypectomy;  Surgeon: Hi Quintanilla MD;  Location: [facility] ENDOSCOPY;  Service:    • CORONARY ARTERY BYPASS GRAFT WITH MITRAL VALVE REPAIR/REPLACEMENT N/A 9/14/2021    Procedure: INTRAOP GIRMA, CORONARY ARTERY BYPASS GRAFTING X 5 UTILIZING LEFT SANTOS AND ENDOSCOPICALLY HARVESTED RIGHT SAPHENOUS VEIN, MITRAL VALVE REPAIR/REPLACEMENT, MAZE PROCEDURE,  ATRIAL APPENDAGE CLOSURE, AND PRP;  Surgeon: Jr Flaco Yu  MD SOPHIA;  Location: [facility];  Service: Cardiothoracic;  Laterality: N/A;   • CYST REMOVAL Right     ANKLE   • ENDOSCOPY     • PROSTATE BIOPSY      dx 2007 cancer no surgery or radiation, MULTIPLE BIOPSIES   • TONSILLECTOMY         Social History     Socioeconomic History   • Marital status: Single     Spouse name: Not on file   • Number of children: Not on file   • Years of education: Not on file   • Highest education level: Not on file   Tobacco Use   • Smoking status: Never Smoker   • Smokeless tobacco: Never Used   Substance and Sexual Activity   • Alcohol use: Not Currently     Comment: 3-4 DRINKS/WEEK   • Drug use: No   • Sexual activity: Defer       Family History   Problem Relation Age of Onset   • Malig Hyperthermia Neg Hx        Review of Systems   Constitutional: Negative for diaphoresis and malaise/fatigue.   Cardiovascular: Positive for dyspnea on exertion and leg swelling. Negative for chest pain, claudication, irregular heartbeat, near-syncope, orthopnea, palpitations, paroxysmal nocturnal dyspnea and syncope.   Respiratory: Negative for cough, shortness of breath and sleep disturbances due to breathing.    Musculoskeletal: Positive for muscle weakness. Negative for falls.        Sternal pain   Neurological: Negative for dizziness and weakness.   Psychiatric/Behavioral: Negative for altered mental status and substance abuse.       Allergies   Allergen Reactions   • Avelox [Moxifloxacin Hcl] Other (See Comments)     Joint discomfort   • Linzess [Linaclotide] Diarrhea   • Rosuvastatin Unknown - Low Severity   • Statins Myalgia   • Xarelto [Rivaroxaban] Myalgia   • Eliquis [Apixaban] Rash         Current Outpatient Medications:   •  acetaminophen (TYLENOL) 325 MG tablet, Take 2 tablets by mouth Every 4 (Four) Hours As Needed for Mild Pain ., Disp: , Rfl:   •  amLODIPine (NORVASC) 10 MG tablet, Take 1 tablet by mouth Daily for 90 days., Disp: 30 tablet, Rfl: 2  •  aspirin 81 MG EC tablet, Take 81 mg by  "mouth Daily. INSTRUCTED PT TO FOLLOW MD INSTRUCTIONS REGARDING SURGERY, Disp: , Rfl:   •  ipratropium (ATROVENT) 0.03 % nasal spray, 2 sprays into the nostril(s) as directed by provider Every 12 (Twelve) Hours., Disp: , Rfl:   •  iron polysaccharides (NIFEREX) 150 MG capsule, Take 1 capsule by mouth Daily for 90 days., Disp: 30 capsule, Rfl: 2  •  levothyroxine (SYNTHROID, LEVOTHROID) 50 MCG tablet, Take 50 mcg by mouth Daily., Disp: , Rfl:   •  LYCOPENE PO, Take 80 mg by mouth Daily. HOLD PRIOR TO SURG, Disp: , Rfl:   •  metoprolol succinate XL (TOPROL-XL) 100 MG 24 hr tablet, Take 1 tablet by mouth Daily for 90 days., Disp: 30 tablet, Rfl: 2  •  Multiple Vitamin (MULTI-VITAMIN DAILY PO), Take 1 tablet by mouth Daily. HOLD PRIOR TO SURG, Disp: , Rfl:   •  pantoprazole (PROTONIX) 40 MG EC tablet, Take 40 mg by mouth Every Night., Disp: , Rfl:   •  Pitavastatin Calcium (Livalo) 4 MG tablet, Take 4 mg by mouth Daily., Disp: , Rfl:   •  Probiotic Product (PROBIOTIC ADVANCED PO), Take 2 capsules by mouth Daily., Disp: , Rfl:   •  vitamin B-12 (CYANOCOBALAMIN) 1000 MCG tablet, Take 2,000 mcg by mouth Daily. HOLD PRIOR TO SURG, Disp: , Rfl:   •  Zinc 50 MG capsule, Take  by mouth. HOLD PRIOR TO SURG, Disp: , Rfl:   No current facility-administered medications for this visit.    Facility-Administered Medications Ordered in Other Visits:   •  Chlorhexidine Gluconate Cloth 2 % pads 1 application, 1 application, Topical, Q12H PRN, Sabrina Corral, APRN      Objective:     Vitals:    10/06/21 1453 10/06/21 1513 10/06/21 1514   BP: 158/80 146/76 152/78   Patient Position: Sitting Lying Standing   Pulse: 79 95 79   SpO2: 98% 95% 99%   Weight: 106 kg (234 lb)     Height: 190.5 cm (75\")       Body mass index is 29.25 kg/m².    PHYSICAL EXAM:    Constitutional:       General: Not in acute distress.     Appearance: Normal appearance. Well-developed.   Eyes:      Pupils: Pupils are equal, round, and reactive to light.   HENT: "      Head: Normocephalic.   Neck:      Vascular: No carotid bruit or JVD.   Pulmonary:      Effort: Pulmonary effort is normal. No tachypnea.      Breath sounds: Examination of the right-lower field reveals decreased breath sounds. Examination of the left-lower field reveals decreased breath sounds and rales. Decreased breath sounds present. No wheezing. Rales present.   Cardiovascular:      Normal rate. Irregularly irregular rhythm.      No gallop.      Comments: Hematoma to right upper thigh  Pulses:     Intact distal pulses.   Edema:     Thigh: 2+ edema of the right thigh.     Pretibial: 1+ edema of the left pretibial area and 2+ edema of the right pretibial area.     Ankle: 1+ edema of the left ankle and 2+ edema of the right ankle.     Feet: edema of the left foot and 1+ edema of the right foot.  Abdominal:      General: Bowel sounds are normal.      Palpations: Abdomen is soft.      Tenderness: There is no abdominal tenderness.   Musculoskeletal: Normal range of motion.      Cervical back: Normal range of motion and neck supple. No edema. Skin:     General: Skin is warm and dry.        Neurological:      Mental Status: Alert and oriented to person, place, and time.           ECG 12 Lead    Date/Time: 10/6/2021 3:56 PM  Performed by: Jovita Wise APRN  Authorized by: Jovita Wise APRN   Comparison: compared with previous ECG from 8/26/2021  Similar to previous ECG  Rhythm: atrial fibrillation  Rate: normal  QRS axis: normal  Other findings: non-specific ST-T wave changes    Clinical impression: abnormal EKG              Assessment:       Diagnosis Plan   1. Carotid stenosis, asymptomatic, left     2. Coronary artery disease of native artery of native heart with stable angina pectoris (HCC)     3. Dilated cardiomyopathy (HCC)     4. Other hyperlipidemia     5. Non-rheumatic mitral regurgitation     6. S/p bilateral carotid endarterectomy     7. Syncope and collapse     8. S/P CABG (coronary artery  bypass graft)     9. Persistent atrial fibrillation (HCC)       No orders of the defined types were placed in this encounter.         Plan:       In terms of his constipation I am getting put him on Colace 50 mg twice a day in addition to his MiraLAX.  It could be his Niferex that is causing constipation in addition to pain medicine I want him to follow-up with his family doctor about this.  I think he could have a DVT in his right leg where he had some vein graft Endo harvesting.  Some get a send him for stat venous duplex of the right lower extremity.  I am also getting get some labs check his kidney function check a BNP for CHF.  And start him on 40 of Lasix a day.  I want home health to draw some labs on Monday and I am getting get some labs today and he is going to follow-up with nephrology on Friday.  I will call him with test results.    Time Spent: I spent 45 minutes caring for Lucas on this date of service. This time includes time spent by me in the following activities: preparing for the visit, reviewing tests, obtaining and/or reviewing a separately obtained history, performing a medically appropriate examination and/or evaluation, counseling and educating the patient/family/caregiver, ordering medications, tests, or procedures, referring and communicating with other health care professionals, documenting information in the medical record, independently interpreting results and communicating that information with the patient/family/caregiver and care coordination.   I spent 3 minutes on the separately reported service of ECG. This time is not included in the time used to support the E/M service also reported today.         Your medication list          Accurate as of October 6, 2021  3:24 PM. If you have any questions, ask your nurse or doctor.            CHANGE how you take these medications      Instructions Last Dose Given Next Dose Due   Livalo 4 MG tablet  Generic drug: Pitavastatin Calcium  What  changed: Another medication with the same name was removed. Continue taking this medication, and follow the directions you see here.  Changed by: CAROL Arevalo      Take 4 mg by mouth Daily.          CONTINUE taking these medications      Instructions Last Dose Given Next Dose Due   acetaminophen 325 MG tablet  Commonly known as: TYLENOL      Take 2 tablets by mouth Every 4 (Four) Hours As Needed for Mild Pain .       amLODIPine 10 MG tablet  Commonly known as: NORVASC      Take 1 tablet by mouth Daily for 90 days.       aspirin 81 MG EC tablet      Take 81 mg by mouth Daily. INSTRUCTED PT TO FOLLOW MD INSTRUCTIONS REGARDING SURGERY       ipratropium 0.03 % nasal spray  Commonly known as: ATROVENT      2 sprays into the nostril(s) as directed by provider Every 12 (Twelve) Hours.       iron polysaccharides 150 MG capsule  Commonly known as: NIFEREX      Take 1 capsule by mouth Daily for 90 days.       levothyroxine 50 MCG tablet  Commonly known as: SYNTHROID, LEVOTHROID      Take 50 mcg by mouth Daily.       LYCOPENE PO      Take 80 mg by mouth Daily. HOLD PRIOR TO SURG       metoprolol succinate  MG 24 hr tablet  Commonly known as: TOPROL-XL      Take 1 tablet by mouth Daily for 90 days.       multivitamin tablet tablet  Commonly known as: THERAGRAN      Take 1 tablet by mouth Daily. HOLD PRIOR TO SURG       pantoprazole 40 MG EC tablet  Commonly known as: PROTONIX      Take 40 mg by mouth Every Night.       PROBIOTIC ADVANCED PO      Take 2 capsules by mouth Daily.       vitamin B-12 1000 MCG tablet  Commonly known as: CYANOCOBALAMIN      Take 2,000 mcg by mouth Daily. HOLD PRIOR TO SURG       Zinc 50 MG capsule      Take  by mouth. HOLD PRIOR TO SURG                As always, it has been a pleasure to participate in your patient's care.      Sincerely,     Jovita MEDINA

## 2021-10-07 ENCOUNTER — TELEPHONE (OUTPATIENT)
Dept: CARDIOLOGY | Facility: CLINIC | Age: 76
End: 2021-10-07

## 2021-10-07 ENCOUNTER — HOME CARE VISIT (OUTPATIENT)
Dept: HOME HEALTH SERVICES | Facility: HOME HEALTHCARE | Age: 76
End: 2021-10-07

## 2021-10-07 PROCEDURE — G0495 RN CARE TRAIN/EDU IN HH: HCPCS

## 2021-10-07 RX ORDER — CEPHALEXIN 500 MG/1
500 CAPSULE ORAL 4 TIMES DAILY
Qty: 40 CAPSULE | Refills: 0 | Status: SHIPPED | OUTPATIENT
Start: 2021-10-07 | End: 2021-10-17

## 2021-10-07 RX ORDER — CEPHALEXIN 500 MG/1
500 CAPSULE ORAL 4 TIMES DAILY
Qty: 40 CAPSULE | Refills: 0 | Status: SHIPPED | OUTPATIENT
Start: 2021-10-07 | End: 2021-10-07

## 2021-10-07 NOTE — TELEPHONE ENCOUNTER
----- Message from CAROL Arevalo sent at 10/7/2021 10:00 AM EDT -----  Can you call this patient and let them know I looked at the leg ultrasound but there is no blood clot. Looks like some thrombophlebitis. I spoke with Dr. Yu and because of the redness and heat of the let we are going to put him on antibiotics for 10 days. I sent a script for keflex 500mg 4x day to his [facility] pharmacy

## 2021-10-07 NOTE — TELEPHONE ENCOUNTER
----- Message from CAROL Arevalo sent at 10/6/2021  4:08 PM EDT -----  Let home health know that I need some labs on Monday for him. It's a BMP, I ordered. thanks

## 2021-10-07 NOTE — TELEPHONE ENCOUNTER
Reviewed with the patient and verified this was the correct pharmacy.    Thank you,  Kasey Campbell RN  [facility]  Triage

## 2021-10-08 ENCOUNTER — TELEPHONE (OUTPATIENT)
Dept: CARDIOLOGY | Facility: CLINIC | Age: 76
End: 2021-10-08

## 2021-10-08 ENCOUNTER — READMISSION MANAGEMENT (OUTPATIENT)
Dept: CALL CENTER | Facility: HOSPITAL | Age: 76
End: 2021-10-08

## 2021-10-08 ENCOUNTER — TELEPHONE (OUTPATIENT)
Dept: CARDIAC REHAB | Facility: HOSPITAL | Age: 76
End: 2021-10-08

## 2021-10-08 ENCOUNTER — HOME CARE VISIT (OUTPATIENT)
Dept: HOME HEALTH SERVICES | Facility: HOME HEALTHCARE | Age: 76
End: 2021-10-08

## 2021-10-08 VITALS
OXYGEN SATURATION: 98 % | SYSTOLIC BLOOD PRESSURE: 138 MMHG | DIASTOLIC BLOOD PRESSURE: 80 MMHG | TEMPERATURE: 96.4 F | RESPIRATION RATE: 16 BRPM | HEART RATE: 78 BPM

## 2021-10-08 DIAGNOSIS — I50.31 ACUTE DIASTOLIC CHF (CONGESTIVE HEART FAILURE) (HCC): Primary | ICD-10-CM

## 2021-10-08 NOTE — TELEPHONE ENCOUNTER
Spoke with patient today, stated he would call to schedule cardiac rehab when he has completed home health and ready to start. Provided our contact information.

## 2021-10-08 NOTE — TELEPHONE ENCOUNTER
----- Message from Vania Cottrell RN sent at 10/8/2021  9:13 AM EDT -----  Katheryn- We received the fax that was sent yesterday early afternoon regarding Mati but it only contain lab results. It looks like you indicated in your Epic notes that you had faxed lab orders but we are not able to locate them. I saw a note that Jovita Wise is needing home RN to draw a BMP Monday, is there any other labs needed to be obtained on that day? Thanks,   Vania HEAD/[facility] [phone]

## 2021-10-08 NOTE — TELEPHONE ENCOUNTER
Apparently I faxed yesterdays lab orders to home health. Can you put one in for the labs he needs on Monday. Thanks.    DA

## 2021-10-08 NOTE — OUTREACH NOTE
CT Surgery Week 3 Survey      Responses   [facility] patient discharged from?  [place]   Does the patient have one of the following disease processes/diagnoses(primary or secondary)?  Cardiothoracic surgery   Week 3 attempt successful?  No   Unsuccessful attempts  Attempt 1          Jacky Casey RN

## 2021-10-08 NOTE — CASE COMMUNICATION
"  Rehana (&Candida)- I received lab order for BMP on your patient to be drawn on Monday 10/11- I see that is already a scheduled visit to see him on that day. Dx-CHF. Patient was seen by Jovita Wise APRN/cardiology 10/6. I copied the office notes of Ms. Wise below so you can review to see patient's symptoms. Thanks         \"Since he has been out of the hospital he has noticed that he is still having some trouble with dyspnea with exertion.  He is also had some worsening swelling in his legs especially his right leg and it is warm and hot on the right calf.  He noticed a dry cough as well.  He is also having issues with constipation and is taking MiraLAX every day but this is not working.  He is not having any heart racing or palpitations but his blood pressure is running about 140.  When he was in the hospital he had to see nephrology for acute kidney injury and his creatinine was 1.5 on discharge...\"         \"...In terms of his constipation I am getting put him on Colace 50 mg twice a day in addition to his MiraLAX.  It could be his Niferex that is causing constipation in addition to pain medicine I want him to follow-up with his family doctor about this.  I think he could have a DVT in his right leg where he had some vein graft Endo harvesting.  Some get a send him for stat venous duplex of the right lower extremity.  I am also getting get some labs check his kidney function check a BNP for CHF.  And start him on 40 of Lasix a day.  I want home health to draw some labs on Monday and I am getting get some labs today and he is going to follow-up with nephrology on Friday...\"        "

## 2021-10-11 ENCOUNTER — LAB REQUISITION (OUTPATIENT)
Dept: LAB | Facility: HOSPITAL | Age: 76
End: 2021-10-11

## 2021-10-11 ENCOUNTER — HOME CARE VISIT (OUTPATIENT)
Dept: HOME HEALTH SERVICES | Facility: HOME HEALTHCARE | Age: 76
End: 2021-10-11

## 2021-10-11 VITALS
WEIGHT: 226 LBS | SYSTOLIC BLOOD PRESSURE: 140 MMHG | BODY MASS INDEX: 28.25 KG/M2 | RESPIRATION RATE: 18 BRPM | DIASTOLIC BLOOD PRESSURE: 72 MMHG | TEMPERATURE: 97.9 F | HEART RATE: 85 BPM | OXYGEN SATURATION: 99 %

## 2021-10-11 DIAGNOSIS — I50.9 HEART FAILURE, UNSPECIFIED (HCC): ICD-10-CM

## 2021-10-11 LAB
ANION GAP SERPL CALCULATED.3IONS-SCNC: 13 MMOL/L (ref 5–15)
BUN SERPL-MCNC: 24 MG/DL (ref 8–23)
BUN/CREAT SERPL: 16.2 (ref 7–25)
CALCIUM SPEC-SCNC: 8.8 MG/DL (ref 8.6–10.5)
CHLORIDE SERPL-SCNC: 104 MMOL/L (ref 98–107)
CO2 SERPL-SCNC: 25 MMOL/L (ref 22–29)
CREAT SERPL-MCNC: 1.48 MG/DL (ref 0.76–1.27)
GFR SERPL CREATININE-BSD FRML MDRD: 46 ML/MIN/1.73
GLUCOSE SERPL-MCNC: 164 MG/DL (ref 65–99)
POTASSIUM SERPL-SCNC: 4.1 MMOL/L (ref 3.5–5.2)
SODIUM SERPL-SCNC: 142 MMOL/L (ref 136–145)

## 2021-10-11 PROCEDURE — 80048 BASIC METABOLIC PNL TOTAL CA: CPT | Performed by: NURSE PRACTITIONER

## 2021-10-11 PROCEDURE — G0300 HHS/HOSPICE OF LPN EA 15 MIN: HCPCS

## 2021-10-11 NOTE — HOME HEALTH
[facility] FOR [facility]  ASSESS, T/I ON S/P CABG, LAB AND NEW DX CHF. PATIENT SITTING IN CHAIR ON NURSE ARRIVAL . DAUGHTER PRESENT DURING VISIT.PATIENT SEEN CARDIOLOGIST APRN 10.06.21 AND DAUGHTER VOICED APRN  DID NOT DISCUSSED DX OF CHF  NURSE ASSESS PATIENT CHEST INCISION. INCISION WELL APPROXIMATED. NO DRAINAGE. INCISION HEALED. PATIENT CONTINUES WITH RLE SWELLING, REDNESS AND WARMTH. PATIENT STARTED ON ATB KEFLEX FOR PROPHYLACTIC PRECAUTION. PATIENT HAD VENOUS DOPPLER AND STATED TEST WAS NEGATIVE FOR DVT. NURSE INSTRUCTED PATIENT TO USE ICE X 15 MINUTES. NURSE OBTAIN LAB FOR BMP AS ORDER WITH 23 G BUTTERFLY NEEDLE IN RAC PER ASEPTIC TECHNIQUE. BANDAID APPLIED AND PT. ENRRIQUE WELL, LAB  TAKEN TO Shinto . PATIENT SEEN NEPHROLOGIST ON FRIDAY AND STARTED ON LASIX. PT.DENEIS ANY CONSTIPATION ISSUE. NURSE DISCUSSED NO STRAINING WITH BMs.  NURSE DISCUSSED CARDIAC REHAB AND PAMPLET GIVEN ON CARDIAC REHAB. DISCUSSED UPCOMING DISCHARGE AND NMOC PAPERS SIGNED FOR NEXT VISIT      NEXT VISIT: DISCHARGE  PATIENT ON LASIX ...DISCUSS EATING FOOD HIGH IN POTASSIUM AND POSSIBLE K+ SUPPLEMENT

## 2021-10-12 ENCOUNTER — READMISSION MANAGEMENT (OUTPATIENT)
Dept: CALL CENTER | Facility: HOSPITAL | Age: 76
End: 2021-10-12

## 2021-10-12 NOTE — OUTREACH NOTE
CT Surgery Week 3 Survey      Responses   [facility] patient discharged from? [place]   Does the patient have one of the following disease processes/diagnoses(primary or secondary)? Cardiothoracic surgery   Week 3 attempt successful? Yes   Call start time 1458   Call end time 1504   Discharge diagnosis CABGx5    Person spoke with today (if not patient) and relationship Umm-Daughter   Meds reviewed with patient/caregiver? Yes   Is the patient having any side effects they believe may be caused by any medication additions or changes? No   Does the patient have all medications related to this admission filled (includes all antibiotics, pain medications, cardiac medications, etc.) Yes   Is the patient taking all medications as directed (includes completed medication regime)? Yes   Comments regarding appointments daughter states follow up with CT surgeon is 10/20/21   Does the patient have a primary care provider?  Yes   Does the patient have an appointment scheduled with their C/T surgeon? Yes  [daughter states follow up with CT surgeon is 10/20/21]   Comments regarding PCP Has not made appt.   Has the patient kept scheduled appointments due by today? N/A   What is the Home health agency?  [facility]   Has home health visited the patient within 72 hours of discharge? Yes   Home health comments Last visit wit H/H was 10/11/21   Psychosocial issues? No   Nursing interventions Reviewed instructions with patient   What is the patient's perception of their health status since discharge? Improving   Nursing interventions Nurse provided patient education   Nursing interventions Reassured on normal signs of recovery   Is the patient /caregiver able to teach back basic post-op care? Continue use of incentive spirometry at least 1 week post discharge,  Practice 'cough and deep breath',  No tub bath, swimming, or hot tub until instructed by MD,  Keep incision areas clean, dry and protected   Is the patient/caregiver able  to teach back signs and symptoms of incisional infection? Increased redness, swelling or pain at the incisonal site,  Increased drainage or bleeding,  Incisional warmth,  Pus or odor from incision,  Fever   Is the patient/caregiver able to teach back steps to recovery at home? Set small, achievable goals for return to baseline health,  Rest and rebuild strength, gradually increase activity,  Eat a well-balance diet   Is the patient /caregiver able to teach back the importance of cardiac rehab? Yes   Nursing interventions Provided education on importance of cardiac rehab   If the patient is a current smoker, are they able to teach back resources for cessation? Not a smoker   Is the patient/caregiver able to teach back the hierarchy of who to call/visit for symptoms/problems? PCP, Specialist, Home health nurse, Urgent Care, ED, 911 Yes   Week 3 call completed? Yes          Shirley Herrera RN

## 2021-10-13 ENCOUNTER — HOME CARE VISIT (OUTPATIENT)
Dept: HOME HEALTH SERVICES | Facility: HOME HEALTHCARE | Age: 76
End: 2021-10-13

## 2021-10-13 VITALS
RESPIRATION RATE: 20 BRPM | SYSTOLIC BLOOD PRESSURE: 146 MMHG | DIASTOLIC BLOOD PRESSURE: 60 MMHG | TEMPERATURE: 97.3 F | OXYGEN SATURATION: 93 % | HEART RATE: 85 BPM

## 2021-10-13 PROCEDURE — G0151 HHCP-SERV OF PT,EA 15 MIN: HCPCS

## 2021-10-13 NOTE — HOME HEALTH
Pt presented sitting in his recliner. He reports he has been using LE restorator and massager. Advised against the massager for his calf's as there was an acute right lower extremity superficial thrombophlebitis noted in the varicosity (above knee). He verbalized understanding.   Reviewed pt's medications and he did not realize it was ok for him to resume to B12.    He continues to have moderate edema in R LE.  TEDS in place. Discussed with pt and daughter (TEDS on during the day, off at HS).   No falls reported   Agreeable with PT DC. Plans to have agency DC next week, MD appt Wednesday, then hopefully he will start cardiac rehab.   .

## 2021-10-18 ENCOUNTER — HOME CARE VISIT (OUTPATIENT)
Dept: HOME HEALTH SERVICES | Facility: HOME HEALTHCARE | Age: 76
End: 2021-10-18

## 2021-10-18 VITALS
DIASTOLIC BLOOD PRESSURE: 68 MMHG | OXYGEN SATURATION: 97 % | TEMPERATURE: 98.2 F | SYSTOLIC BLOOD PRESSURE: 138 MMHG | RESPIRATION RATE: 20 BRPM | HEART RATE: 79 BPM

## 2021-10-18 PROCEDURE — G0299 HHS/HOSPICE OF RN EA 15 MIN: HCPCS

## 2021-10-19 ENCOUNTER — OFFICE VISIT (OUTPATIENT)
Dept: CARDIAC SURGERY | Facility: CLINIC | Age: 76
End: 2021-10-19

## 2021-10-19 VITALS
HEIGHT: 77 IN | TEMPERATURE: 97.3 F | WEIGHT: 224 LBS | OXYGEN SATURATION: 96 % | HEART RATE: 77 BPM | BODY MASS INDEX: 26.45 KG/M2 | DIASTOLIC BLOOD PRESSURE: 73 MMHG | SYSTOLIC BLOOD PRESSURE: 146 MMHG | RESPIRATION RATE: 20 BRPM

## 2021-10-19 DIAGNOSIS — Z98.890 S/P MAZE OPERATION FOR ATRIAL FIBRILLATION: ICD-10-CM

## 2021-10-19 DIAGNOSIS — Z95.1 S/P CABG (CORONARY ARTERY BYPASS GRAFT): Primary | ICD-10-CM

## 2021-10-19 DIAGNOSIS — Z86.79 S/P MAZE OPERATION FOR ATRIAL FIBRILLATION: ICD-10-CM

## 2021-10-19 DIAGNOSIS — Z98.890 H/O MITRAL VALVE REPAIR: ICD-10-CM

## 2021-10-19 PROCEDURE — 99024 POSTOP FOLLOW-UP VISIT: CPT | Performed by: NURSE PRACTITIONER

## 2021-10-19 RX ORDER — CEPHALEXIN 500 MG/1
500 CAPSULE ORAL 4 TIMES DAILY
Qty: 28 CAPSULE | Refills: 0 | Status: SHIPPED | OUTPATIENT
Start: 2021-10-19 | End: 2021-10-26

## 2021-10-19 NOTE — PROGRESS NOTES
"CARDIOVASCULAR SURGERY FOLLOW-UP PROGRESS NOTE  Chief Complaint: Post-op Follow Up        HPI:   Dear Madison Reese MD and colleagues:    It was nice to see Lucas Maynard in follow up today after cardiac surgery.  As you know, he is a 76 y.o. male with severe MV CAD, chronic persistent atrial fib, moderate mitral incompetence, CKD stage 3, HTN, and HLD  who underwent CABG/ radical mitral valve repair/ maze procedure at [facility] by Dr. Yu on 9/14/2021. He did well postoperatively except for ANNA on CKD and some atrial fib postop. He comes in today complaining of mild SOA, decreased stamina--although improved since discharge, and continued erythema of his RLE.  His activity level has been fair.  He was discharged from home health yesterday.  He plans to start cardiac rehab on Thursday.  He has been doing home exercises and bought a small exercise bike to work his legs.  He has completed his Niferex and Keflex he has been prescribed.  He is alone for his appt today.    Physical Exam:         /73 (BP Location: Left arm, Patient Position: Sitting, Cuff Size: Adult)   Pulse 77   Temp 97.3 °F (36.3 °C) (Oral)   Resp 20   Ht 195.6 cm (77\")   Wt 102 kg (224 lb)   SpO2 96%   BMI 26.56 kg/m²   Heart:  regular rate and rhythm  Lungs:  clear to auscultation bilaterally  Extremities:  trace and pitting lower extremity edema on right  Incision(s):  mid chest healing well, right leg with erythema, no drainage, surgical incision is healing well, sternum stable    Assessment/Plan:     S/P CABG, mitral valve repair and maze procedure. Overall, he is doing well.  He has seen cardiology NP.  Venous doppler checked of RLE, which was negative for DVT.  He should continue his current medication regimen.  I reordered Keflex for 7 days.  He will be seen back in office next Tuesday to re-evaluate his leg.  He wants to complete cardiac rehab and then head to his [facility].  He reports Dr. Yu has set him up " with a cardiologist there.    Post-op blood loss anemia on iron    Keep incisions clean and dry  OK to drive if not taking narcotic pain medicine  OK to begin cardiac rehab  Follow-up as scheduled with cardiology  Follow-up with CT surgery next Tuesday    Current outpatient and discharge medications have been reconciled for the patient.  Reviewed by: CAROL Laurent    He should continue sternal precautions for a total of 6 weeks.      Thank you for allowing me to participate in the care of your patient.    Regards,  CAROL Bower       Tumor Debulked?: curette

## 2021-10-20 ENCOUNTER — READMISSION MANAGEMENT (OUTPATIENT)
Dept: CALL CENTER | Facility: HOSPITAL | Age: 76
End: 2021-10-20

## 2021-10-20 NOTE — OUTREACH NOTE
CT Surgery Week 4 Survey      Responses   [facility] patient discharged from? [place]   Does the patient have one of the following disease processes/diagnoses(primary or secondary)? Cardiothoracic surgery   Week 4 attempt successful? No          Araceli Cross RN

## 2021-10-25 ENCOUNTER — TREATMENT (OUTPATIENT)
Dept: CARDIAC REHAB | Facility: HOSPITAL | Age: 76
End: 2021-10-25

## 2021-10-25 DIAGNOSIS — Z98.890 S/P MITRAL VALVE REPAIR: ICD-10-CM

## 2021-10-25 DIAGNOSIS — Z95.1 S/P CABG (CORONARY ARTERY BYPASS GRAFT): Primary | ICD-10-CM

## 2021-10-25 PROCEDURE — 93798 PHYS/QHP OP CAR RHAB W/ECG: CPT

## 2021-10-26 ENCOUNTER — OFFICE VISIT (OUTPATIENT)
Dept: CARDIAC SURGERY | Facility: CLINIC | Age: 76
End: 2021-10-26

## 2021-10-26 VITALS
HEIGHT: 77 IN | BODY MASS INDEX: 26.33 KG/M2 | HEART RATE: 75 BPM | SYSTOLIC BLOOD PRESSURE: 157 MMHG | OXYGEN SATURATION: 99 % | WEIGHT: 223 LBS | TEMPERATURE: 97.1 F | DIASTOLIC BLOOD PRESSURE: 80 MMHG | RESPIRATION RATE: 20 BRPM

## 2021-10-26 DIAGNOSIS — Z98.890 H/O MITRAL VALVE REPAIR: ICD-10-CM

## 2021-10-26 DIAGNOSIS — N18.30 STAGE 3 CHRONIC KIDNEY DISEASE, UNSPECIFIED WHETHER STAGE 3A OR 3B CKD (HCC): ICD-10-CM

## 2021-10-26 DIAGNOSIS — Z95.1 S/P CABG (CORONARY ARTERY BYPASS GRAFT): Primary | ICD-10-CM

## 2021-10-26 DIAGNOSIS — Z86.79 S/P MAZE OPERATION FOR ATRIAL FIBRILLATION: ICD-10-CM

## 2021-10-26 DIAGNOSIS — Z98.890 S/P MAZE OPERATION FOR ATRIAL FIBRILLATION: ICD-10-CM

## 2021-10-26 PROCEDURE — 99024 POSTOP FOLLOW-UP VISIT: CPT | Performed by: NURSE PRACTITIONER

## 2021-10-26 RX ORDER — LISINOPRIL 5 MG/1
5 TABLET ORAL DAILY
Qty: 30 TABLET | Refills: 3 | Status: SHIPPED | OUTPATIENT
Start: 2021-10-26 | End: 2021-10-26

## 2021-10-26 RX ORDER — LISINOPRIL 10 MG/1
10 TABLET ORAL DAILY
Qty: 30 TABLET | Refills: 3 | Status: SHIPPED | OUTPATIENT
Start: 2021-10-26 | End: 2021-10-27

## 2021-10-26 NOTE — PROGRESS NOTES
"CARDIOVASCULAR SURGERY FOLLOW-UP PROGRESS NOTE  Chief Complaint: Post-op Follow Up        HPI:   Dear Madison Reese MD and colleagues:    It was nice to see Lucas Maynard in follow up today after cardiac surgery.  As you know, he is a 76 y.o. male with recent CABG, mitral valve repair, and maze procedure by Dr. Yu at [facility]. He returns to clinic today for his RLE to be re-evaluated.  He has had erythema and edema of his RLE that he reports was not an issue prior to cardiac surgery.  He has a negative venous doppler and has been on 2 rounds of Keflex.  He was wearing WILFRIDO hose initially but quit using them because his knee would be \"twice it's normal size.\"  His weight has been trending down, he brought his vital sign log with him.  His sternal incision is well healed.  He has started cardiac rehab now.      Physical Exam:         /80 (BP Location: Left arm, Patient Position: Sitting, Cuff Size: Adult)   Pulse 75   Temp 97.1 °F (36.2 °C) (Oral)   Resp 20   Ht 195.6 cm (77\")   Wt 101 kg (223 lb)   SpO2 99%   BMI 26.44 kg/m²   Heart:  regular rate and rhythm  Lungs:  clear to auscultation bilaterally  Extremities:  1+ and pitting lower extremity edema bilaterally  Incision(s):  mid chest healing well, sternum stable    Assessment/Plan:     S/P CABG, mitral valve repair, and maze procedure. Overall, he is doing well.  He continues to have BLE edema.  There is some mild erythema but it is felt to be d/t his edema.  Dr. Yu evaluated his leg as well.  We discontinued his amlodipine and restarted his lisinopril after d/w Dr. Enamorado.  His recommendations are to restart lisinopril at 10 mg given his elevated BP and ok to continue furosemide 40 mg daily.  BMP in 2 weeks with close follow up with nephrology.  Thigh high WILFRIDO hose bilaterally or ace wrapping to the thighs.  He has an appt to see Dr. Kelsey tomorrow and has started cardiac rehab.  Additionally, he is asking how he knows if his " anemia has improved.  He has completed a month of iron but is not interested in walking over to the lab for a CBC.  He will discuss this with Dr. Kelsey.    Post-op ANNA on CKD stage 3--resolved to baseline.  Dr. Enamorado follows pt.    Follow-up with CT surgery prn  BMP in 2 weeks.    No restrictions of activity.      Thank you for allowing me to participate in the care of your patient.    Regards,  Lorna Chicas, APRN

## 2021-10-27 ENCOUNTER — TREATMENT (OUTPATIENT)
Dept: CARDIAC REHAB | Facility: HOSPITAL | Age: 76
End: 2021-10-27

## 2021-10-27 ENCOUNTER — OFFICE VISIT (OUTPATIENT)
Dept: CARDIOLOGY | Facility: CLINIC | Age: 76
End: 2021-10-27

## 2021-10-27 VITALS
SYSTOLIC BLOOD PRESSURE: 112 MMHG | BODY MASS INDEX: 27.04 KG/M2 | HEART RATE: 78 BPM | HEIGHT: 77 IN | WEIGHT: 229 LBS | DIASTOLIC BLOOD PRESSURE: 48 MMHG

## 2021-10-27 DIAGNOSIS — Z98.890 H/O MITRAL VALVE REPAIR: ICD-10-CM

## 2021-10-27 DIAGNOSIS — I48.19 PERSISTENT ATRIAL FIBRILLATION (HCC): ICD-10-CM

## 2021-10-27 DIAGNOSIS — Z98.890 S/P MAZE OPERATION FOR ATRIAL FIBRILLATION: ICD-10-CM

## 2021-10-27 DIAGNOSIS — I42.0 DILATED CARDIOMYOPATHY (HCC): Primary | ICD-10-CM

## 2021-10-27 DIAGNOSIS — Z95.1 S/P CABG (CORONARY ARTERY BYPASS GRAFT): Primary | ICD-10-CM

## 2021-10-27 DIAGNOSIS — Z98.890 S/P MITRAL VALVE REPAIR: ICD-10-CM

## 2021-10-27 DIAGNOSIS — Z86.79 S/P MAZE OPERATION FOR ATRIAL FIBRILLATION: ICD-10-CM

## 2021-10-27 DIAGNOSIS — Z95.1 S/P CABG (CORONARY ARTERY BYPASS GRAFT): ICD-10-CM

## 2021-10-27 PROCEDURE — 99214 OFFICE O/P EST MOD 30 MIN: CPT | Performed by: INTERNAL MEDICINE

## 2021-10-27 PROCEDURE — 93798 PHYS/QHP OP CAR RHAB W/ECG: CPT

## 2021-10-27 RX ORDER — LOSARTAN POTASSIUM 25 MG/1
25 TABLET ORAL DAILY
Qty: 90 TABLET | Refills: 3 | Status: SHIPPED | OUTPATIENT
Start: 2021-10-27 | End: 2022-11-07

## 2021-10-27 RX ORDER — METOPROLOL SUCCINATE 50 MG/1
50 TABLET, EXTENDED RELEASE ORAL
Qty: 90 TABLET | Refills: 2 | Status: SHIPPED | OUTPATIENT
Start: 2021-10-27 | End: 2022-03-24 | Stop reason: SDUPTHER

## 2021-10-27 NOTE — PROGRESS NOTES
Date of Office Visit: 10/27/21  Encounter Provider: Jean Pierre Kelsey MD  Place of Service: [facility] CARDIOLOGY  Patient Name: Lucas Maynard  :1945  [id number]    Chief Complaint   Patient presents with   • Coronary Artery Disease   :     HPI: Lucas Maynard is a 76 y.o. male who had in 2021 a CABG x 5 with SANTOS to LAD, SVG to PDA, SVG to OM1, sequential SVG to Diag 1 and Diag2 with cryo-maze and resection and closure of left atrial appendage and mitral valve repair by Dr Yu.  EF preoperatively was 40 to 45%.  Since surgery he has been cold he has not been anticoagulated his choice and has had a lot of swelling in his right leg he had an ultrasound of that leg it did not really show much saw the CV surgery people last week and they stopped his amlodipine put him on lisinopril but he does have this dry cough that has had since surgery no PND orthopnea edema his wounds healed well    Past Medical History:   Diagnosis Date   • Atrial fibrillation (HCC)    • CAD (coronary artery disease)    • Cancer (HCC)     prostate biopsy only no radiation   • Cardiomyopathy (HCC)    • Carotid stenosis    • DDD (degenerative disc disease), lumbar    • Disease of thyroid gland    • GERD (gastroesophageal reflux disease)    • Hyperlipidemia    • Mitral valve stenosis    • Murmur, heart    • SOB (shortness of breath)        Past Surgical History:   Procedure Laterality Date   • CARDIAC CATHETERIZATION N/A 2021    Procedure: Coronary angiography;  Surgeon: Jean Pierre Kelsey MD;  Location: [facility] CATH INVASIVE LOCATION;  Service: Cardiovascular;  Laterality: N/A;   • CARDIAC CATHETERIZATION N/A 2021    Procedure: Left Heart Cath;  Surgeon: Jean Pierre Kelsey MD;  Location: [facility] CATH INVASIVE LOCATION;  Service: Cardiovascular;  Laterality: N/A;   • CARDIAC CATHETERIZATION N/A 2021    Procedure: Left ventriculography;  Surgeon: Jean Pierre Kelsey MD;  Location: [facility]  INVASIVE LOCATION;  Service: Cardiovascular;  Laterality: N/A;   • CAROTID ENDARTERECTOMY Right 12/20/2018    Procedure: RIGHT CAROTID ENDARTERECTOMY;  Surgeon: Javier Acevedo MD;  Location: [facility] MAIN OR;  Service: Vascular   • CAROTID ENDARTERECTOMY Left 7/3/2019    Procedure: left CAROTID ENDARTERECTOMY;  Surgeon: Javier Acevedo MD;  Location: [facility] MAIN OR;  Service: Vascular   • CATARACT EXTRACTION, BILATERAL  2020    WITH LENS    • COLONOSCOPY N/A 7/27/2016    Procedure: COLONOSCOPY to terminal ileum with polypectomy;  Surgeon: Hi Quintanilla MD;  Location: [facility] ENDOSCOPY;  Service:    • CORONARY ARTERY BYPASS GRAFT WITH MITRAL VALVE REPAIR/REPLACEMENT N/A 9/14/2021    Procedure: INTRAOP GIRMA, CORONARY ARTERY BYPASS GRAFTING X 5 UTILIZING LEFT SANTOS AND ENDOSCOPICALLY HARVESTED RIGHT SAPHENOUS VEIN, MITRAL VALVE REPAIR/REPLACEMENT, MAZE PROCEDURE,  ATRIAL APPENDAGE CLOSURE, AND PRP;  Surgeon: Jr Flaco Yu MD;  Location: [facility] CVOR;  Service: Cardiothoracic;  Laterality: N/A;   • CYST REMOVAL Right     ANKLE   • ENDOSCOPY     • PROSTATE BIOPSY      dx 2007 cancer no surgery or radiation, MULTIPLE BIOPSIES   • TONSILLECTOMY         Social History     Socioeconomic History   • Marital status: Single   Tobacco Use   • Smoking status: Never Smoker   • Smokeless tobacco: Never Used   Substance and Sexual Activity   • Alcohol use: Not Currently     Comment: 3-4 DRINKS/WEEK   • Drug use: No   • Sexual activity: Defer       Family History   Problem Relation Age of Onset   • Malig Hyperthermia Neg Hx        Review of Systems   Constitutional: Negative for decreased appetite, fever, malaise/fatigue and weight loss.   HENT: Negative for nosebleeds.    Eyes: Negative for double vision.   Cardiovascular: Negative for chest pain, claudication, cyanosis, dyspnea on exertion, irregular heartbeat, leg swelling, near-syncope, orthopnea, palpitations, paroxysmal nocturnal dyspnea and syncope.    Respiratory: Negative for cough, hemoptysis and shortness of breath.    Hematologic/Lymphatic: Negative for bleeding problem.   Skin: Negative for rash.   Musculoskeletal: Negative for falls and myalgias.   Gastrointestinal: Negative for hematochezia, jaundice, melena, nausea and vomiting.   Genitourinary: Negative for hematuria.   Neurological: Negative for dizziness and seizures.   Psychiatric/Behavioral: Negative for altered mental status and memory loss.       Allergies   Allergen Reactions   • Avelox [Moxifloxacin Hcl] Other (See Comments)     Joint discomfort   • Linzess [Linaclotide] Diarrhea   • Rosuvastatin Unknown - Low Severity   • Statins Myalgia   • Xarelto [Rivaroxaban] Myalgia   • Eliquis [Apixaban] Rash         Current Outpatient Medications:   •  acetaminophen (TYLENOL) 325 MG tablet, Take 2 tablets by mouth Every 4 (Four) Hours As Needed for Mild Pain ., Disp: , Rfl:   •  aspirin 81 MG EC tablet, Take 81 mg by mouth Daily. INSTRUCTED PT TO FOLLOW MD INSTRUCTIONS REGARDING SURGERY, Disp: , Rfl:   •  furosemide (LASIX) 40 MG tablet, Take 1 tablet by mouth Daily., Disp: 30 tablet, Rfl: 11  •  ipratropium (ATROVENT) 0.03 % nasal spray, 2 sprays into the nostril(s) as directed by provider Every 12 (Twelve) Hours., Disp: , Rfl:   •  levothyroxine (SYNTHROID, LEVOTHROID) 50 MCG tablet, Take 50 mcg by mouth Daily., Disp: , Rfl:   •  LYCOPENE PO, Take 80 mg by mouth Daily. HOLD PRIOR TO SURG, Disp: , Rfl:   •  metoprolol succinate XL (TOPROL-XL) 50 MG 24 hr tablet, Take 1 tablet by mouth Daily for 90 days., Disp: 90 tablet, Rfl: 2  •  Multiple Vitamin (MULTI-VITAMIN DAILY PO), Take 1 tablet by mouth Daily. HOLD PRIOR TO SURG, Disp: , Rfl:   •  pantoprazole (PROTONIX) 40 MG EC tablet, Take 40 mg by mouth Every Night., Disp: , Rfl:   •  Pitavastatin Calcium (Livalo) 4 MG tablet, Take 4 mg by mouth Daily., Disp: , Rfl:   •  Probiotic Product (PROBIOTIC ADVANCED PO), Take 2 capsules by mouth Daily., Disp: ,  "Rfl:   •  vitamin B-12 (CYANOCOBALAMIN) 1000 MCG tablet, Take 2,000 mcg by mouth Daily. HOLD PRIOR TO SURG, Disp: , Rfl:   •  losartan (COZAAR) 25 MG tablet, Take 1 tablet by mouth Daily., Disp: 90 tablet, Rfl: 3  •  Zinc 50 MG capsule, Take  by mouth. HOLD PRIOR TO SURG, Disp: , Rfl:     Current Facility-Administered Medications:   •  Docusate Sodium 50 mg, 50 mg, Oral, BID, Jovita Wise, CAROL      Objective:     Vitals:    10/27/21 1427   BP: 112/48   Pulse: 78   Weight: 104 kg (229 lb)   Height: 195.6 cm (77\")     Body mass index is 27.16 kg/m².    Constitutional:       Appearance: Well-developed.   Eyes:      General: No scleral icterus.  HENT:      Head: Normocephalic.   Neck:      Thyroid: No thyromegaly.      Vascular: No JVD.      Lymphadenopathy: No cervical adenopathy.   Pulmonary:      Effort: Pulmonary effort is normal.      Breath sounds: Normal breath sounds. No wheezing. No rales.   Cardiovascular:      Normal rate. Regular rhythm.      No gallop.   Edema:     Peripheral edema present.     Feet: 1+ edema of the left foot and 2+ edema of the right foot.  Abdominal:      Palpations: Abdomen is soft.      Tenderness: There is no abdominal tenderness.   Musculoskeletal: Normal range of motion. Skin:     General: Skin is warm and dry.      Findings: No rash.   Neurological:      Mental Status: Alert and oriented to person, place, and time.           ECG 12 Lead    Date/Time: 10/27/2021 3:17 PM  Performed by: Jean Pierre Kelsey MD  Authorized by: Jean Pierre Kelsey MD                Assessment:       Diagnosis Plan   1. Dilated cardiomyopathy (HCC)     2. Persistent atrial fibrillation (HCC)     3. S/P CABG (coronary artery bypass graft)     4. S/P Maze operation for atrial fibrillation     5. H/O mitral valve repair            Plan:       In general I think he is recovered pretty well no change from his medicines wound is cold all the time probably that is from his beta-blocker he was only on 25 of " metoprolol preop someone to cut that back to 50 I Elaine stop his lisinopril put him on losartan although I do not really think that is from it causing the cough we remove that from the equation in addition his better blood pressure agent and with stopping the amlodipine hopefully will not have as much swelling in his feet but his blood pressure could go up I like him to come back and see Robyn in 6 weeks and around that time we will need to think about getting an echo and see if his LV function is improved I will see him back in a year    As always, it has been a pleasure to participate in your patient's care.      Sincerely,       Jean Pierre Kelsey MD

## 2021-10-29 ENCOUNTER — TREATMENT (OUTPATIENT)
Dept: CARDIAC REHAB | Facility: HOSPITAL | Age: 76
End: 2021-10-29

## 2021-10-29 DIAGNOSIS — Z95.1 S/P CABG (CORONARY ARTERY BYPASS GRAFT): Primary | ICD-10-CM

## 2021-10-29 DIAGNOSIS — Z98.890 S/P MITRAL VALVE REPAIR: ICD-10-CM

## 2021-10-29 PROCEDURE — 93798 PHYS/QHP OP CAR RHAB W/ECG: CPT

## 2021-11-01 ENCOUNTER — TELEPHONE (OUTPATIENT)
Dept: CARDIAC REHAB | Facility: HOSPITAL | Age: 76
End: 2021-11-01

## 2021-11-01 ENCOUNTER — TREATMENT (OUTPATIENT)
Dept: CARDIAC REHAB | Facility: HOSPITAL | Age: 76
End: 2021-11-01

## 2021-11-01 DIAGNOSIS — Z95.1 S/P CABG (CORONARY ARTERY BYPASS GRAFT): Primary | ICD-10-CM

## 2021-11-01 PROCEDURE — 93798 PHYS/QHP OP CAR RHAB W/ECG: CPT

## 2021-11-01 NOTE — TELEPHONE ENCOUNTER
Dr. Kelsey,  Lucas Maynard had a couplet and a questionable 3 beat ventricular run with exercise in Cardiac Rehab today. Denied any symptoms. Will fax to [phone].  Curious to what you would call the 3 beat run.  Thanks,  Queta Kaba RN

## 2021-11-02 ENCOUNTER — LAB (OUTPATIENT)
Dept: LAB | Facility: HOSPITAL | Age: 76
End: 2021-11-02

## 2021-11-02 DIAGNOSIS — N18.30 STAGE 3 CHRONIC KIDNEY DISEASE, UNSPECIFIED WHETHER STAGE 3A OR 3B CKD (HCC): ICD-10-CM

## 2021-11-02 LAB
ANION GAP SERPL CALCULATED.3IONS-SCNC: 9.3 MMOL/L (ref 5–15)
BUN SERPL-MCNC: 24 MG/DL (ref 8–23)
BUN/CREAT SERPL: 14.9 (ref 7–25)
CALCIUM SPEC-SCNC: 9.2 MG/DL (ref 8.6–10.5)
CHLORIDE SERPL-SCNC: 104 MMOL/L (ref 98–107)
CO2 SERPL-SCNC: 26.7 MMOL/L (ref 22–29)
CREAT SERPL-MCNC: 1.61 MG/DL (ref 0.76–1.27)
GFR SERPL CREATININE-BSD FRML MDRD: 42 ML/MIN/1.73
GLUCOSE SERPL-MCNC: 123 MG/DL (ref 65–99)
POTASSIUM SERPL-SCNC: 4.5 MMOL/L (ref 3.5–5.2)
SODIUM SERPL-SCNC: 140 MMOL/L (ref 136–145)

## 2021-11-02 PROCEDURE — 80048 BASIC METABOLIC PNL TOTAL CA: CPT

## 2021-11-02 PROCEDURE — 36415 COLL VENOUS BLD VENIPUNCTURE: CPT

## 2021-11-03 ENCOUNTER — TREATMENT (OUTPATIENT)
Dept: CARDIAC REHAB | Facility: HOSPITAL | Age: 76
End: 2021-11-03

## 2021-11-03 DIAGNOSIS — Z95.1 S/P CABG (CORONARY ARTERY BYPASS GRAFT): Primary | ICD-10-CM

## 2021-11-03 PROCEDURE — 93798 PHYS/QHP OP CAR RHAB W/ECG: CPT

## 2021-11-05 ENCOUNTER — TREATMENT (OUTPATIENT)
Dept: CARDIAC REHAB | Facility: HOSPITAL | Age: 76
End: 2021-11-05

## 2021-11-05 DIAGNOSIS — Z95.1 S/P CABG (CORONARY ARTERY BYPASS GRAFT): Primary | ICD-10-CM

## 2021-11-05 PROCEDURE — 93798 PHYS/QHP OP CAR RHAB W/ECG: CPT

## 2021-11-08 ENCOUNTER — TREATMENT (OUTPATIENT)
Dept: CARDIAC REHAB | Facility: HOSPITAL | Age: 76
End: 2021-11-08

## 2021-11-08 DIAGNOSIS — Z95.1 S/P CABG (CORONARY ARTERY BYPASS GRAFT): Primary | ICD-10-CM

## 2021-11-08 PROCEDURE — 93798 PHYS/QHP OP CAR RHAB W/ECG: CPT

## 2021-11-10 ENCOUNTER — TREATMENT (OUTPATIENT)
Dept: CARDIAC REHAB | Facility: HOSPITAL | Age: 76
End: 2021-11-10

## 2021-11-10 DIAGNOSIS — Z95.1 S/P CABG (CORONARY ARTERY BYPASS GRAFT): Primary | ICD-10-CM

## 2021-11-10 DIAGNOSIS — Z98.890 S/P MITRAL VALVE REPAIR: ICD-10-CM

## 2021-11-10 PROCEDURE — 93798 PHYS/QHP OP CAR RHAB W/ECG: CPT

## 2021-11-12 ENCOUNTER — TREATMENT (OUTPATIENT)
Dept: CARDIAC REHAB | Facility: HOSPITAL | Age: 76
End: 2021-11-12

## 2021-11-12 DIAGNOSIS — Z95.1 S/P CABG (CORONARY ARTERY BYPASS GRAFT): Primary | ICD-10-CM

## 2021-11-12 DIAGNOSIS — Z98.890 S/P MITRAL VALVE REPAIR: ICD-10-CM

## 2021-11-12 PROCEDURE — 93798 PHYS/QHP OP CAR RHAB W/ECG: CPT

## 2021-11-15 ENCOUNTER — TREATMENT (OUTPATIENT)
Dept: CARDIAC REHAB | Facility: HOSPITAL | Age: 76
End: 2021-11-15

## 2021-11-15 DIAGNOSIS — Z95.1 S/P CABG (CORONARY ARTERY BYPASS GRAFT): Primary | ICD-10-CM

## 2021-11-15 PROCEDURE — 93798 PHYS/QHP OP CAR RHAB W/ECG: CPT

## 2021-11-17 ENCOUNTER — TREATMENT (OUTPATIENT)
Dept: CARDIAC REHAB | Facility: HOSPITAL | Age: 76
End: 2021-11-17

## 2021-11-17 DIAGNOSIS — Z95.1 S/P CABG (CORONARY ARTERY BYPASS GRAFT): Primary | ICD-10-CM

## 2021-11-17 PROCEDURE — 93798 PHYS/QHP OP CAR RHAB W/ECG: CPT

## 2021-11-19 ENCOUNTER — TREATMENT (OUTPATIENT)
Dept: CARDIAC REHAB | Facility: HOSPITAL | Age: 76
End: 2021-11-19

## 2021-11-19 DIAGNOSIS — Z98.890 S/P MITRAL VALVE REPAIR: ICD-10-CM

## 2021-11-19 DIAGNOSIS — Z95.1 S/P CABG (CORONARY ARTERY BYPASS GRAFT): Primary | ICD-10-CM

## 2021-11-19 PROCEDURE — 93798 PHYS/QHP OP CAR RHAB W/ECG: CPT

## 2021-11-22 ENCOUNTER — TREATMENT (OUTPATIENT)
Dept: CARDIAC REHAB | Facility: HOSPITAL | Age: 76
End: 2021-11-22

## 2021-11-22 DIAGNOSIS — Z95.1 S/P CABG (CORONARY ARTERY BYPASS GRAFT): Primary | ICD-10-CM

## 2021-11-22 PROCEDURE — 93798 PHYS/QHP OP CAR RHAB W/ECG: CPT

## 2021-11-24 ENCOUNTER — TREATMENT (OUTPATIENT)
Dept: CARDIAC REHAB | Facility: HOSPITAL | Age: 76
End: 2021-11-24

## 2021-11-24 DIAGNOSIS — Z98.890 S/P MITRAL VALVE REPAIR: ICD-10-CM

## 2021-11-24 DIAGNOSIS — Z95.1 S/P CABG (CORONARY ARTERY BYPASS GRAFT): Primary | ICD-10-CM

## 2021-11-24 PROCEDURE — 93798 PHYS/QHP OP CAR RHAB W/ECG: CPT

## 2021-11-29 ENCOUNTER — TREATMENT (OUTPATIENT)
Dept: CARDIAC REHAB | Facility: HOSPITAL | Age: 76
End: 2021-11-29

## 2021-11-29 DIAGNOSIS — Z95.1 S/P CABG (CORONARY ARTERY BYPASS GRAFT): Primary | ICD-10-CM

## 2021-11-29 PROCEDURE — 93798 PHYS/QHP OP CAR RHAB W/ECG: CPT

## 2021-12-01 ENCOUNTER — TREATMENT (OUTPATIENT)
Dept: CARDIAC REHAB | Facility: HOSPITAL | Age: 76
End: 2021-12-01

## 2021-12-01 DIAGNOSIS — Z95.1 S/P CABG (CORONARY ARTERY BYPASS GRAFT): Primary | ICD-10-CM

## 2021-12-01 PROCEDURE — 93798 PHYS/QHP OP CAR RHAB W/ECG: CPT

## 2021-12-03 ENCOUNTER — TREATMENT (OUTPATIENT)
Dept: CARDIAC REHAB | Facility: HOSPITAL | Age: 76
End: 2021-12-03

## 2021-12-03 DIAGNOSIS — Z95.1 S/P CABG (CORONARY ARTERY BYPASS GRAFT): Primary | ICD-10-CM

## 2021-12-03 PROCEDURE — 93798 PHYS/QHP OP CAR RHAB W/ECG: CPT

## 2021-12-06 ENCOUNTER — TREATMENT (OUTPATIENT)
Dept: CARDIAC REHAB | Facility: HOSPITAL | Age: 76
End: 2021-12-06

## 2021-12-06 DIAGNOSIS — Z95.1 S/P CABG (CORONARY ARTERY BYPASS GRAFT): Primary | ICD-10-CM

## 2021-12-06 PROCEDURE — 93798 PHYS/QHP OP CAR RHAB W/ECG: CPT

## 2021-12-08 ENCOUNTER — TREATMENT (OUTPATIENT)
Dept: CARDIAC REHAB | Facility: HOSPITAL | Age: 76
End: 2021-12-08

## 2021-12-08 DIAGNOSIS — Z95.1 S/P CABG (CORONARY ARTERY BYPASS GRAFT): Primary | ICD-10-CM

## 2021-12-08 PROCEDURE — 93798 PHYS/QHP OP CAR RHAB W/ECG: CPT

## 2021-12-13 ENCOUNTER — TREATMENT (OUTPATIENT)
Dept: CARDIAC REHAB | Facility: HOSPITAL | Age: 76
End: 2021-12-13

## 2021-12-13 DIAGNOSIS — Z95.1 S/P CABG (CORONARY ARTERY BYPASS GRAFT): Primary | ICD-10-CM

## 2021-12-13 PROCEDURE — 93798 PHYS/QHP OP CAR RHAB W/ECG: CPT

## 2021-12-15 ENCOUNTER — TREATMENT (OUTPATIENT)
Dept: CARDIAC REHAB | Facility: HOSPITAL | Age: 76
End: 2021-12-15

## 2021-12-15 DIAGNOSIS — Z95.1 S/P CABG (CORONARY ARTERY BYPASS GRAFT): Primary | ICD-10-CM

## 2021-12-15 PROCEDURE — 93798 PHYS/QHP OP CAR RHAB W/ECG: CPT

## 2021-12-16 ENCOUNTER — OFFICE VISIT (OUTPATIENT)
Dept: CARDIOLOGY | Facility: CLINIC | Age: 76
End: 2021-12-16

## 2021-12-16 VITALS
HEART RATE: 71 BPM | BODY MASS INDEX: 27.04 KG/M2 | DIASTOLIC BLOOD PRESSURE: 70 MMHG | SYSTOLIC BLOOD PRESSURE: 130 MMHG | WEIGHT: 229 LBS | HEIGHT: 77 IN

## 2021-12-16 DIAGNOSIS — I25.118 CORONARY ARTERY DISEASE OF NATIVE ARTERY OF NATIVE HEART WITH STABLE ANGINA PECTORIS (HCC): Primary | ICD-10-CM

## 2021-12-16 DIAGNOSIS — R42 LIGHTHEADEDNESS: ICD-10-CM

## 2021-12-16 DIAGNOSIS — Z86.79 S/P MAZE OPERATION FOR ATRIAL FIBRILLATION: ICD-10-CM

## 2021-12-16 DIAGNOSIS — I48.19 PERSISTENT ATRIAL FIBRILLATION (HCC): ICD-10-CM

## 2021-12-16 DIAGNOSIS — Z95.1 S/P CABG (CORONARY ARTERY BYPASS GRAFT): ICD-10-CM

## 2021-12-16 DIAGNOSIS — Z98.890 S/P MAZE OPERATION FOR ATRIAL FIBRILLATION: ICD-10-CM

## 2021-12-16 DIAGNOSIS — I25.5 ISCHEMIC CARDIOMYOPATHY: ICD-10-CM

## 2021-12-16 PROCEDURE — 99214 OFFICE O/P EST MOD 30 MIN: CPT | Performed by: NURSE PRACTITIONER

## 2021-12-16 PROCEDURE — 93000 ELECTROCARDIOGRAM COMPLETE: CPT | Performed by: NURSE PRACTITIONER

## 2021-12-16 RX ORDER — PRAVASTATIN SODIUM 10 MG
10 TABLET ORAL DAILY
Qty: 30 TABLET | Refills: 2 | Status: SHIPPED | OUTPATIENT
Start: 2021-12-16 | End: 2022-03-09

## 2021-12-16 RX ORDER — DOXYCYCLINE HYCLATE 50 MG/1
1 CAPSULE, GELATIN COATED ORAL
COMMUNITY
Start: 2021-12-02 | End: 2022-11-18 | Stop reason: ALTCHOICE

## 2021-12-16 NOTE — PROGRESS NOTES
Date of Office Visit: 2021  Encounter Provider: CAROL Gaona  Place of Service: [facility] CARDIOLOGY  Patient Name: Lucas Maynard  :1945    Chief Complaint   Patient presents with   • Coronary Artery Disease   :     HPI: Lucas Maynard is a 76 y.o. male.  He is a patient of Dr. Kelsey's whom we follow for the management of persistent atrial fibrillation and moderate regurgitation. In August of this year, he presented with dyspnea and left upper chest discomfort on exertion. He underwent a stress test and echocardiogram. The echocardiogram demonstrated mildly decreased LV function with an EF of 43%, moderate MR, and mild pulmonary hypertension. The stress test was negative for ischemia. However, he continued having symptoms and was ultimately referred for cardiac catheterization. Ultimately, he underwent a CABG x 5, cryomaze, and left atrial appendage closure. Due to acute kidney injury, renal was consulted for management.  Fortunately, his renal function returned to baseline by discharge.  He also had some postoperative paroxysmal atrial fibrillation.  However, he made the personal decision to forego anticoagulation.   He was last seen in the office by Dr. Kelsey in October at which time he was doing well with no complaints of angina or heart failure.  He was reporting a dry cough and right leg swelling.  Dr. Kelsey switched him to losartan and stopped amlodipine.  He was advised to follow-up in 6 weeks.   He has a few concerns today. He is experiencing occasional nighttime chills which have overall lessened in frequency. Additionally, his right hand seems to get cold at night. He has been struggling with lightheadedness and balance issues. He states he cannot walk a straight line very well. He reports of lower extremity weakness which is very frustrating for him because his legs have always been his strongest. He still reports right lower extremity swelling.  Evidently the Livalo is no longer affordable. He just ran out of his last pill 3 days ago. Interestingly, his legs do feel better since then. He denies any chest pain, palpitations, or syncope. He has some mild shortness of breath on exertion that is unchanged which he attributes to having not fully recovered. He is still participating in cardiac rehab.    Past Medical History:   Diagnosis Date   • Atrial fibrillation (HCC)    • CAD (coronary artery disease)    • Cancer (HCC) 2007    prostate biopsy only no radiation   • Cardiomyopathy (HCC)    • Carotid stenosis    • DDD (degenerative disc disease), lumbar    • Disease of thyroid gland    • GERD (gastroesophageal reflux disease)    • Hyperlipidemia    • Mitral valve stenosis    • Murmur, heart    • SOB (shortness of breath)        Past Surgical History:   Procedure Laterality Date   • CARDIAC CATHETERIZATION N/A 8/30/2021    Procedure: Coronary angiography;  Surgeon: Jean Pierre Kelsey MD;  Location: [facility] INVASIVE LOCATION;  Service: Cardiovascular;  Laterality: N/A;   • CARDIAC CATHETERIZATION N/A 8/30/2021    Procedure: Left Heart Cath;  Surgeon: Jean Pierre Kelsey MD;  Location: [facility] INVASIVE LOCATION;  Service: Cardiovascular;  Laterality: N/A;   • CARDIAC CATHETERIZATION N/A 8/30/2021    Procedure: Left ventriculography;  Surgeon: Jean Pierre Kelsey MD;  Location: [facility] INVASIVE LOCATION;  Service: Cardiovascular;  Laterality: N/A;   • CAROTID ENDARTERECTOMY Right 12/20/2018    Procedure: RIGHT CAROTID ENDARTERECTOMY;  Surgeon: Javier Acevedo MD;  Location: [facility] OR;  Service: Vascular   • CAROTID ENDARTERECTOMY Left 7/3/2019    Procedure: left CAROTID ENDARTERECTOMY;  Surgeon: Javier Acevedo MD;  Location: [facility] OR;  Service: Vascular   • CATARACT EXTRACTION, BILATERAL  2020    WITH LENS    • COLONOSCOPY N/A 7/27/2016    Procedure: COLONOSCOPY to terminal ileum with polypectomy;  Surgeon: Hi Quintanilla MD;  Location:   [facility];  Service:    • CORONARY ARTERY BYPASS GRAFT WITH MITRAL VALVE REPAIR/REPLACEMENT N/A 9/14/2021    Procedure: INTRAOP GIRMA, CORONARY ARTERY BYPASS GRAFTING X 5 UTILIZING LEFT SANTOS AND ENDOSCOPICALLY HARVESTED RIGHT SAPHENOUS VEIN, MITRAL VALVE REPAIR/REPLACEMENT, MAZE PROCEDURE,  ATRIAL APPENDAGE CLOSURE, AND PRP;  Surgeon: Jr Flaco Yu MD;  Location: [facility];  Service: Cardiothoracic;  Laterality: N/A;   • CYST REMOVAL Right     ANKLE   • ENDOSCOPY     • PROSTATE BIOPSY      dx 2007 cancer no surgery or radiation, MULTIPLE BIOPSIES   • TONSILLECTOMY         Social History     Socioeconomic History   • Marital status: Single   Tobacco Use   • Smoking status: Never Smoker   • Smokeless tobacco: Never Used   Substance and Sexual Activity   • Alcohol use: Not Currently     Comment: 3-4 DRINKS/WEEK   • Drug use: No   • Sexual activity: Defer       Family History   Problem Relation Age of Onset   • Malig Hyperthermia Neg Hx        Review of Systems   Constitutional: Negative.   Cardiovascular: Negative.  Negative for chest pain, dyspnea on exertion, leg swelling, orthopnea, paroxysmal nocturnal dyspnea and syncope.   Respiratory: Negative.    Hematologic/Lymphatic: Negative for bleeding problem.   Musculoskeletal: Negative for falls.   Gastrointestinal: Negative for melena.   Neurological: Positive for light-headedness and loss of balance. Negative for dizziness.       Allergies   Allergen Reactions   • Avelox [Moxifloxacin Hcl] Other (See Comments)     Joint discomfort   • Linzess [Linaclotide] Diarrhea   • Rosuvastatin Unknown - Low Severity   • Statins Myalgia   • Xarelto [Rivaroxaban] Myalgia   • Eliquis [Apixaban] Rash         Current Outpatient Medications:   •  acetaminophen (TYLENOL) 325 MG tablet, Take 2 tablets by mouth Every 4 (Four) Hours As Needed for Mild Pain ., Disp: , Rfl:   •  aspirin 81 MG EC tablet, Take 81 mg by mouth Daily. INSTRUCTED PT TO FOLLOW MD INSTRUCTIONS REGARDING  "SURGERY, Disp: , Rfl:   •  ferrous gluconate (FERGON) 324 MG tablet, Take 1 tablet by mouth Daily With Breakfast., Disp: , Rfl:   •  furosemide (LASIX) 40 MG tablet, Take 1 tablet by mouth Daily., Disp: 30 tablet, Rfl: 11  •  ipratropium (ATROVENT) 0.03 % nasal spray, 2 sprays into the nostril(s) as directed by provider Every 12 (Twelve) Hours., Disp: , Rfl:   •  levothyroxine (SYNTHROID, LEVOTHROID) 50 MCG tablet, Take 50 mcg by mouth Daily., Disp: , Rfl:   •  losartan (COZAAR) 25 MG tablet, Take 1 tablet by mouth Daily., Disp: 90 tablet, Rfl: 3  •  LYCOPENE PO, Take 80 mg by mouth Daily. HOLD PRIOR TO SURG, Disp: , Rfl:   •  metoprolol succinate XL (TOPROL-XL) 50 MG 24 hr tablet, Take 1 tablet by mouth Daily for 90 days., Disp: 90 tablet, Rfl: 2  •  Multiple Vitamin (MULTI-VITAMIN DAILY PO), Take 1 tablet by mouth Daily. HOLD PRIOR TO SURG, Disp: , Rfl:   •  pantoprazole (PROTONIX) 40 MG EC tablet, Take 40 mg by mouth Every Night., Disp: , Rfl:   •  Probiotic Product (PROBIOTIC ADVANCED PO), Take 2 capsules by mouth Daily., Disp: , Rfl:   •  vitamin B-12 (CYANOCOBALAMIN) 1000 MCG tablet, Take 2,000 mcg by mouth Daily. HOLD PRIOR TO SURG, Disp: , Rfl:   •  Zinc 50 MG capsule, Take  by mouth. HOLD PRIOR TO SURG, Disp: , Rfl:   •  pravastatin (PRAVACHOL) 10 MG tablet, Take 1 tablet by mouth Daily., Disp: 30 tablet, Rfl: 2    Current Facility-Administered Medications:   •  Docusate Sodium 50 mg, 50 mg, Oral, BID, Jovita Wise, CAROL      Objective:     Vitals:    12/16/21 1415   BP: 130/70   Pulse: 71   Weight: 104 kg (229 lb)   Height: 195.6 cm (77\")     Body mass index is 27.16 kg/m².    PHYSICAL EXAM:    Neck:      Vascular: No JVD.   Pulmonary:      Effort: Pulmonary effort is normal.      Breath sounds: Normal breath sounds.   Cardiovascular:      Normal rate. Regular rhythm.      Murmurs: There is no murmur.      No gallop. No click. No rub.   Pulses:     Intact distal pulses.           ECG 12 " Lead    Date/Time: 12/16/2021 2:27 PM  Performed by: Robyn Gunderson APRN  Authorized by: Robyn Gunderson APRN   Comparison: compared with previous ECG from 10/6/2021  Similar to previous ECG  Rhythm: sinus rhythm  Rhythm comments: sinus versus ectopic atrial rhythm  Rate: normal  Other findings: non-specific ST-T wave changes    Clinical impression: abnormal EKG  Comments: Indication: CAD              Assessment:       Diagnosis Plan   1. Coronary artery disease of native artery of native heart with stable angina pectoris (HCC)  ECG 12 Lead    pravastatin (PRAVACHOL) 10 MG tablet   2. S/P CABG (coronary artery bypass graft)     3. Persistent atrial fibrillation (HCC)     4. S/P Maze operation for atrial fibrillation     5. Ischemic cardiomyopathy  Adult Transthoracic Echo Complete W/ Cont if Necessary Per Protocol    Basic Metabolic Panel   6. Lightheadedness  Holter Monitor - 72 Hour Up To 15 Days    Basic Metabolic Panel     Orders Placed This Encounter   Procedures   • Basic Metabolic Panel     Standing Status:   Future     Standing Expiration Date:   12/16/2022     Order Specific Question:   Release to patient     Answer:   Immediate   • Holter Monitor - 72 Hour Up To 15 Days     Standing Status:   Future     Standing Expiration Date:   12/16/2022     Scheduling Instructions:      14 day zio     Order Specific Question:   Reason for exam?     Answer:   Dizziness   • ECG 12 Lead     This order was created via procedure documentation     Order Specific Question:   Release to patient     Answer:   Immediate   • Adult Transthoracic Echo Complete W/ Cont if Necessary Per Protocol     Standing Status:   Future     Standing Expiration Date:   12/16/2022     Order Specific Question:   Reason for exam?     Answer:   Heart Failure, Cardiomyopathy, or Sytemic or Pulmonary Hypertension          Plan:       1. Coronary artery disease.  Status post CABG x 5 in August of this year.  He is on aspirin. He has a  history of statin intolerance. He is now intolerant of Livalo. I think it would be reasonable to try pravastatin. However, if he cannot tolerate the pravastatin then I would recommend proceeding with a PCSK9 inhibitor.      2.   Atrial fibrillation.  Status post cryomaze and left atrial appendage closure at the time of his CABG.  He has continued to decline anticoagulation. He is in sinus rhythm today. He is rate controlled with metoprolol.      3.   Ischemic cardiomyopathy.  He is on guideline directed medical therapy including Toprol and losartan.      4.   Lightheadedness. Not entirely sure what to make of his lightheadedness and balance issues. He did report this even prior to his CABG. I mentioned at the time that he would eventually need a Holter. Therefore, I am going to order a ZIO for further evaluation. I will also check a BMP just to ensure kidney function and electrolytes are stable.      I think he is stable. He denies any symptoms of angina or heart failure. Further recommendations will be made pending the results of the above.      As always, it has been a pleasure to participate in your patient's care.      Sincerely,         CAROL Braun

## 2021-12-17 ENCOUNTER — LAB (OUTPATIENT)
Dept: LAB | Facility: HOSPITAL | Age: 76
End: 2021-12-17

## 2021-12-17 ENCOUNTER — TELEPHONE (OUTPATIENT)
Dept: CARDIOLOGY | Facility: CLINIC | Age: 76
End: 2021-12-17

## 2021-12-17 ENCOUNTER — TREATMENT (OUTPATIENT)
Dept: CARDIAC REHAB | Facility: HOSPITAL | Age: 76
End: 2021-12-17

## 2021-12-17 DIAGNOSIS — R42 LIGHTHEADEDNESS: ICD-10-CM

## 2021-12-17 DIAGNOSIS — Z95.1 S/P CABG (CORONARY ARTERY BYPASS GRAFT): Primary | ICD-10-CM

## 2021-12-17 DIAGNOSIS — I25.5 ISCHEMIC CARDIOMYOPATHY: ICD-10-CM

## 2021-12-17 LAB
ANION GAP SERPL CALCULATED.3IONS-SCNC: 11.1 MMOL/L (ref 5–15)
BUN SERPL-MCNC: 36 MG/DL (ref 8–23)
BUN/CREAT SERPL: 20.6 (ref 7–25)
CALCIUM SPEC-SCNC: 9.4 MG/DL (ref 8.6–10.5)
CHLORIDE SERPL-SCNC: 102 MMOL/L (ref 98–107)
CO2 SERPL-SCNC: 25.9 MMOL/L (ref 22–29)
CREAT SERPL-MCNC: 1.75 MG/DL (ref 0.76–1.27)
GFR SERPL CREATININE-BSD FRML MDRD: 38 ML/MIN/1.73
GLUCOSE SERPL-MCNC: 131 MG/DL (ref 65–99)
POTASSIUM SERPL-SCNC: 4.5 MMOL/L (ref 3.5–5.2)
SODIUM SERPL-SCNC: 139 MMOL/L (ref 136–145)

## 2021-12-17 PROCEDURE — 36415 COLL VENOUS BLD VENIPUNCTURE: CPT

## 2021-12-17 PROCEDURE — 93798 PHYS/QHP OP CAR RHAB W/ECG: CPT

## 2021-12-17 PROCEDURE — 80048 BASIC METABOLIC PNL TOTAL CA: CPT

## 2021-12-17 NOTE — TELEPHONE ENCOUNTER
I left him a voicemail regarding his BMP.  His kidney function is slightly worse.  I recommended following up with his nephrologist.    Please fax a copy of these results to Dr. Enamorado ([facility])

## 2021-12-20 ENCOUNTER — TREATMENT (OUTPATIENT)
Dept: CARDIAC REHAB | Facility: HOSPITAL | Age: 76
End: 2021-12-20

## 2021-12-20 DIAGNOSIS — Z95.1 S/P CABG (CORONARY ARTERY BYPASS GRAFT): Primary | ICD-10-CM

## 2021-12-20 PROCEDURE — 93798 PHYS/QHP OP CAR RHAB W/ECG: CPT

## 2021-12-22 ENCOUNTER — APPOINTMENT (OUTPATIENT)
Dept: CARDIAC REHAB | Facility: HOSPITAL | Age: 76
End: 2021-12-22

## 2021-12-27 ENCOUNTER — TREATMENT (OUTPATIENT)
Dept: CARDIAC REHAB | Facility: HOSPITAL | Age: 76
End: 2021-12-27

## 2021-12-27 DIAGNOSIS — Z95.1 S/P CABG (CORONARY ARTERY BYPASS GRAFT): Primary | ICD-10-CM

## 2021-12-27 PROCEDURE — 93798 PHYS/QHP OP CAR RHAB W/ECG: CPT

## 2021-12-29 ENCOUNTER — TREATMENT (OUTPATIENT)
Dept: CARDIAC REHAB | Facility: HOSPITAL | Age: 76
End: 2021-12-29

## 2021-12-29 DIAGNOSIS — Z95.1 S/P CABG (CORONARY ARTERY BYPASS GRAFT): Primary | ICD-10-CM

## 2021-12-29 PROCEDURE — 93798 PHYS/QHP OP CAR RHAB W/ECG: CPT

## 2021-12-29 RX ORDER — METOPROLOL SUCCINATE 100 MG/1
TABLET, EXTENDED RELEASE ORAL
Qty: 30 TABLET | Refills: 2 | OUTPATIENT
Start: 2021-12-29

## 2022-01-03 ENCOUNTER — APPOINTMENT (OUTPATIENT)
Dept: CARDIAC REHAB | Facility: HOSPITAL | Age: 77
End: 2022-01-03

## 2022-01-05 ENCOUNTER — APPOINTMENT (OUTPATIENT)
Dept: CARDIAC REHAB | Facility: HOSPITAL | Age: 77
End: 2022-01-05

## 2022-01-05 ENCOUNTER — HOSPITAL ENCOUNTER (OUTPATIENT)
Dept: CARDIOLOGY | Facility: HOSPITAL | Age: 77
Discharge: HOME | End: 2022-01-05
Admitting: NURSE PRACTITIONER

## 2022-01-05 ENCOUNTER — TELEPHONE (OUTPATIENT)
Dept: CARDIOLOGY | Facility: CLINIC | Age: 77
End: 2022-01-05

## 2022-01-05 VITALS
HEIGHT: 77 IN | OXYGEN SATURATION: 95 % | BODY MASS INDEX: 27.04 KG/M2 | HEART RATE: 76 BPM | DIASTOLIC BLOOD PRESSURE: 60 MMHG | WEIGHT: 229 LBS | SYSTOLIC BLOOD PRESSURE: 120 MMHG

## 2022-01-05 DIAGNOSIS — I25.5 ISCHEMIC CARDIOMYOPATHY: ICD-10-CM

## 2022-01-05 PROCEDURE — 25010000002 PERFLUTREN (DEFINITY) 8.476 MG IN SODIUM CHLORIDE (PF) 0.9 % 10 ML INJECTION: Performed by: NURSE PRACTITIONER

## 2022-01-05 PROCEDURE — 93306 TTE W/DOPPLER COMPLETE: CPT | Performed by: INTERNAL MEDICINE

## 2022-01-05 PROCEDURE — 93306 TTE W/DOPPLER COMPLETE: CPT

## 2022-01-05 RX ADMIN — PERFLUTREN 1.5 ML: 6.52 INJECTION, SUSPENSION INTRAVENOUS at 12:53

## 2022-01-05 NOTE — TELEPHONE ENCOUNTER
I spoke with him regarding his echocardiogram results which were stable.    We are still awaiting the ZIO results.  I am not convinced his balance issues are cardiac.  He may need to see neurology.  Ultimately, I have recommended following up with his PCP.  In the meantime, he would like to decrease the dose of his metoprolol which I think is reasonable.

## 2022-01-06 LAB
ASCENDING AORTA: 3.1 CM
BH CV ECHO MEAS - ACS: 2.1 CM
BH CV ECHO MEAS - AI DEC SLOPE: 338.9 CM/SEC^2
BH CV ECHO MEAS - AI MAX PG: 65.2 MMHG
BH CV ECHO MEAS - AI MAX VEL: 403.9 CM/SEC
BH CV ECHO MEAS - AI P1/2T: 349 MSEC
BH CV ECHO MEAS - AO ARCH DIAM (PROXIMAL TRANS.): 3.3 CM
BH CV ECHO MEAS - AO MAX PG (FULL): 6.5 MMHG
BH CV ECHO MEAS - AO MAX PG: 8.6 MMHG
BH CV ECHO MEAS - AO MEAN PG (FULL): 3.8 MMHG
BH CV ECHO MEAS - AO MEAN PG: 5.1 MMHG
BH CV ECHO MEAS - AO ROOT AREA (BSA CORRECTED): 1.4
BH CV ECHO MEAS - AO ROOT AREA: 8.6 CM^2
BH CV ECHO MEAS - AO ROOT DIAM: 3.3 CM
BH CV ECHO MEAS - AO V2 MAX: 146.7 CM/SEC
BH CV ECHO MEAS - AO V2 MEAN: 107.3 CM/SEC
BH CV ECHO MEAS - AO V2 VTI: 32.3 CM
BH CV ECHO MEAS - ASC AORTA: 3 CM
BH CV ECHO MEAS - AVA(I,A): 1.9 CM^2
BH CV ECHO MEAS - AVA(I,D): 1.9 CM^2
BH CV ECHO MEAS - AVA(V,A): 2.1 CM^2
BH CV ECHO MEAS - AVA(V,D): 2.1 CM^2
BH CV ECHO MEAS - BSA(HAYCOCK): 2.4 M^2
BH CV ECHO MEAS - BSA: 2.4 M^2
BH CV ECHO MEAS - BZI_BMI: 27.2 KILOGRAMS/M^2
BH CV ECHO MEAS - BZI_METRIC_HEIGHT: 195.6 CM
BH CV ECHO MEAS - BZI_METRIC_WEIGHT: 103.9 KG
BH CV ECHO MEAS - EDV(MOD-SP2): 174 ML
BH CV ECHO MEAS - EDV(MOD-SP4): 176 ML
BH CV ECHO MEAS - EDV(TEICH): 140.5 ML
BH CV ECHO MEAS - EF(CUBED): 57.4 %
BH CV ECHO MEAS - EF(MOD-BP): 65 %
BH CV ECHO MEAS - EF(MOD-SP2): 59.2 %
BH CV ECHO MEAS - EF(MOD-SP4): 67.6 %
BH CV ECHO MEAS - EF(TEICH): 48.6 %
BH CV ECHO MEAS - ESV(MOD-SP2): 71 ML
BH CV ECHO MEAS - ESV(MOD-SP4): 57 ML
BH CV ECHO MEAS - ESV(TEICH): 72.2 ML
BH CV ECHO MEAS - FS: 24.7 %
BH CV ECHO MEAS - IVS/LVPW: 0.91
BH CV ECHO MEAS - IVSD: 1.1 CM
BH CV ECHO MEAS - LAT PEAK E' VEL: 6.5 CM/SEC
BH CV ECHO MEAS - LV DIASTOLIC VOL/BSA (35-75): 74.3 ML/M^2
BH CV ECHO MEAS - LV MASS(C)D: 238.4 GRAMS
BH CV ECHO MEAS - LV MASS(C)DI: 100.6 GRAMS/M^2
BH CV ECHO MEAS - LV MAX PG: 2.1 MMHG
BH CV ECHO MEAS - LV MEAN PG: 1.3 MMHG
BH CV ECHO MEAS - LV SYSTOLIC VOL/BSA (12-30): 24.1 ML/M^2
BH CV ECHO MEAS - LV V1 MAX: 72 CM/SEC
BH CV ECHO MEAS - LV V1 MEAN: 55.4 CM/SEC
BH CV ECHO MEAS - LV V1 VTI: 14.8 CM
BH CV ECHO MEAS - LVIDD: 5.4 CM
BH CV ECHO MEAS - LVIDS: 4.1 CM
BH CV ECHO MEAS - LVLD AP2: 9.1 CM
BH CV ECHO MEAS - LVLD AP4: 8.4 CM
BH CV ECHO MEAS - LVLS AP2: 7.9 CM
BH CV ECHO MEAS - LVLS AP4: 7.6 CM
BH CV ECHO MEAS - LVOT AREA (M): 4.2 CM^2
BH CV ECHO MEAS - LVOT AREA: 4.2 CM^2
BH CV ECHO MEAS - LVOT DIAM: 2.3 CM
BH CV ECHO MEAS - LVPWD: 1.2 CM
BH CV ECHO MEAS - MED PEAK E' VEL: 6.1 CM/SEC
BH CV ECHO MEAS - MV DEC SLOPE: 671.1 CM/SEC^2
BH CV ECHO MEAS - MV DEC TIME: 0.27 SEC
BH CV ECHO MEAS - MV E MAX VEL: 132 CM/SEC
BH CV ECHO MEAS - MV MAX PG: 10.2 MMHG
BH CV ECHO MEAS - MV MEAN PG: 5.3 MMHG
BH CV ECHO MEAS - MV P1/2T MAX VEL: 160 CM/SEC
BH CV ECHO MEAS - MV P1/2T: 69.8 MSEC
BH CV ECHO MEAS - MV V2 MAX: 160 CM/SEC
BH CV ECHO MEAS - MV V2 MEAN: 107 CM/SEC
BH CV ECHO MEAS - MV V2 VTI: 36.2 CM
BH CV ECHO MEAS - MVA P1/2T LCG: 1.4 CM^2
BH CV ECHO MEAS - MVA(P1/2T): 3.1 CM^2
BH CV ECHO MEAS - MVA(VTI): 1.7 CM^2
BH CV ECHO MEAS - PA ACC TIME: 0.06 SEC
BH CV ECHO MEAS - PA MAX PG (FULL): 0.07 MMHG
BH CV ECHO MEAS - PA MAX PG: 2.4 MMHG
BH CV ECHO MEAS - PA PR(ACCEL): 50.5 MMHG
BH CV ECHO MEAS - PA V2 MAX: 77.4 CM/SEC
BH CV ECHO MEAS - PULM A REVS DUR: 0.08 SEC
BH CV ECHO MEAS - PULM A REVS VEL: 26.1 CM/SEC
BH CV ECHO MEAS - PULM DIAS VEL: 75.9 CM/SEC
BH CV ECHO MEAS - PULM S/D: 1.1
BH CV ECHO MEAS - PULM SYS VEL: 83 CM/SEC
BH CV ECHO MEAS - PVA(V,A): 4.3 CM^2
BH CV ECHO MEAS - PVA(V,D): 4.3 CM^2
BH CV ECHO MEAS - QP/QS: 1.1
BH CV ECHO MEAS - RAP SYSTOLE: 3 MMHG
BH CV ECHO MEAS - RV MAX PG: 2.3 MMHG
BH CV ECHO MEAS - RV MEAN PG: 1.2 MMHG
BH CV ECHO MEAS - RV V1 MAX: 76.3 CM/SEC
BH CV ECHO MEAS - RV V1 MEAN: 49 CM/SEC
BH CV ECHO MEAS - RV V1 VTI: 15.9 CM
BH CV ECHO MEAS - RVOT AREA: 4.3 CM^2
BH CV ECHO MEAS - RVOT DIAM: 2.4 CM
BH CV ECHO MEAS - RVSP: 39.2 MMHG
BH CV ECHO MEAS - SI(AO): 116.9 ML/M^2
BH CV ECHO MEAS - SI(CUBED): 37.8 ML/M^2
BH CV ECHO MEAS - SI(LVOT): 26.2 ML/M^2
BH CV ECHO MEAS - SI(MOD-SP2): 43.5 ML/M^2
BH CV ECHO MEAS - SI(MOD-SP4): 50.2 ML/M^2
BH CV ECHO MEAS - SI(TEICH): 28.8 ML/M^2
BH CV ECHO MEAS - SV(AO): 277 ML
BH CV ECHO MEAS - SV(CUBED): 89.6 ML
BH CV ECHO MEAS - SV(LVOT): 62 ML
BH CV ECHO MEAS - SV(MOD-SP2): 103 ML
BH CV ECHO MEAS - SV(MOD-SP4): 119 ML
BH CV ECHO MEAS - SV(RVOT): 69.1 ML
BH CV ECHO MEAS - SV(TEICH): 68.2 ML
BH CV ECHO MEAS - TAPSE (>1.6): 1.7 CM
BH CV ECHO MEAS - TR MAX VEL: 301 CM/SEC
BH CV ECHO MEASUREMENTS AVERAGE E/E' RATIO: 20.95
BH CV XLRA - RV BASE: 3.4 CM
BH CV XLRA - RV LENGTH: 9 CM
BH CV XLRA - RV MID: 4 CM
BH CV XLRA - TDI S': 7.4 CM/SEC
LEFT ATRIUM VOLUME INDEX: 42 ML/M2
LV EF 2D ECHO EST: 65 %
MAXIMAL PREDICTED HEART RATE: 142 BPM
SINUS: 3.1 CM
STJ: 3.1 CM
STRESS TARGET HR: 121 BPM

## 2022-01-07 ENCOUNTER — APPOINTMENT (OUTPATIENT)
Dept: CARDIAC REHAB | Facility: HOSPITAL | Age: 77
End: 2022-01-07

## 2022-01-10 ENCOUNTER — APPOINTMENT (OUTPATIENT)
Dept: CARDIAC REHAB | Facility: HOSPITAL | Age: 77
End: 2022-01-10

## 2022-01-12 ENCOUNTER — APPOINTMENT (OUTPATIENT)
Dept: CARDIAC REHAB | Facility: HOSPITAL | Age: 77
End: 2022-01-12

## 2022-01-14 ENCOUNTER — TELEPHONE (OUTPATIENT)
Dept: CARDIOLOGY | Facility: CLINIC | Age: 77
End: 2022-01-14

## 2022-01-14 ENCOUNTER — APPOINTMENT (OUTPATIENT)
Dept: CARDIAC REHAB | Facility: HOSPITAL | Age: 77
End: 2022-01-14

## 2022-01-14 DIAGNOSIS — R26.89 BALANCE PROBLEM: ICD-10-CM

## 2022-01-14 DIAGNOSIS — R42 LIGHTHEADEDNESS: Primary | ICD-10-CM

## 2022-01-14 NOTE — TELEPHONE ENCOUNTER
I spoke with him regarding his ZIO results which were unremarkable.  I think he should see neurology for his continued symptoms of lightheadedness and balance disturbance.  He has agreed.  However, he would like to see Dr. Aparicio first.

## 2022-01-17 ENCOUNTER — APPOINTMENT (OUTPATIENT)
Dept: CARDIAC REHAB | Facility: HOSPITAL | Age: 77
End: 2022-01-17

## 2022-01-19 ENCOUNTER — APPOINTMENT (OUTPATIENT)
Dept: CARDIAC REHAB | Facility: HOSPITAL | Age: 77
End: 2022-01-19

## 2022-01-21 ENCOUNTER — APPOINTMENT (OUTPATIENT)
Dept: CARDIAC REHAB | Facility: HOSPITAL | Age: 77
End: 2022-01-21

## 2022-03-09 DIAGNOSIS — I25.118 CORONARY ARTERY DISEASE OF NATIVE ARTERY OF NATIVE HEART WITH STABLE ANGINA PECTORIS: ICD-10-CM

## 2022-03-09 RX ORDER — PRAVASTATIN SODIUM 10 MG
10 TABLET ORAL DAILY
Qty: 30 TABLET | Refills: 2 | Status: SHIPPED | OUTPATIENT
Start: 2022-03-09 | End: 2022-11-18 | Stop reason: ALTCHOICE

## 2022-03-24 RX ORDER — METOPROLOL SUCCINATE 50 MG/1
50 TABLET, EXTENDED RELEASE ORAL
Qty: 90 TABLET | Refills: 3 | Status: SHIPPED | OUTPATIENT
Start: 2022-03-24

## 2022-06-04 ENCOUNTER — TELEPHONE ENCOUNTER (OUTPATIENT)
Dept: URBAN - METROPOLITAN AREA CLINIC 68 | Facility: CLINIC | Age: 77
End: 2022-06-04

## 2022-06-05 ENCOUNTER — TELEPHONE ENCOUNTER (OUTPATIENT)
Dept: URBAN - METROPOLITAN AREA CLINIC 68 | Facility: CLINIC | Age: 77
End: 2022-06-05

## 2022-06-25 ENCOUNTER — TELEPHONE ENCOUNTER (OUTPATIENT)
Age: 77
End: 2022-06-25

## 2022-06-26 ENCOUNTER — TELEPHONE ENCOUNTER (OUTPATIENT)
Age: 77
End: 2022-06-26

## 2022-11-07 RX ORDER — LOSARTAN POTASSIUM 25 MG/1
25 TABLET ORAL DAILY
Qty: 90 TABLET | Refills: 3 | Status: SHIPPED | OUTPATIENT
Start: 2022-11-07

## 2022-11-18 ENCOUNTER — OFFICE VISIT (OUTPATIENT)
Dept: CARDIOLOGY | Facility: CLINIC | Age: 77
End: 2022-11-18

## 2022-11-18 VITALS
BODY MASS INDEX: 25.33 KG/M2 | DIASTOLIC BLOOD PRESSURE: 68 MMHG | SYSTOLIC BLOOD PRESSURE: 144 MMHG | WEIGHT: 208 LBS | HEART RATE: 62 BPM | HEIGHT: 76 IN

## 2022-11-18 DIAGNOSIS — Z98.890 S/P MAZE OPERATION FOR ATRIAL FIBRILLATION: ICD-10-CM

## 2022-11-18 DIAGNOSIS — I25.118 CORONARY ARTERY DISEASE OF NATIVE ARTERY OF NATIVE HEART WITH STABLE ANGINA PECTORIS: Primary | ICD-10-CM

## 2022-11-18 DIAGNOSIS — I42.0 DILATED CARDIOMYOPATHY: ICD-10-CM

## 2022-11-18 DIAGNOSIS — I48.21 PERMANENT ATRIAL FIBRILLATION: ICD-10-CM

## 2022-11-18 DIAGNOSIS — Z86.79 S/P MAZE OPERATION FOR ATRIAL FIBRILLATION: ICD-10-CM

## 2022-11-18 DIAGNOSIS — Z95.1 S/P CABG (CORONARY ARTERY BYPASS GRAFT): ICD-10-CM

## 2022-11-18 DIAGNOSIS — Z98.890 H/O MITRAL VALVE REPAIR: ICD-10-CM

## 2022-11-18 PROBLEM — I48.0 PAROXYSMAL ATRIAL FIBRILLATION (HCC): Status: ACTIVE | Noted: 2019-08-23

## 2022-11-18 PROCEDURE — 93000 ELECTROCARDIOGRAM COMPLETE: CPT | Performed by: NURSE PRACTITIONER

## 2022-11-18 PROCEDURE — 99215 OFFICE O/P EST HI 40 MIN: CPT | Performed by: NURSE PRACTITIONER

## 2022-11-18 RX ORDER — EVOLOCUMAB 140 MG/ML
INJECTION, SOLUTION SUBCUTANEOUS
COMMUNITY
Start: 2022-11-04

## 2022-11-18 NOTE — PROGRESS NOTES
Date of Office Visit: 2022  Encounter Provider: CAROL Gaona  Place of Service: [facility] CARDIOLOGY  Patient Name: Lucas Maynard  :1945    Chief Complaint   Patient presents with   • Atrial Fibrillation   :     HPI: Lucas Maynard is a 77 y.o. male.  He is a patient of Dr. Kelsey's whom we follow for the management of atrial fibrillation, coronary artery disease, and mitral regurgitation.  He has tried anticoagulation; however, due to nuance bleeding it was ultimately discontinued.  He also follows with a cardiologist in [place].     In 2021, he presented with dyspnea and left upper chest discomfort on exertion. He underwent a stress test and echocardiogram. The echocardiogram demonstrated mildly decreased LV function with an EF of 43%, moderate MR, and mild pulmonary hypertension. The stress test was negative for ischemia. However, he continued having symptoms and was referred for cardiac catheterization. Ultimately, he underwent a CABG x 5, cryomaze, and left atrial appendage closure. Due to acute kidney injury, renal was consulted for management.  Fortunately, his renal function returned to baseline by discharge.     I last saw him in the office in 2021 at which time he had several concerns including nighttime chills, lightheadedness, and balance issues.  Additionally, the Livalo was no longer affordable.  I ordered a ZIO and an echocardiogram.  Echocardiogram demonstrated normal LV function, mild aortic regurgitation, mild mitral regurgitation, mild mitral stenosis, and mild tricuspid regurgitation with mild pulmonary hypertension.  The ZIO demonstrated normal sinus rhythm with occasional extra beats.  Ultimately, he was advised to see neurology.  He preferred to discuss with his PCP.   In , it appears he was hospitalized in [place] with acute cholecystitis, sepsis, and ascending cholangitis.  He spent 3 days in ICU.  He was treated  with IV antibiotics.  He underwent a cholecystectomy tube placement.  Troponin and proBNP were elevated for which cardiology was consulted.  Echocardiogram demonstrated an EF of 30% and global hypokinesis.  It appears the opponent elevation was attributed to a type II non-STEMI.  Medical therapy was recommended.  He was discharged to rehab and eventually underwent a robotic cholecystectomy.   He was recently seen by his PCP at which time he reported dizziness for which the metoprolol dose was decreased.     Fortunately, he seems to have recovered nicely from his hospitalization in June.  He has followed up with his cardiologist in [place] and underwent a repeat echocardiogram in August demonstrating normalization of his LV function with an EF of 50 to 55%.  Additionally, it demonstrated mild aortic stenosis, a well-functioning mitral valve ring with trace mitral regurgitation, mild to moderate tricuspid regurgitation, and moderate pulmonary hypertension.  He denies any chest pain or shortness of breath and denied any during his hospitalization either.  He reports occasional palpitations.  He does feel a lot better since reducing the dose of metoprolol as far as his balance is concerned.  Of note, he discussed the neurology referral with his PCP and feels there is no indication to see a neurologist.  He had stopped Repatha for a period of time but is back on it as of a week ago.  He denies any edema or syncope.    Past Medical History:   Diagnosis Date   • Atrial fibrillation (HCC)    • CAD (coronary artery disease)    • Cancer (HCC) 2007    prostate biopsy only no radiation   • Cardiomyopathy (HCC)    • Carotid stenosis    • DDD (degenerative disc disease), lumbar    • Disease of thyroid gland    • GERD (gastroesophageal reflux disease)    • Hyperlipidemia    • Mitral valve stenosis    • Murmur, heart    • SOB (shortness of breath)        Past Surgical History:   Procedure Laterality Date   • CARDIAC CATHETERIZATION  N/A 8/30/2021    Procedure: Coronary angiography;  Surgeon: Jean Pierre Kelsey MD;  Location: [facility] CATH INVASIVE LOCATION;  Service: Cardiovascular;  Laterality: N/A;   • CARDIAC CATHETERIZATION N/A 8/30/2021    Procedure: Left Heart Cath;  Surgeon: Jean Pierre Kelsey MD;  Location: [facility] CATH INVASIVE LOCATION;  Service: Cardiovascular;  Laterality: N/A;   • CARDIAC CATHETERIZATION N/A 8/30/2021    Procedure: Left ventriculography;  Surgeon: Jean Pierre Kelsey MD;  Location: [facility] CATH INVASIVE LOCATION;  Service: Cardiovascular;  Laterality: N/A;   • CAROTID ENDARTERECTOMY Right 12/20/2018    Procedure: RIGHT CAROTID ENDARTERECTOMY;  Surgeon: Javier Acevedo MD;  Location: [facility] MAIN OR;  Service: Vascular   • CAROTID ENDARTERECTOMY Left 7/3/2019    Procedure: left CAROTID ENDARTERECTOMY;  Surgeon: Javier Acevedo MD;  Location: [facility] MAIN OR;  Service: Vascular   • CATARACT EXTRACTION, BILATERAL  2020    WITH LENS    • COLONOSCOPY N/A 7/27/2016    Procedure: COLONOSCOPY to terminal ileum with polypectomy;  Surgeon: Hi Quintanilla MD;  Location: [facility] ENDOSCOPY;  Service:    • CORONARY ARTERY BYPASS GRAFT WITH MITRAL VALVE REPAIR/REPLACEMENT N/A 9/14/2021    Procedure: INTRAOP GIRMA, CORONARY ARTERY BYPASS GRAFTING X 5 UTILIZING LEFT SANTOS AND ENDOSCOPICALLY HARVESTED RIGHT SAPHENOUS VEIN, MITRAL VALVE REPAIR/REPLACEMENT, MAZE PROCEDURE,  ATRIAL APPENDAGE CLOSURE, AND PRP;  Surgeon: Jr Flaco Yu MD;  Location: [facility] CVOR;  Service: Cardiothoracic;  Laterality: N/A;   • CYST REMOVAL Right     ANKLE   • ENDOSCOPY     • PROSTATE BIOPSY      dx 2007 cancer no surgery or radiation, MULTIPLE BIOPSIES   • TONSILLECTOMY         Social History     Socioeconomic History   • Marital status: Single   Tobacco Use   • Smoking status: Never   • Smokeless tobacco: Never   Substance and Sexual Activity   • Alcohol use: Not Currently     Comment: 3-4 DRINKS/WEEK   • Drug use: No   • Sexual activity: Defer        Family History   Problem Relation Age of Onset   • Malig Hyperthermia Neg Hx        Review of Systems   Constitutional: Negative.   Cardiovascular: Positive for palpitations. Negative for chest pain, dyspnea on exertion, leg swelling, orthopnea, paroxysmal nocturnal dyspnea and syncope.   Respiratory: Negative.    Hematologic/Lymphatic: Negative for bleeding problem.   Musculoskeletal: Negative for falls.   Gastrointestinal: Negative for melena.   Neurological: Positive for dizziness and loss of balance. Negative for light-headedness.       Allergies   Allergen Reactions   • Avelox [Moxifloxacin Hcl] Other (See Comments)     Joint discomfort   • Linzess [Linaclotide] Diarrhea   • Rosuvastatin Unknown - Low Severity   • Statins Myalgia   • Xarelto [Rivaroxaban] Myalgia   • Eliquis [Apixaban] Rash         Current Outpatient Medications:   •  acetaminophen (TYLENOL) 325 MG tablet, Take 2 tablets by mouth Every 4 (Four) Hours As Needed for Mild Pain ., Disp: , Rfl:   •  aspirin 81 MG EC tablet, Take 81 mg by mouth Daily. INSTRUCTED PT TO FOLLOW MD INSTRUCTIONS REGARDING SURGERY, Disp: , Rfl:   •  Evolocumab (Repatha SureClick) solution auto-injector SureClick injection, Repatha SureClick 140 mg/mL subcutaneous pen injector  INJECT 1 ML UNDER THE SKIN EVERY 2 WEEKS FOR 90 DAYS, Disp: , Rfl:   •  ipratropium (ATROVENT) 0.03 % nasal spray, 2 sprays into the nostril(s) as directed by provider Every 12 (Twelve) Hours., Disp: , Rfl:   •  levothyroxine (SYNTHROID, LEVOTHROID) 50 MCG tablet, Take 75 mcg by mouth Daily., Disp: , Rfl:   •  losartan (COZAAR) 25 MG tablet, TAKE 1 TABLET BY MOUTH DAILY, Disp: 90 tablet, Rfl: 3  •  LYCOPENE PO, Take 80 mg by mouth Daily. HOLD PRIOR TO SURG, Disp: , Rfl:   •  metoprolol succinate XL (TOPROL-XL) 50 MG 24 hr tablet, Take 1 tablet by mouth Daily. (Patient taking differently: Take 12.5 mg by mouth Daily.), Disp: 90 tablet, Rfl: 3  •  Multiple Vitamin (MULTI-VITAMIN DAILY PO), Take 1  "tablet by mouth Daily. HOLD PRIOR TO SURG, Disp: , Rfl:   •  pantoprazole (PROTONIX) 40 MG EC tablet, Take 40 mg by mouth Every Night., Disp: , Rfl:   •  Probiotic Product (PROBIOTIC ADVANCED PO), Take 2 capsules by mouth Daily., Disp: , Rfl:   •  vitamin B-12 (CYANOCOBALAMIN) 1000 MCG tablet, Take 2,000 mcg by mouth Daily. HOLD PRIOR TO SURG, Disp: , Rfl:   •  Zinc 50 MG capsule, Take  by mouth. HOLD PRIOR TO SURG, Disp: , Rfl:     Current Facility-Administered Medications:   •  Docusate Sodium 50 mg, 50 mg, Oral, BID, Jovita Wise APRN      Objective:     Vitals:    11/18/22 1327   BP: 144/68   Pulse: 62   Weight: 94.3 kg (208 lb)   Height: 193 cm (76\")     Body mass index is 25.32 kg/m².    PHYSICAL EXAM:    Neck:      Vascular: No JVD.   Pulmonary:      Effort: Pulmonary effort is normal.      Breath sounds: Normal breath sounds.   Cardiovascular:      Normal rate. Irregular rhythm.      Murmurs: There is no murmur.      No gallop. No click. No rub.   Pulses:     Intact distal pulses.           ECG 12 Lead    Date/Time: 11/18/2022 1:56 PM  Performed by: Robyn Gunderson APRN  Authorized by: Robyn Gunderson APRN   Comparison: compared with previous ECG   Rhythm: atrial fibrillation  Rate: normal  BPM: 65                Assessment:       Diagnosis Plan   1. Coronary artery disease of native artery of native heart with stable angina pectoris (HCC)        2. S/P CABG (coronary artery bypass graft)        3. H/O mitral valve repair        4. Dilated cardiomyopathy (HCC)        5. Permanent atrial fibrillation (HCC)  ECG 12 Lead      6. S/P Maze operation for atrial fibrillation          Orders Placed This Encounter   Procedures   • ECG 12 Lead     This order was created via procedure documentation     Order Specific Question:   Release to patient     Answer:   Routine Release          Plan:       1.  Coronary artery disease.  Status post CABG x 5 in August 2021.  He denies any symptoms of angina.  " Continue aspirin and Repatha.  He is statin intolerant.      2.  Mitral regurgitation.  Status post mitral valve repair in August 2021.      3.  Cardiomyopathy.  EF 30% during the hospitalization in June in the setting of sepsis.  Fortunately, a repeat echocardiogram performed in August demonstrated normalization of his LV function with an EF of 50 to 55%.  I have the reports of this echocardiogram and will scanned them into the system.      4.  Atrial fibrillation.  Status post maze and left atrial appendage ligation in August 2021.  Historically he has not tolerated anticoagulation due to nuance bleeding.  We discussed this at length.  Ultimately, he will continue the aspirin alone.      Overall, I think he is stable.  I am not recommending any changes.  I recommended he follow-up with Dr. Kelsey in 3 months.  However, given that he will be in [place] for the winter, he will follow-up with him upon his return.      I spent 54 minutes in total including but not limited to the 22 minutes spent in direct conversation with this patient.       As always, it has been a pleasure to participate in your patient's care.      Sincerely,         CAROL Braun

## 2023-06-29 PROBLEM — I48.11 LONGSTANDING PERSISTENT ATRIAL FIBRILLATION: Status: ACTIVE | Noted: 2019-08-23

## 2023-11-02 RX ORDER — LOSARTAN POTASSIUM 25 MG/1
25 TABLET ORAL DAILY
Qty: 90 TABLET | Refills: 3 | Status: SHIPPED | OUTPATIENT
Start: 2023-11-02

## 2023-11-13 RX ORDER — FUROSEMIDE 20 MG/1
20 TABLET ORAL DAILY PRN
COMMUNITY

## 2023-11-14 ENCOUNTER — ANESTHESIA (OUTPATIENT)
Dept: GASTROENTEROLOGY | Facility: HOSPITAL | Age: 78
End: 2023-11-14
Payer: MEDICARE

## 2023-11-14 ENCOUNTER — ANESTHESIA EVENT (OUTPATIENT)
Dept: GASTROENTEROLOGY | Facility: HOSPITAL | Age: 78
End: 2023-11-14
Payer: MEDICARE

## 2023-11-14 ENCOUNTER — ON CAMPUS - OUTPATIENT (OUTPATIENT)
Dept: URBAN - METROPOLITAN AREA HOSPITAL 114 | Facility: HOSPITAL | Age: 78
End: 2023-11-14
Payer: COMMERCIAL

## 2023-11-14 ENCOUNTER — HOSPITAL ENCOUNTER (OUTPATIENT)
Facility: HOSPITAL | Age: 78
Setting detail: HOSPITAL OUTPATIENT SURGERY
Discharge: HOME | End: 2023-11-14
Attending: INTERNAL MEDICINE | Admitting: INTERNAL MEDICINE
Payer: MEDICARE

## 2023-11-14 VITALS
WEIGHT: 217 LBS | SYSTOLIC BLOOD PRESSURE: 149 MMHG | HEIGHT: 77 IN | RESPIRATION RATE: 17 BRPM | DIASTOLIC BLOOD PRESSURE: 85 MMHG | HEART RATE: 61 BPM | BODY MASS INDEX: 25.62 KG/M2 | OXYGEN SATURATION: 98 %

## 2023-11-14 DIAGNOSIS — K21.00 GASTRO-ESOPHAGEAL REFLUX DISEASE WITH ESOPHAGITIS, WITHOUT B: ICD-10-CM

## 2023-11-14 DIAGNOSIS — D12.3 BENIGN NEOPLASM OF TRANSVERSE COLON: ICD-10-CM

## 2023-11-14 DIAGNOSIS — K57.30 DIVERTICULOSIS OF LARGE INTESTINE WITHOUT PERFORATION OR ABS: ICD-10-CM

## 2023-11-14 DIAGNOSIS — D12.4 BENIGN NEOPLASM OF DESCENDING COLON: ICD-10-CM

## 2023-11-14 DIAGNOSIS — D50.9 IRON DEFICIENCY ANEMIA: ICD-10-CM

## 2023-11-14 DIAGNOSIS — K22.2 ESOPHAGEAL OBSTRUCTION: ICD-10-CM

## 2023-11-14 DIAGNOSIS — K31.811 ANGIODYSPLASIA OF STOMACH AND DUODENUM WITH BLEEDING: ICD-10-CM

## 2023-11-14 DIAGNOSIS — K64.1 SECOND DEGREE HEMORRHOIDS: ICD-10-CM

## 2023-11-14 DIAGNOSIS — D50.9 IRON DEFICIENCY ANEMIA, UNSPECIFIED: ICD-10-CM

## 2023-11-14 DIAGNOSIS — K21.9 GERD (GASTROESOPHAGEAL REFLUX DISEASE): ICD-10-CM

## 2023-11-14 PROCEDURE — 25810000003 LACTATED RINGERS PER 1000 ML: Performed by: INTERNAL MEDICINE

## 2023-11-14 PROCEDURE — 43239 EGD BIOPSY SINGLE/MULTIPLE: CPT | Performed by: INTERNAL MEDICINE

## 2023-11-14 PROCEDURE — 45385 COLONOSCOPY W/LESION REMOVAL: CPT | Performed by: INTERNAL MEDICINE

## 2023-11-14 PROCEDURE — 45380 COLONOSCOPY AND BIOPSY: CPT | Mod: 59 | Performed by: INTERNAL MEDICINE

## 2023-11-14 PROCEDURE — 43255 EGD CONTROL BLEEDING ANY: CPT | Mod: 59 | Performed by: INTERNAL MEDICINE

## 2023-11-14 PROCEDURE — 88305 TISSUE EXAM BY PATHOLOGIST: CPT | Performed by: INTERNAL MEDICINE

## 2023-11-14 PROCEDURE — 25010000002 PROPOFOL 10 MG/ML EMULSION: Performed by: ANESTHESIOLOGY

## 2023-11-14 RX ORDER — SODIUM CHLORIDE, SODIUM LACTATE, POTASSIUM CHLORIDE, CALCIUM CHLORIDE 600; 310; 30; 20 MG/100ML; MG/100ML; MG/100ML; MG/100ML
1000 INJECTION, SOLUTION INTRAVENOUS CONTINUOUS
Status: DISCONTINUED | OUTPATIENT
Start: 2023-11-14 | End: 2023-11-14 | Stop reason: HOSPADM

## 2023-11-14 RX ORDER — PROPOFOL 10 MG/ML
VIAL (ML) INTRAVENOUS AS NEEDED
Status: DISCONTINUED | OUTPATIENT
Start: 2023-11-14 | End: 2023-11-14 | Stop reason: SURG

## 2023-11-14 RX ORDER — SODIUM CHLORIDE 0.9 % (FLUSH) 0.9 %
10 SYRINGE (ML) INJECTION AS NEEDED
Status: DISCONTINUED | OUTPATIENT
Start: 2023-11-14 | End: 2023-11-14 | Stop reason: HOSPADM

## 2023-11-14 RX ORDER — LIDOCAINE HYDROCHLORIDE 20 MG/ML
INJECTION, SOLUTION INFILTRATION; PERINEURAL AS NEEDED
Status: DISCONTINUED | OUTPATIENT
Start: 2023-11-14 | End: 2023-11-14 | Stop reason: SURG

## 2023-11-14 RX ADMIN — LIDOCAINE HYDROCHLORIDE 60 MG: 20 INJECTION, SOLUTION INFILTRATION; PERINEURAL at 09:47

## 2023-11-14 RX ADMIN — PROPOFOL 120 MCG/KG/MIN: 10 INJECTION, EMULSION INTRAVENOUS at 09:49

## 2023-11-14 RX ADMIN — SODIUM CHLORIDE, POTASSIUM CHLORIDE, SODIUM LACTATE AND CALCIUM CHLORIDE 1000 ML: 600; 310; 30; 20 INJECTION, SOLUTION INTRAVENOUS at 09:38

## 2023-11-14 RX ADMIN — PROPOFOL 80 MG: 10 INJECTION, EMULSION INTRAVENOUS at 09:47

## 2023-11-14 NOTE — H&P
[facility]   HISTORY AND PHYSICAL    Patient Name: Lucas Maynard  : 1945  MRN: [id number]  Primary Care Physician:  Madison Aparicio MD  Date of admission: 2023    Subjective   Subjective     Chief Complaint: reflux, iron deficiency anemia     History of Present Illness  Patient feeling generally well.  Has had some health issues in past few years but luckily things improved   Review of Systems   All other systems reviewed and are negative.       Personal History     Past Medical History:   Diagnosis Date    Atrial fibrillation     CAD (coronary artery disease)     Cancer 2007    prostate biopsy only no radiation    Cardiomyopathy     Carotid stenosis     DDD (degenerative disc disease), lumbar     Disease of thyroid gland     GERD (gastroesophageal reflux disease)     Hyperlipidemia     Low hemoglobin     Mitral valve stenosis     Murmur, heart     SOB (shortness of breath)        Past Surgical History:   Procedure Laterality Date    CARDIAC CATHETERIZATION N/A 2021    Procedure: Coronary angiography;  Surgeon: Jean Pierre Kelsey MD;  Location: [facility] INVASIVE LOCATION;  Service: Cardiovascular;  Laterality: N/A;    CARDIAC CATHETERIZATION N/A 2021    Procedure: Left Heart Cath;  Surgeon: Jean Pierre Kelsey MD;  Location: [facility] INVASIVE LOCATION;  Service: Cardiovascular;  Laterality: N/A;    CARDIAC CATHETERIZATION N/A 2021    Procedure: Left ventriculography;  Surgeon: Jean Pierre Kelsey MD;  Location: [facility] CATH INVASIVE LOCATION;  Service: Cardiovascular;  Laterality: N/A;    CAROTID ENDARTERECTOMY Right 2018    Procedure: RIGHT CAROTID ENDARTERECTOMY;  Surgeon: Javier Acevedo MD;  Location: [facility] OR;  Service: Vascular    CAROTID ENDARTERECTOMY Left 2019    Procedure: left CAROTID ENDARTERECTOMY;  Surgeon: Javier Acevedo MD;  Location: [facility] OR;  Service: Vascular    CATARACT EXTRACTION, BILATERAL  2020    WITH LENS     COLONOSCOPY  N/A 07/27/2016    Procedure: COLONOSCOPY to terminal ileum with polypectomy;  Surgeon: Hi Quintanilla MD;  Location: [facility] ENDOSCOPY;  Service:     CORONARY ARTERY BYPASS GRAFT WITH MITRAL VALVE REPAIR/REPLACEMENT N/A 09/14/2021    Procedure: INTRAOP GIRMA, CORONARY ARTERY BYPASS GRAFTING X 5 UTILIZING LEFT SANTOS AND ENDOSCOPICALLY HARVESTED RIGHT SAPHENOUS VEIN, MITRAL VALVE REPAIR/REPLACEMENT, MAZE PROCEDURE,  ATRIAL APPENDAGE CLOSURE, AND PRP;  Surgeon: Jr Flaco Yu MD;  Location: [facility] CVOR;  Service: Cardiothoracic;  Laterality: N/A;    CYST REMOVAL Right     ANKLE    ENDOSCOPY      LAPAROSCOPIC CHOLECYSTECTOMY      PROSTATE BIOPSY      dx 2007 cancer no surgery or radiation, MULTIPLE BIOPSIES    TONSILLECTOMY         Family History: family history is not on file. Otherwise pertinent FHx was reviewed and not pertinent to current issue.    Social History:  reports that he has never smoked. He has never used smokeless tobacco. He reports that he does not currently use alcohol. He reports that he does not use drugs.    Home Medications:  Evolocumab, Lycopene, Probiotic Product, acetaminophen, aspirin, furosemide, ipratropium, levothyroxine, losartan, metoprolol succinate XL, multivitamin, pantoprazole, and vitamin B-12    Allergies:  Allergies   Allergen Reactions    Avelox [Moxifloxacin Hcl] Other (See Comments) and Myalgia     Joint discomfort    Eliquis [Apixaban] Rash    Linzess [Linaclotide] Diarrhea    Statins Myalgia    Xarelto [Rivaroxaban] Myalgia       Objective    Objective     Vitals:   Heart Rate:  [75] 75  Resp:  [18] 18  BP: (171)/(93) 171/93    Physical Exam  HENT:      Right Ear: External ear normal.      Left Ear: External ear normal.      Mouth/Throat:      Pharynx: Oropharynx is clear.   Eyes:      Conjunctiva/sclera: Conjunctivae normal.   Cardiovascular:      Pulses: Normal pulses.   Pulmonary:      Effort: Pulmonary effort is normal.   Abdominal:      General: Abdomen is flat.    Skin:     General: Skin is warm.   Neurological:      General: No focal deficit present.      Mental Status: He is alert.   Psychiatric:         Mood and Affect: Mood normal.         Result Review    Result Review:  I have personally reviewed the results from the time of this admission to 11/14/2023 09:45 EST and agree with these findings:  []  Laboratory list / accordion  []  Microbiology  []  Radiology  []  EKG/Telemetry   []  Cardiology/Vascular   []  Pathology  []  Old records  []  Other:  Most notable findings include:       Assessment & Plan   Assessment / Plan     Brief Patient Summary:  Lucas Maynard is a 78 y.o. male who   Gastroesophageal reflux disease  Fe def anemia     Active Hospital Problems:  There are no active hospital problems to display for this patient.    Plan: Upper and lower tract endoscopy, risks, alternatives and benefits discussed with patient and patient is agreeable to proceed.      DVT prophylaxis:  No DVT prophylaxis order currently exists.    CODE STATUS:       Admission Status:  I believe this patient meets outpatient  status.    Hi Quintanilla MD

## 2023-11-14 NOTE — ANESTHESIA POSTPROCEDURE EVALUATION
"Patient: Lucas Maynard    Procedure Summary       Date: 11/14/23 Room / Location:  [facility]    Anesthesia Start: 0945 Anesthesia Stop: 1039    Procedures:       ESOPHAGOGASTRODUODENOSCOPY WITH  BIOPSY AND APC (Esophagus)      COLONOSCOPY TO CECUM WITH POLYPECTOMY ( HOT SNARE/COLD BX) Diagnosis:     Surgeons: Hi Quintanilla MD Provider: Ruben Clemens MD    Anesthesia Type: MAC ASA Status: 4            Anesthesia Type: MAC    Vitals  Vitals Value Taken Time   /89 11/14/23 1058   Temp     Pulse 68 11/14/23 1105   Resp 17 11/14/23 1057   SpO2 99 % 11/14/23 1104   Vitals shown include unfiled device data.        Post Anesthesia Care and Evaluation    Level of consciousness: awake and alert  Pain management: adequate  Anesthetic complications: No anesthetic complications    Cardiovascular status: acceptable  Respiratory status: acceptable  Hydration status: acceptable    Comments: /85 (BP Location: Left arm, Patient Position: Sitting)   Pulse 61   Resp 17   Ht 195.6 cm (77\")   Wt 98.4 kg (217 lb)   SpO2 98%   BMI 25.73 kg/m²       "

## 2023-11-14 NOTE — ANESTHESIA PREPROCEDURE EVALUATION
Anesthesia Evaluation     Patient summary reviewed and Nursing notes reviewed   no history of anesthetic complications:   NPO Solid Status: > 8 hours  NPO Liquid Status: > 8 hours           Airway   Mallampati: II  Neck ROM: full  No difficulty expected  Comment: G1 DL Mac4/Mil2 previously  Dental - normal exam     Pulmonary    (+) ,shortness of breath  Cardiovascular     ECG reviewed  Rhythm: regular    (+) valvular problems/murmurs MR, CAD, dysrhythmias Paroxysmal Atrial Fib, angina, CHF Systolic <55%, hyperlipidemia,  carotid artery disease    ROS comment: TTE:  · Calculated EF = 43.7%  · Left ventricular systolic function is mildly decreased.  · Left ventricular wall thickness is consistent with mild concentric hypertrophy.  · The following left ventricular wall segments are hypokinetic: basal inferolateral and basal inferior.  · Left atrial cavity size is severely dilated.  · Mild aortic valve regurgitation is present.  · The mitral valve is abnormal in structure. There are myxomatous changes of the mitral valve apparatus present. Mild MAC is present. There is mitral valve prolapse of the posterior mitral leaflet.  · Moderate mitral valve regurgitation is present with an eccentric jet noted. It is difficult to quantitate the amount of mitral regurgitation due to the extreme eccentric jet  · Mild tricuspid valve regurgitation is present.  · Estimated right ventricular systolic pressure from tricuspid regurgitation is mildly elevated (35-45 mmHg).  · Calculated right ventricular systolic pressure from tricuspid regurgitation is 42.7 mmHg.    LHC:  Severe 3v CAD    Neuro/Psych  (+) syncope  GI/Hepatic/Renal/Endo    (+) GERD, renal disease-, thyroid problem hypothyroidism    Musculoskeletal     Abdominal    Substance History      OB/GYN          Other   arthritis,   history of cancer                      Anesthesia Plan    ASA 4     MAC     intravenous induction     Anesthetic plan, risks, benefits, and  alternatives have been provided, discussed and informed consent has been obtained with: patient.    Use of blood products discussed with patient  Consented to blood products.

## 2023-11-15 LAB
LAB AP CASE REPORT: NORMAL
PATH REPORT.FINAL DX SPEC: NORMAL
PATH REPORT.GROSS SPEC: NORMAL

## 2024-07-25 ENCOUNTER — OFFICE VISIT (OUTPATIENT)
Dept: CARDIOLOGY | Facility: CLINIC | Age: 79
End: 2024-07-25
Payer: MEDICARE

## 2024-07-25 VITALS
DIASTOLIC BLOOD PRESSURE: 76 MMHG | HEART RATE: 72 BPM | HEIGHT: 77 IN | WEIGHT: 217 LBS | BODY MASS INDEX: 25.62 KG/M2 | SYSTOLIC BLOOD PRESSURE: 132 MMHG

## 2024-07-25 DIAGNOSIS — I48.11 LONGSTANDING PERSISTENT ATRIAL FIBRILLATION: ICD-10-CM

## 2024-07-25 DIAGNOSIS — I25.10 CORONARY ARTERY DISEASE INVOLVING NATIVE CORONARY ARTERY OF NATIVE HEART WITHOUT ANGINA PECTORIS: Primary | ICD-10-CM

## 2024-07-25 DIAGNOSIS — Z98.890 H/O MITRAL VALVE REPAIR: ICD-10-CM

## 2024-07-25 DIAGNOSIS — Z95.1 S/P CABG (CORONARY ARTERY BYPASS GRAFT): ICD-10-CM

## 2024-07-25 DIAGNOSIS — I34.0 NON-RHEUMATIC MITRAL REGURGITATION: ICD-10-CM

## 2024-07-25 PROCEDURE — 1160F RVW MEDS BY RX/DR IN RCRD: CPT | Performed by: NURSE PRACTITIONER

## 2024-07-25 PROCEDURE — 1159F MED LIST DOCD IN RCRD: CPT | Performed by: NURSE PRACTITIONER

## 2024-07-25 PROCEDURE — 99214 OFFICE O/P EST MOD 30 MIN: CPT | Performed by: NURSE PRACTITIONER

## 2024-07-25 PROCEDURE — 93000 ELECTROCARDIOGRAM COMPLETE: CPT | Performed by: NURSE PRACTITIONER

## 2024-07-25 NOTE — PROGRESS NOTES
Date of Office Visit: 2024  Encounter Provider: CAROL Gaona  Place of Service: [facility] CARDIOLOGY  Patient Name: Lucas Maynard  :1945    Chief Complaint   Patient presents with    Coronary Artery Disease   :     HPI: Lucas Maynard is a 79 y.o. male patient of Dr. Kelsey's with atrial fibrillation, coronary artery disease, and mitral regurgitation.  Historically he has not tolerated anticoagulation due to nuance bleeding.  He also follows with a cardiologist in [place].    In 2021, he underwent a CABG x 5, cryo maze, and left atrial appendage closure.    In 2022, he was hospitalized in [place] with acute cholecystitis, sepsis, and ascending cholangitis and spent 3 days in the ICU.  During that hospitalization he had an elevated troponin which was attributed to a type II non-STEMI.  Ultimately, he underwent a robotic cholecystectomy.    I last saw him in the office in 2023 at which time he was doing well.  He was struggling with balance issues for which he was planning to see ENT.  No changes were made to his regimen, and he was advised to follow-up in 1 year.    Overall, he has been feeling well.  He had been struggling with lightheadedness and felt like he was constantly in a fog.  He ended up seeing his cardiologist in [place] who stopped his metoprolol.  He reports a significant improvement in his symptoms.  He denies any chest pain, shortness of breath, palpitations, edema, dizziness, or syncope.    Past Medical History:   Diagnosis Date    Atrial fibrillation     CAD (coronary artery disease)     Cancer 2007    prostate biopsy only no radiation    Cardiomyopathy     Carotid stenosis     DDD (degenerative disc disease), lumbar     Disease of thyroid gland     GERD (gastroesophageal reflux disease)     Hyperlipidemia     Low hemoglobin     Mitral valve stenosis     Murmur, heart     SOB (shortness of breath)        Past Surgical History:    Procedure Laterality Date    CARDIAC CATHETERIZATION N/A 08/30/2021    Procedure: Coronary angiography;  Surgeon: Jean Pierre Kelsey MD;  Location: [facility] CATH INVASIVE LOCATION;  Service: Cardiovascular;  Laterality: N/A;    CARDIAC CATHETERIZATION N/A 08/30/2021    Procedure: Left Heart Cath;  Surgeon: Jean Pierre Kelsey MD;  Location: [facility] CATH INVASIVE LOCATION;  Service: Cardiovascular;  Laterality: N/A;    CARDIAC CATHETERIZATION N/A 08/30/2021    Procedure: Left ventriculography;  Surgeon: Jean Pierre Kelsey MD;  Location: [facility] CATH INVASIVE LOCATION;  Service: Cardiovascular;  Laterality: N/A;    CAROTID ENDARTERECTOMY Right 12/20/2018    Procedure: RIGHT CAROTID ENDARTERECTOMY;  Surgeon: Javier Acevedo MD;  Location: [facility] MAIN OR;  Service: Vascular    CAROTID ENDARTERECTOMY Left 07/03/2019    Procedure: left CAROTID ENDARTERECTOMY;  Surgeon: Javier Acevedo MD;  Location: [facility] MAIN OR;  Service: Vascular    CATARACT EXTRACTION, BILATERAL  2020    WITH LENS     COLONOSCOPY N/A 07/27/2016    Procedure: COLONOSCOPY to terminal ileum with polypectomy;  Surgeon: Hi Quintanilla MD;  Location: [facility] ENDOSCOPY;  Service:     COLONOSCOPY N/A 11/14/2023    Procedure: COLONOSCOPY TO CECUM WITH POLYPECTOMY ( HOT SNARE/COLD BX);  Surgeon: Hi Quintanilla MD;  Location: [facility] ENDOSCOPY;  Service: Gastroenterology;  Laterality: N/A;  IRON DEFICIENCY ANEMIA  POST: DIVERTICULOSIS; SUBOPTIMAL PREP; COLON POLYPS; HEMORRHOIDS    CORONARY ARTERY BYPASS GRAFT WITH MITRAL VALVE REPAIR/REPLACEMENT N/A 09/14/2021    Procedure: INTRAOP GIRMA, CORONARY ARTERY BYPASS GRAFTING X 5 UTILIZING LEFT SANTOS AND ENDOSCOPICALLY HARVESTED RIGHT SAPHENOUS VEIN, MITRAL VALVE REPAIR/REPLACEMENT, MAZE PROCEDURE,  ATRIAL APPENDAGE CLOSURE, AND PRP;  Surgeon: Jr Flaco Yu MD;  Location: [facility] CVOR;  Service: Cardiothoracic;  Laterality: N/A;    CYST REMOVAL Right     ANKLE    ENDOSCOPY      ENDOSCOPY N/A 11/14/2023     Procedure: ESOPHAGOGASTRODUODENOSCOPY WITH  BIOPSY AND APC;  Surgeon: Hi Quintanilla MD;  Location: [facility] ENDOSCOPY;  Service: Gastroenterology;  Laterality: N/A;  IRON DEFICIENCY ANEMIA; GERD  POST: MILD ESOPHAGITIS; HIATAL HERNIA;GAVE    LAPAROSCOPIC CHOLECYSTECTOMY      PROSTATE BIOPSY      dx 2007 cancer no surgery or radiation, MULTIPLE BIOPSIES    TONSILLECTOMY         Social History     Socioeconomic History    Marital status: Single   Tobacco Use    Smoking status: Never    Smokeless tobacco: Never   Vaping Use    Vaping status: Never Used   Substance and Sexual Activity    Alcohol use: Not Currently     Comment: RARE    Drug use: No    Sexual activity: Defer       Family History   Problem Relation Age of Onset    Malig Hyperthermia Neg Hx        Review of Systems   Constitutional: Negative.   Cardiovascular: Negative.  Negative for chest pain, dyspnea on exertion, leg swelling, orthopnea, paroxysmal nocturnal dyspnea and syncope.   Respiratory: Negative.     Hematologic/Lymphatic: Negative for bleeding problem.   Musculoskeletal:  Negative for falls.   Gastrointestinal:  Negative for melena.   Neurological:  Negative for dizziness and light-headedness.       Allergies   Allergen Reactions    Avelox [Moxifloxacin Hcl] Other (See Comments) and Myalgia     Joint discomfort    Eliquis [Apixaban] Rash    Linzess [Linaclotide] Diarrhea    Statins Myalgia    Xarelto [Rivaroxaban] Myalgia         Current Outpatient Medications:     acetaminophen (TYLENOL) 325 MG tablet, Take 2 tablets by mouth Every 4 (Four) Hours As Needed for Mild Pain ., Disp: , Rfl:     aspirin 81 MG EC tablet, Take 1 tablet by mouth Daily. INSTRUCTED PT TO FOLLOW MD INSTRUCTIONS REGARDING SURGERY, Disp: , Rfl:     Evolocumab (Repatha SureClick) solution auto-injector SureClick injection, Repatha SureClick 140 mg/mL subcutaneous pen injector  INJECT 1 ML UNDER THE SKIN EVERY 2 WEEKS FOR 90 DAYS, Disp: , Rfl:     furosemide (LASIX) 20 MG  "tablet, Take 1 tablet by mouth Daily As Needed., Disp: , Rfl:     ipratropium (ATROVENT) 0.03 % nasal spray, 2 sprays into the nostril(s) as directed by provider As Needed., Disp: , Rfl:     levothyroxine (SYNTHROID, LEVOTHROID) 88 MCG tablet, Take 1 tablet by mouth Daily., Disp: , Rfl:     losartan (COZAAR) 25 MG tablet, TAKE 1 TABLET BY MOUTH DAILY (Patient taking differently: Take 1 tablet by mouth 2 (Two) Times a Day.), Disp: 90 tablet, Rfl: 3    LYCOPENE PO, Take 80 mg by mouth Daily. HOLD PRIOR TO SURG, Disp: , Rfl:     Multiple Vitamin (MULTI-VITAMIN DAILY PO), Take 1 tablet by mouth Daily. HOLD PRIOR TO SURG, Disp: , Rfl:     pantoprazole (PROTONIX) 40 MG EC tablet, Take 1 tablet by mouth As Needed., Disp: , Rfl:     Probiotic Product (PROBIOTIC ADVANCED PO), Take 2 capsules by mouth Daily., Disp: , Rfl:     vitamin B-12 (CYANOCOBALAMIN) 1000 MCG tablet, Take 2 tablets by mouth Daily. HOLD PRIOR TO SURG, Disp: , Rfl:       Objective:     Vitals:    07/25/24 1322   BP: 132/76   Pulse: 72   Weight: 98.4 kg (217 lb)   Height: 195.6 cm (77\")     Body mass index is 25.73 kg/m².    PHYSICAL EXAM:    Neck:      Vascular: No JVD.   Pulmonary:      Effort: Pulmonary effort is normal.      Breath sounds: Normal breath sounds.   Cardiovascular:      Normal rate. Irregular rhythm.      Murmurs: There is no murmur.      No gallop.  No click. No rub.   Pulses:     Intact distal pulses.           ECG 12 Lead    Date/Time: 7/25/2024 1:30 PM  Performed by: Robyn Gunderson APRN    Authorized by: Robyn Gunderson APRN  Comparison: compared with previous ECG from 6/29/2023  Similar to previous ECG  Rhythm: atrial fibrillation  Rate: normal  BPM: 72            Assessment:       Diagnosis Plan   1. Coronary artery disease involving native coronary artery of native heart without angina pectoris  ECG 12 Lead      2. S/P CABG (coronary artery bypass graft)        3. Longstanding persistent atrial fibrillation        4. " Non-rheumatic mitral regurgitation        5. H/O mitral valve repair          Orders Placed This Encounter   Procedures    ECG 12 Lead     This order was created via procedure documentation     Order Specific Question:   Release to patient     Answer:   Routine Release [[id number]]          Plan:       1.  Coronary artery disease.  Status post CABG in 2021.  He denies any symptoms of angina.  Continue aspirin.  He is statin intolerant.      2.  Persistent atrial fibrillation.  Status post maze and left atrial appendage ligation in August 2021.  He declines anticoagulation.      3.  Mitral regurgitation.  Status post MV repair.  Echocardiogram from 2022 demonstrated normal LV function and a repaired mitral valve with mild MR and mild mitral stenosis.      I think he is doing well.  I am not making any changes today, and he will follow-up with Dr. Kelsey in 1 year.      As always, it has been a pleasure to participate in your patient's care.      Sincerely,         CAROL Braun

## 2025-02-10 ENCOUNTER — NEW PATIENT (OUTPATIENT)
Age: 80
End: 2025-02-10

## 2025-02-10 DIAGNOSIS — H01.00A: ICD-10-CM

## 2025-02-10 DIAGNOSIS — H02.132: ICD-10-CM

## 2025-02-10 DIAGNOSIS — H04.123: ICD-10-CM

## 2025-02-10 DIAGNOSIS — Z96.1: ICD-10-CM

## 2025-02-10 DIAGNOSIS — H18.413: ICD-10-CM

## 2025-02-10 DIAGNOSIS — H01.00B: ICD-10-CM

## 2025-02-10 DIAGNOSIS — G43.B0: ICD-10-CM

## 2025-02-10 DIAGNOSIS — H43.813: ICD-10-CM

## 2025-02-10 DIAGNOSIS — H02.135: ICD-10-CM

## 2025-02-10 DIAGNOSIS — H35.373: ICD-10-CM

## 2025-02-10 PROCEDURE — 92015 DETERMINE REFRACTIVE STATE: CPT

## 2025-02-10 PROCEDURE — 99204 OFFICE O/P NEW MOD 45 MIN: CPT

## 2025-02-10 PROCEDURE — 92250 FUNDUS PHOTOGRAPHY W/I&R: CPT

## 2025-08-01 ENCOUNTER — OFFICE VISIT (OUTPATIENT)
Age: 80
End: 2025-08-01
Payer: MEDICARE

## 2025-08-01 VITALS
WEIGHT: 221 LBS | HEIGHT: 77 IN | SYSTOLIC BLOOD PRESSURE: 138 MMHG | DIASTOLIC BLOOD PRESSURE: 88 MMHG | HEART RATE: 67 BPM | BODY MASS INDEX: 26.09 KG/M2

## 2025-08-01 DIAGNOSIS — Z86.79 S/P MAZE OPERATION FOR ATRIAL FIBRILLATION: ICD-10-CM

## 2025-08-01 DIAGNOSIS — I48.11 LONGSTANDING PERSISTENT ATRIAL FIBRILLATION: ICD-10-CM

## 2025-08-01 DIAGNOSIS — Z98.890 S/P MAZE OPERATION FOR ATRIAL FIBRILLATION: ICD-10-CM

## 2025-08-01 DIAGNOSIS — I42.0 DILATED CARDIOMYOPATHY: Primary | ICD-10-CM

## 2025-08-01 DIAGNOSIS — Z95.1 S/P CABG (CORONARY ARTERY BYPASS GRAFT): ICD-10-CM

## 2025-08-01 DIAGNOSIS — I34.0 NON-RHEUMATIC MITRAL REGURGITATION: ICD-10-CM

## 2025-08-01 PROCEDURE — 93000 ELECTROCARDIOGRAM COMPLETE: CPT | Performed by: INTERNAL MEDICINE

## 2025-08-01 PROCEDURE — 99214 OFFICE O/P EST MOD 30 MIN: CPT | Performed by: INTERNAL MEDICINE

## 2025-08-01 RX ORDER — METOPROLOL SUCCINATE 25 MG/1
12.5 TABLET, EXTENDED RELEASE ORAL 2 TIMES DAILY
COMMUNITY
Start: 2025-07-29 | End: 2026-07-29

## 2025-08-01 NOTE — PROGRESS NOTES
Date of Office Visit: 25  Encounter Provider: Jean Pierre Kelsey MD  Place of Service: [facility]  Patient Name: Lucas Maynard  :1945  [id number]    CC: CAD  :     HPI: Lucas Maynard is a 80 y.o. male who had in 2021 a CABG x 5 with SANTOS to LAD, SVG to PDA, SVG to OM1, sequential SVG to Diag 1 and Diag2 with cryo-maze and resection and closure of left atrial appendage and mitral valve repair by Dr Yu.  EF preoperatively was 40 to 45%.      He is here for follow-up and is doing pretty well but his main complaint is that he is low in energy.  He has some neuropathy.  Pain in his legs and he has got some chronic like staggering lightheadedness does not seem to be related to blood pressure.  His energy is worse in the morning when he wakes up and it gets better as the day goes along.  He has not had any low blood pressures he does not know if he snores he does not sleep with anyone and his PCP is recommended a sleep study on him he is also been a little bit anemic which is curious and evidently there is not been a source for that he is taking a baby aspirin a day he has not been on anticoagulation because of the anemia.  He is not having chest pain he is not having PND orthopnea edema syncope.  No real heart failure symptoms  Past Medical History:   Diagnosis Date    Atrial fibrillation     CAD (coronary artery disease)     Cancer 2007    prostate biopsy only no radiation    Cardiomyopathy     Carotid stenosis     DDD (degenerative disc disease), lumbar     Disease of thyroid gland     GERD (gastroesophageal reflux disease)     Hyperlipidemia     Low hemoglobin     Mitral valve stenosis     Murmur, heart     SOB (shortness of breath)        Past Surgical History:   Procedure Laterality Date    CARDIAC CATHETERIZATION N/A 2021    Procedure: Coronary angiography;  Surgeon: Jean Pierre Kelsey MD;  Location: [facility] CATH INVASIVE LOCATION;  Service:  Cardiovascular;  Laterality: N/A;    CARDIAC CATHETERIZATION N/A 08/30/2021    Procedure: Left Heart Cath;  Surgeon: Jean Pierre Kelsey MD;  Location: [facility] CATH INVASIVE LOCATION;  Service: Cardiovascular;  Laterality: N/A;    CARDIAC CATHETERIZATION N/A 08/30/2021    Procedure: Left ventriculography;  Surgeon: Jean Pierre Kelsey MD;  Location: [facility] CATH INVASIVE LOCATION;  Service: Cardiovascular;  Laterality: N/A;    CAROTID ENDARTERECTOMY Right 12/20/2018    Procedure: RIGHT CAROTID ENDARTERECTOMY;  Surgeon: Javier Acevedo MD;  Location: [facility] MAIN OR;  Service: Vascular    CAROTID ENDARTERECTOMY Left 07/03/2019    Procedure: left CAROTID ENDARTERECTOMY;  Surgeon: Javier Acevedo MD;  Location: [facility] MAIN OR;  Service: Vascular    CATARACT EXTRACTION, BILATERAL  2020    WITH LENS     COLONOSCOPY N/A 07/27/2016    Procedure: COLONOSCOPY to terminal ileum with polypectomy;  Surgeon: Hi Quintanilla MD;  Location: [facility] ENDOSCOPY;  Service:     COLONOSCOPY N/A 11/14/2023    Procedure: COLONOSCOPY TO CECUM WITH POLYPECTOMY ( HOT SNARE/COLD BX);  Surgeon: Hi Quintanilla MD;  Location: [facility] ENDOSCOPY;  Service: Gastroenterology;  Laterality: N/A;  IRON DEFICIENCY ANEMIA  POST: DIVERTICULOSIS; SUBOPTIMAL PREP; COLON POLYPS; HEMORRHOIDS    CORONARY ARTERY BYPASS GRAFT WITH MITRAL VALVE REPAIR/REPLACEMENT N/A 09/14/2021    Procedure: INTRAOP GIRMA, CORONARY ARTERY BYPASS GRAFTING X 5 UTILIZING LEFT SANTOS AND ENDOSCOPICALLY HARVESTED RIGHT SAPHENOUS VEIN, MITRAL VALVE REPAIR/REPLACEMENT, MAZE PROCEDURE,  ATRIAL APPENDAGE CLOSURE, AND PRP;  Surgeon: Jr Flaco Yu MD;  Location: [facility] CVOR;  Service: Cardiothoracic;  Laterality: N/A;    CYST REMOVAL Right     ANKLE    ENDOSCOPY      ENDOSCOPY N/A 11/14/2023    Procedure: ESOPHAGOGASTRODUODENOSCOPY WITH  BIOPSY AND APC;  Surgeon: Hi Quintanilla MD;  Location: [facility] ENDOSCOPY;  Service: Gastroenterology;  Laterality: N/A;  IRON DEFICIENCY ANEMIA;  GERD  POST: MILD ESOPHAGITIS; HIATAL HERNIA;GAVE    LAPAROSCOPIC CHOLECYSTECTOMY      PROSTATE BIOPSY      dx 2007 cancer no surgery or radiation, MULTIPLE BIOPSIES    TONSILLECTOMY         Social History     Socioeconomic History    Marital status: Single   Tobacco Use    Smoking status: Never     Passive exposure: Never    Smokeless tobacco: Never   Vaping Use    Vaping status: Never Used   Substance and Sexual Activity    Alcohol use: Not Currently     Comment: RARE    Drug use: No    Sexual activity: Defer       Family History   Problem Relation Age of Onset    Malig Hyperthermia Neg Hx        Review of Systems   Constitutional: Negative for decreased appetite, fever, malaise/fatigue and weight loss.   HENT:  Negative for nosebleeds.    Eyes:  Negative for double vision.   Cardiovascular:  Negative for chest pain, claudication, cyanosis, dyspnea on exertion, irregular heartbeat, leg swelling, near-syncope, orthopnea, palpitations, paroxysmal nocturnal dyspnea and syncope.   Respiratory:  Negative for cough, hemoptysis and shortness of breath.    Hematologic/Lymphatic: Negative for bleeding problem.   Skin:  Negative for rash.   Musculoskeletal:  Negative for falls and myalgias.   Gastrointestinal:  Negative for hematochezia, jaundice, melena, nausea and vomiting.   Genitourinary:  Negative for hematuria.   Neurological:  Negative for dizziness and seizures.   Psychiatric/Behavioral:  Negative for altered mental status and memory loss.        Allergies   Allergen Reactions    Avelox [Moxifloxacin Hcl] Other (See Comments) and Myalgia     Joint discomfort    Eliquis [Apixaban] Rash    Linzess [Linaclotide] Diarrhea    Statins Myalgia    Xarelto [Rivaroxaban] Myalgia         Current Outpatient Medications:     acetaminophen (TYLENOL) 325 MG tablet, Take 2 tablets by mouth Every 4 (Four) Hours As Needed for Mild Pain ., Disp: , Rfl:     aspirin 81 MG EC tablet, Take 1 tablet by mouth Daily. INSTRUCTED PT TO FOLLOW  "MD INSTRUCTIONS REGARDING SURGERY, Disp: , Rfl:     Evolocumab (Repatha SureClick) solution auto-injector SureClick injection, Repatha SureClick 140 mg/mL subcutaneous pen injector  INJECT 1 ML UNDER THE SKIN EVERY 2 WEEKS FOR 90 DAYS, Disp: , Rfl:     furosemide (LASIX) 20 MG tablet, Take 1 tablet by mouth Daily As Needed., Disp: , Rfl:     ipratropium (ATROVENT) 0.03 % nasal spray, Administer 2 sprays into the nostril(s) as directed by provider As Needed., Disp: , Rfl:     levothyroxine (SYNTHROID, LEVOTHROID) 88 MCG tablet, Take 1 tablet by mouth Daily., Disp: , Rfl:     losartan (COZAAR) 25 MG tablet, TAKE 1 TABLET BY MOUTH DAILY (Patient taking differently: Take 1 tablet by mouth 2 (Two) Times a Day.), Disp: 90 tablet, Rfl: 3    LYCOPENE PO, Take 80 mg by mouth Daily. HOLD PRIOR TO SURG, Disp: , Rfl:     metoprolol succinate XL (TOPROL-XL) 25 MG 24 hr tablet, Take 0.5 tablets by mouth 2 (Two) Times a Day., Disp: , Rfl:     Multiple Vitamin (MULTI-VITAMIN DAILY PO), Take 1 tablet by mouth Daily. HOLD PRIOR TO SURG, Disp: , Rfl:     pantoprazole (PROTONIX) 40 MG EC tablet, Take 1 tablet by mouth As Needed., Disp: , Rfl:     Probiotic Product (PROBIOTIC ADVANCED PO), Take 2 capsules by mouth Daily., Disp: , Rfl:     vitamin B-12 (CYANOCOBALAMIN) 1000 MCG tablet, Take 2 tablets by mouth Daily. HOLD PRIOR TO SURG, Disp: , Rfl:       Objective:     Vitals:    08/01/25 1137   BP: 138/88   Pulse: 67   Weight: 100 kg (221 lb)   Height: 195.6 cm (77\")     Body mass index is 26.21 kg/m².    Constitutional:       Appearance: Well-developed.   Eyes:      General: No scleral icterus.  HENT:      Head: Normocephalic.   Neck:      Thyroid: No thyromegaly.      Vascular: No JVD.      Lymphadenopathy: No cervical adenopathy.   Pulmonary:      Effort: Pulmonary effort is normal.      Breath sounds: Normal breath sounds. No wheezing. No rales.   Cardiovascular:      Normal rate. Regular rhythm.      No gallop.    Edema:     " Peripheral edema present.     Feet: 1+ edema of the left foot and 2+ edema of the right foot.  Abdominal:      Palpations: Abdomen is soft.      Tenderness: There is no abdominal tenderness.   Musculoskeletal: Normal range of motion. Skin:     General: Skin is warm and dry.      Findings: No rash.   Neurological:      Mental Status: Alert and oriented to person, place, and time.           ECG 12 Lead    Date/Time: 8/1/2025 12:33 PM  Performed by: Jean Pierre Kelsey MD    Authorized by: Jean Pierre Kelsey MD  Rhythm: atrial fibrillation           Assessment:       Diagnosis Plan   1. Dilated cardiomyopathy        2. S/P CABG (coronary artery bypass graft)        3. S/P Maze operation for atrial fibrillation        4. Non-rheumatic mitral regurgitation        5. Longstanding persistent atrial fibrillation               Plan:       Not really sure why he is fatigued I definitely think he should have a sleep study I also think we should be thinking about another echo on him just to make sure there is not been a change after his bypass his LV function returned to normal.  I am not getting any symptoms to indicate ischemia some not going down that road.  I will have him come back and see us in a year sooner if he has trouble his PCP is really doing a great job with him    As always, it has been a pleasure to participate in your patient's care.      Sincerely,       Jean Pierre Kelsey MD

## 2025-08-13 ENCOUNTER — HOSPITAL ENCOUNTER (OUTPATIENT)
Dept: CARDIOLOGY | Facility: HOSPITAL | Age: 80
Discharge: HOME | End: 2025-08-13
Admitting: INTERNAL MEDICINE
Payer: MEDICARE

## 2025-08-13 VITALS
HEIGHT: 77 IN | SYSTOLIC BLOOD PRESSURE: 144 MMHG | WEIGHT: 221 LBS | OXYGEN SATURATION: 98 % | BODY MASS INDEX: 26.09 KG/M2 | HEART RATE: 65 BPM | DIASTOLIC BLOOD PRESSURE: 80 MMHG

## 2025-08-13 DIAGNOSIS — Z86.79 S/P MAZE OPERATION FOR ATRIAL FIBRILLATION: ICD-10-CM

## 2025-08-13 DIAGNOSIS — Z98.890 S/P MAZE OPERATION FOR ATRIAL FIBRILLATION: ICD-10-CM

## 2025-08-13 DIAGNOSIS — I34.0 NON-RHEUMATIC MITRAL REGURGITATION: ICD-10-CM

## 2025-08-13 DIAGNOSIS — Z95.1 S/P CABG (CORONARY ARTERY BYPASS GRAFT): ICD-10-CM

## 2025-08-13 DIAGNOSIS — I42.0 DILATED CARDIOMYOPATHY: ICD-10-CM

## 2025-08-13 DIAGNOSIS — I48.11 LONGSTANDING PERSISTENT ATRIAL FIBRILLATION: ICD-10-CM

## 2025-08-13 LAB
AORTIC ARCH: 3.1 CM
AORTIC DIMENSIONLESS INDEX: 0.51 (DI)
ASCENDING AORTA: 3.3 CM
AV MEAN PRESS GRAD SYS DOP V1V2: 5 MMHG
AV VMAX SYS DOP: 153 CM/SEC
BH CV ECHO LEFT ATRIAL STRAIN: 5.8 %
BH CV ECHO MEAS - ACS: 2.3 CM
BH CV ECHO MEAS - AI P1/2T: 547.1 MSEC
BH CV ECHO MEAS - AO MAX PG: 9.4 MMHG
BH CV ECHO MEAS - AO ROOT AREA (BSA CORRECTED): 1.2 CM2
BH CV ECHO MEAS - AO ROOT DIAM: 2.7 CM
BH CV ECHO MEAS - AO V2 VTI: 38.5 CM
BH CV ECHO MEAS - AVA(I,D): 2.34 CM2
BH CV ECHO MEAS - EDV(CUBED): 148.9 ML
BH CV ECHO MEAS - EDV(MOD-SP2): 153 ML
BH CV ECHO MEAS - EDV(MOD-SP4): 142 ML
BH CV ECHO MEAS - EF(MOD-SP2): 58.2 %
BH CV ECHO MEAS - EF(MOD-SP4): 61.3 %
BH CV ECHO MEAS - ESV(CUBED): 31.5 ML
BH CV ECHO MEAS - ESV(MOD-SP2): 64 ML
BH CV ECHO MEAS - ESV(MOD-SP4): 55 ML
BH CV ECHO MEAS - FS: 40.4 %
BH CV ECHO MEAS - IVS/LVPW: 0.92 CM
BH CV ECHO MEAS - IVSD: 1.2 CM
BH CV ECHO MEAS - LAT PEAK E' VEL: 5.9 CM/SEC
BH CV ECHO MEAS - LV DIASTOLIC VOL/BSA (35-75): 60.8 CM2
BH CV ECHO MEAS - LV MASS(C)D: 271.6 GRAMS
BH CV ECHO MEAS - LV MAX PG: 2.7 MMHG
BH CV ECHO MEAS - LV MEAN PG: 1 MMHG
BH CV ECHO MEAS - LV SYSTOLIC VOL/BSA (12-30): 23.6 CM2
BH CV ECHO MEAS - LV V1 MAX: 81.8 CM/SEC
BH CV ECHO MEAS - LV V1 VTI: 19.7 CM
BH CV ECHO MEAS - LVIDD: 5.3 CM
BH CV ECHO MEAS - LVIDS: 3.2 CM
BH CV ECHO MEAS - LVOT AREA: 4.6 CM2
BH CV ECHO MEAS - LVOT DIAM: 2.42 CM
BH CV ECHO MEAS - LVPWD: 1.3 CM
BH CV ECHO MEAS - MED PEAK E' VEL: 5.2 CM/SEC
BH CV ECHO MEAS - MV DEC SLOPE: 827.7 CM/SEC2
BH CV ECHO MEAS - MV DEC TIME: 0.17 SEC
BH CV ECHO MEAS - MV E MAX VEL: 145.9 CM/SEC
BH CV ECHO MEAS - MV MAX PG: 12.4 MMHG
BH CV ECHO MEAS - MV MEAN PG: 5.6 MMHG
BH CV ECHO MEAS - MV P1/2T: 59.9 MSEC
BH CV ECHO MEAS - MV V2 VTI: 31.1 CM
BH CV ECHO MEAS - MVA(P1/2T): 3.7 CM2
BH CV ECHO MEAS - MVA(VTI): 2.9 CM2
BH CV ECHO MEAS - PA ACC TIME: 0.1 SEC
BH CV ECHO MEAS - PA V2 MAX: 66.4 CM/SEC
BH CV ECHO MEAS - PAPD(PI EDV): 5.1 MMHG
BH CV ECHO MEAS - PI END-D VEL: 113.1 CM/SEC
BH CV ECHO MEAS - PULM DIAS VEL: 57.9 CM/SEC
BH CV ECHO MEAS - PULM S/D: 0.36
BH CV ECHO MEAS - PULM SYS VEL: 20.8 CM/SEC
BH CV ECHO MEAS - PV RVPEPC: 22.5 SEC
BH CV ECHO MEAS - QP/QS: 0.43
BH CV ECHO MEAS - RAP SYSTOLE: 8 MMHG
BH CV ECHO MEAS - RV MAX PG: 1.18 MMHG
BH CV ECHO MEAS - RV V1 MAX: 54.4 CM/SEC
BH CV ECHO MEAS - RV V1 VTI: 12.4 CM
BH CV ECHO MEAS - RVOT DIAM: 2 CM
BH CV ECHO MEAS - RVSP: 59 MMHG
BH CV ECHO MEAS - SUP REN AO DIAM: 2 CM
BH CV ECHO MEAS - SV(LVOT): 90.2 ML
BH CV ECHO MEAS - SV(MOD-SP2): 89 ML
BH CV ECHO MEAS - SV(MOD-SP4): 87 ML
BH CV ECHO MEAS - SV(RVOT): 39.2 ML
BH CV ECHO MEAS - SVI(LVOT): 38.7 ML/M2
BH CV ECHO MEAS - SVI(MOD-SP2): 38.1 ML/M2
BH CV ECHO MEAS - SVI(MOD-SP4): 37.3 ML/M2
BH CV ECHO MEAS - TAPSE (>1.6): 2.14 CM
BH CV ECHO MEAS - TR MAX PG: 51.2 MMHG
BH CV ECHO MEAS - TR MAX VEL: 357.8 CM/SEC
BH CV ECHO MEAS RV FREE WALL STRAIN: -14.9 %
BH CV ECHO MEASUREMENTS AVERAGE E/E' RATIO: 26.29
BH CV XLRA - RV BASE: 4.3 CM
BH CV XLRA - RV LENGTH: 9.5 CM
BH CV XLRA - RV MID: 4 CM
BH CV XLRA - TDI S': 9.7 CM/SEC
LEFT ATRIUM VOLUME INDEX: 38.6 ML/M2
LV EF BIPLANE MOD: 60 %
SINUS: 3.2 CM
STJ: 2.6 CM

## 2025-08-13 PROCEDURE — 93306 TTE W/DOPPLER COMPLETE: CPT

## 2025-08-14 DIAGNOSIS — G47.33 OBSTRUCTIVE SLEEP APNEA SYNDROME: Primary | ICD-10-CM

## (undated) DEVICE — ST. SORBAVIEW ULTIMATE IJ SYSTEM A,C: Brand: CENTURION

## (undated) DEVICE — CANN ART SOFTFLOW EXT W/SUT/RNG 7MM

## (undated) DEVICE — 12 FOOT DISPOSABLE EXTENSION CABLE WITH SAFE CONNECT / SCREW-DOWN

## (undated) DEVICE — Device

## (undated) DEVICE — NEEDLE, QUINCKE, 20GX3.5": Brand: MEDLINE

## (undated) DEVICE — OASIS DRAIN, DUAL, IN-LINE, ATS COMPATIBLE: Brand: OASIS

## (undated) DEVICE — GLIDESHEATH BASIC HYDROPHILIC COATED INTRODUCER SHEATH: Brand: GLIDESHEATH

## (undated) DEVICE — SUNDT™ EXTERNAL CAROTID ENDARTERECTOMY SHUNT: Brand: SUNDT™

## (undated) DEVICE — CATH DIAG IMPULSE FL3.5 5F 100CM

## (undated) DEVICE — TUBING EXTENSION SET: Brand: ARGYLE

## (undated) DEVICE — TP UMB COTN 1/8X36 U12T

## (undated) DEVICE — DRSNG SURESITE WNDW 2.38X2.75

## (undated) DEVICE — ANTIBACTERIAL UNDYED BRAIDED (POLYGLACTIN 910), SYNTHETIC ABSORBABLE SUTURE: Brand: COATED VICRYL

## (undated) DEVICE — ERBE NESSY®PLATE 170 SPLIT; 168CM²; CABLE 3M: Brand: ERBE

## (undated) DEVICE — SUT SILK 2/0 TIES 18IN A185H

## (undated) DEVICE — GLV SURG SIGNATURE ESSENTIAL PF LTX SZ7.5

## (undated) DEVICE — CVR PROB 96IN LF STRL

## (undated) DEVICE — GW EMR FIX EXCHG J STD .035 3MM 260CM

## (undated) DEVICE — MEDICINE CUP, GRADUATED, STER: Brand: MEDLINE

## (undated) DEVICE — CATHETER,URETHRAL,REDRUBBER,STERILE,20FR: Brand: MEDLINE

## (undated) DEVICE — SUT VIC 4/0 SH 27IN J415H

## (undated) DEVICE — LN SMPL CO2 SHTRM SD STREAM W/M LUER

## (undated) DEVICE — STERILE COTTON TIP 6IN 10PK: Brand: MEDLINE

## (undated) DEVICE — SUT SILK 4/0 TIES 18IN A183H

## (undated) DEVICE — PK ATS CUST W CARDIOTOMY RESEVOIR

## (undated) DEVICE — APC PROBE 2200 A, SINGLE USE OD 2.3MM/6.9FR; L. 2.2M/7.2FT: Brand: ERBE

## (undated) DEVICE — LP VESL MAXI 2.5X1MM RED 2PK

## (undated) DEVICE — TBG INSUFFLATION LUER LOCK: Brand: MEDLINE INDUSTRIES, INC.

## (undated) DEVICE — SENSR CERBRL O2 PK/2

## (undated) DEVICE — SYR MEDALLION SALINE PLUNGR/WHITE 10ML

## (undated) DEVICE — HEMOCONCENTRATOR PERFUS LPS06

## (undated) DEVICE — PK CATH CARD 40

## (undated) DEVICE — SOL IRR H2O BTL 1000ML STRL

## (undated) DEVICE — GLV SURG BIOGEL LTX PF 7 1/2

## (undated) DEVICE — IRRIGATOR BULB ASEPTO 60CC STRL

## (undated) DEVICE — CATH DIAG IMPULSE FL4 5F 100CM

## (undated) DEVICE — 28 FR RIGHT ANGLE – SOFT PVC CATHETER: Brand: PVC THORACIC CATHETERS

## (undated) DEVICE — CATH DIAG IMPULSE FR4 5F 100CM

## (undated) DEVICE — THE SINGLE USE ETRAP – POLYP TRAP IS USED FOR SUCTION RETRIEVAL OF ENDOSCOPICALLY REMOVED POLYPS.: Brand: ETRAP

## (undated) DEVICE — FRCP BX RADJAW4 NDL 2.8 240CM LG OG BX40

## (undated) DEVICE — SUT PROLN 4/0 BB D/A 36IN 8581H

## (undated) DEVICE — SYS PERFUS SEP PLATLT W TIPS CUST

## (undated) DEVICE — ADHS SKIN DERMABOND TOP ADVANCED

## (undated) DEVICE — ADAPT CLN BIOGUARD AIR/H2O DISP

## (undated) DEVICE — CANN VENI DLP SGL/STAGE DLP RT/ANGL EXT TP 24F

## (undated) DEVICE — SOL NS 500ML

## (undated) DEVICE — PK ENDART CARTOID 40

## (undated) DEVICE — SUT SILK 3/0 SH CR5 18IN C0135

## (undated) DEVICE — SUT SILK 3/0 TIES 18IN A184H

## (undated) DEVICE — BG TRANSF W/COUPLER SPK 600ML

## (undated) DEVICE — PK SUT OPN HEART POLLOCK CUST

## (undated) DEVICE — SYS VASOVIEW HEMOPRO ENDOSCOPIC HARVST VESL

## (undated) DEVICE — CATH4F INF PIG 145Â° MOD 110CM: Brand: INFINITI

## (undated) DEVICE — GLV SURG BIOGEL M LTX PF 7 1/2

## (undated) DEVICE — PROB CRYO ABL ICE MALL LSS BEND 10CM

## (undated) DEVICE — LOU OPEN HEART DR POLLOCK: Brand: MEDLINE INDUSTRIES, INC.

## (undated) DEVICE — SOL ISO/ALC RUB 70PCT 4OZ

## (undated) DEVICE — 28 FR STRAIGHT – SOFT PVC CATHETER: Brand: PVC THORACIC CATHETERS

## (undated) DEVICE — SUT PROLN 6/0 BV1 D/A 30IN 8709H

## (undated) DEVICE — SOL IRR NACL 0.9PCT BT 1000ML

## (undated) DEVICE — BLCK/BITE BLOX W/DENTL/RIM W/STRAP 54F

## (undated) DEVICE — SENSR O2 OXIMAX FNGR A/ 18IN NONSTR

## (undated) DEVICE — KT ORCA ORCAPOD DISP STRL

## (undated) DEVICE — CANN VENI DLP SGL/STAGE RT/ANGL MTL/TP 28F

## (undated) DEVICE — ROTATING SURGICAL PUNCHES, 1 PER POUCH: Brand: A&E MEDICAL / ROTATING SURGICAL PUNCHES

## (undated) DEVICE — TUBING, SUCTION, 1/4" X 10', STRAIGHT: Brand: MEDLINE

## (undated) DEVICE — CORONARY ARTERY BYPASS GRAFT MARKERS, STAINLESS STEEL, DISTAL, WITHOUT HOLDER: Brand: ANASTOMARK CORONARY ARTERY BYPASS GRAFT MARKERS, STAINLESS STEEL, DISTAL

## (undated) DEVICE — HARMONIC SYNERGY DISSECTING HOOK WITH TORQUE WRENCH. FOR USE WITH BLUE HAND PIECE ONLY: Brand: HARMONIC SYNERGY

## (undated) DEVICE — PK PERFUS CUST W/CARDIOPLEGIA

## (undated) DEVICE — PK HEART OPN 40

## (undated) DEVICE — ST TOURNI COMPL A/ 7IN

## (undated) DEVICE — SPNG GZ WOVN 4X4IN 12PLY 10/BX STRL

## (undated) DEVICE — DRSNG WND GEL FIBR OPTICELL AG PLS W/SLV LF 4X5IN  STRL

## (undated) DEVICE — BLOWER/MISTER AXIOUS OPCAB W/TBG

## (undated) DEVICE — CANN O2 ETCO2 FITS ALL CONN CO2 SMPL A/ 7IN DISP LF

## (undated) DEVICE — ADHS SKIN SURG TISS VISC PREMIERPRO EXOFIN HI/VISC FAST/DRY

## (undated) DEVICE — TOWEL,OR,DSP,ST,WHITE,DLX,4/PK,20PK/CS: Brand: MEDLINE

## (undated) DEVICE — DRP SLUSH WARMR MACH CIR 44X44IN

## (undated) DEVICE — MSK PROC CURAPLEX O2 2/ADAPT 7FT

## (undated) DEVICE — GLV SURG BIOGEL SENSR LTX PF SZ7.5

## (undated) DEVICE — SNAR POLYP SENSATION STDOVL 27 240 BX40

## (undated) DEVICE — KT MANIFLD CARDIAC

## (undated) DEVICE — CLAMP INSERT: Brand: STEALTH® CLAMP INSERT